# Patient Record
Sex: FEMALE | Race: BLACK OR AFRICAN AMERICAN | Employment: OTHER | URBAN - METROPOLITAN AREA
[De-identification: names, ages, dates, MRNs, and addresses within clinical notes are randomized per-mention and may not be internally consistent; named-entity substitution may affect disease eponyms.]

---

## 2019-03-21 ENCOUNTER — OFFICE VISIT (OUTPATIENT)
Dept: INTERNAL MEDICINE CLINIC | Age: 44
End: 2019-03-21

## 2019-03-21 VITALS
OXYGEN SATURATION: 99 % | BODY MASS INDEX: 27.05 KG/M2 | TEMPERATURE: 97.9 F | WEIGHT: 147 LBS | RESPIRATION RATE: 18 BRPM | HEIGHT: 62 IN | DIASTOLIC BLOOD PRESSURE: 108 MMHG | HEART RATE: 74 BPM | SYSTOLIC BLOOD PRESSURE: 154 MMHG

## 2019-03-21 DIAGNOSIS — E83.52 HIGH CALCIUM LEVELS: ICD-10-CM

## 2019-03-21 DIAGNOSIS — Z13.220 SCREENING FOR CHOLESTEROL LEVEL: ICD-10-CM

## 2019-03-21 DIAGNOSIS — Z90.81 HX OF SPLENECTOMY: Primary | ICD-10-CM

## 2019-03-21 DIAGNOSIS — M54.5 CHRONIC LOW BACK PAIN, UNSPECIFIED BACK PAIN LATERALITY, WITH SCIATICA PRESENCE UNSPECIFIED: ICD-10-CM

## 2019-03-21 DIAGNOSIS — M25.551 RIGHT HIP PAIN: ICD-10-CM

## 2019-03-21 DIAGNOSIS — M25.551 PAIN OF RIGHT HIP JOINT: ICD-10-CM

## 2019-03-21 DIAGNOSIS — I10 ESSENTIAL HYPERTENSION: ICD-10-CM

## 2019-03-21 DIAGNOSIS — Z11.4 SCREENING FOR HIV (HUMAN IMMUNODEFICIENCY VIRUS): ICD-10-CM

## 2019-03-21 DIAGNOSIS — Z13.1 SCREENING FOR DIABETES MELLITUS: ICD-10-CM

## 2019-03-21 DIAGNOSIS — M54.50 CHRONIC RIGHT-SIDED LOW BACK PAIN WITHOUT SCIATICA: ICD-10-CM

## 2019-03-21 DIAGNOSIS — G89.29 CHRONIC LOW BACK PAIN, UNSPECIFIED BACK PAIN LATERALITY, WITH SCIATICA PRESENCE UNSPECIFIED: ICD-10-CM

## 2019-03-21 DIAGNOSIS — G89.29 CHRONIC RIGHT-SIDED LOW BACK PAIN WITHOUT SCIATICA: ICD-10-CM

## 2019-03-21 DIAGNOSIS — M54.5 LOW BACK PAIN, UNSPECIFIED BACK PAIN LATERALITY, UNSPECIFIED CHRONICITY, WITH SCIATICA PRESENCE UNSPECIFIED: Primary | ICD-10-CM

## 2019-03-21 RX ORDER — LISINOPRIL 10 MG/1
10 TABLET ORAL DAILY
Qty: 30 TAB | Refills: 5 | Status: SHIPPED | OUTPATIENT
Start: 2019-03-21 | End: 2019-07-11 | Stop reason: SDUPTHER

## 2019-03-21 RX ORDER — LISINOPRIL 10 MG/1
TABLET ORAL DAILY
COMMUNITY
End: 2019-03-21 | Stop reason: SDUPTHER

## 2019-03-21 NOTE — PATIENT INSTRUCTIONS
Resume lisinopril and return for blood pressure check in two weeks We may need to increase your blood pressure medication Make appointment with Master Castañeda

## 2019-03-21 NOTE — PROGRESS NOTES
Reviewed record in preparation for visit and have obtained necessary documentation. Identified pt with two pt identifiers(name and ). [facility] Due Topic Date Due  
 DTaP/Tdap/Td  (1 - Tdap) 10/01/1997  Pap Test  10/01/1997  Flu Vaccine  2018 Ms. Sathish Colmenares has a reminder for a \"due or due soon\" health maintenance. I have asked that she discuss this further with her primary care provider for follow-up on this health maintenance. Coordination of Care Questionnaire: 
:  
 
1) Have you been to an emergency room, urgent care clinic since your last visit? no  
Hospitalized since your last visit? no          
 
2) Have you seen or consulted any other health care providers outside of [address] [place] [address] since your last visit? no  (Include any pap smears or colon screenings in this section.) 3) In the event something were to happen to you and you were unable to speak on your behalf, do you have an Advance Directive/ Living Will in place stating your wishes? NO Do you have an Advance Directive on file? no 
 
4) Are you interested in receiving information on Advance Directives? NO Patient is accompanied by self I have received verbal consent from Sarah Bonner to discuss any/all medical information while they are present in the room.
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
## 2019-03-22 LAB
BASOPHILS # BLD AUTO: 0 X10E3/UL (ref 0–0.2)
BASOPHILS NFR BLD AUTO: 0 %
BUN SERPL-MCNC: 16 MG/DL (ref 6–24)
BUN/CREAT SERPL: 14 (ref 9–23)
CALCIUM SERPL-MCNC: 10.5 MG/DL (ref 8.7–10.2)
CHLORIDE SERPL-SCNC: 102 MMOL/L (ref 96–106)
CHOLEST SERPL-MCNC: 240 MG/DL (ref 100–199)
CO2 SERPL-SCNC: 25 MMOL/L (ref 20–29)
CREAT SERPL-MCNC: 1.15 MG/DL (ref 0.57–1)
EOSINOPHIL # BLD AUTO: 0.1 X10E3/UL (ref 0–0.4)
EOSINOPHIL NFR BLD AUTO: 2 %
ERYTHROCYTE [DISTWIDTH] IN BLOOD BY AUTOMATED COUNT: 15.7 % (ref 12.3–15.4)
GLUCOSE SERPL-MCNC: 89 MG/DL (ref 65–99)
HBA1C MFR BLD: 5.9 % (ref 4.8–5.6)
HCT VFR BLD AUTO: 41.7 % (ref 34–46.6)
HDLC SERPL-MCNC: 72 MG/DL
HGB BLD-MCNC: 13.2 G/DL (ref 11.1–15.9)
HIV 1+2 AB+HIV1 P24 AG SERPL QL IA: NON REACTIVE
IMM GRANULOCYTES # BLD AUTO: 0.1 X10E3/UL (ref 0–0.1)
IMM GRANULOCYTES NFR BLD AUTO: 1 %
LDLC SERPL CALC-MCNC: 139 MG/DL (ref 0–99)
LYMPHOCYTES # BLD AUTO: 2.6 X10E3/UL (ref 0.7–3.1)
LYMPHOCYTES NFR BLD AUTO: 28 %
MCH RBC QN AUTO: 26.4 PG (ref 26.6–33)
MCHC RBC AUTO-ENTMCNC: 31.7 G/DL (ref 31.5–35.7)
MCV RBC AUTO: 83 FL (ref 79–97)
MONOCYTES # BLD AUTO: 0.7 X10E3/UL (ref 0.1–0.9)
MONOCYTES NFR BLD AUTO: 8 %
NEUTROPHILS # BLD AUTO: 5.6 X10E3/UL (ref 1.4–7)
NEUTROPHILS NFR BLD AUTO: 61 %
PLATELET # BLD AUTO: 406 X10E3/UL (ref 150–379)
POTASSIUM SERPL-SCNC: 4.7 MMOL/L (ref 3.5–5.2)
RBC # BLD AUTO: 5 X10E6/UL (ref 3.77–5.28)
SODIUM SERPL-SCNC: 142 MMOL/L (ref 134–144)
TRIGL SERPL-MCNC: 147 MG/DL (ref 0–149)
VLDLC SERPL CALC-MCNC: 29 MG/DL (ref 5–40)
WBC # BLD AUTO: 9.1 X10E3/UL (ref 3.4–10.8)

## 2019-03-24 ENCOUNTER — HOSPITAL ENCOUNTER (OUTPATIENT)
Dept: ULTRASOUND IMAGING | Age: 44
Discharge: HOME | End: 2019-03-24
Attending: INTERNAL MEDICINE
Payer: COMMERCIAL

## 2019-03-24 DIAGNOSIS — Z90.81 HX OF SPLENECTOMY: ICD-10-CM

## 2019-03-24 PROCEDURE — 76700 US EXAM ABDOM COMPLETE: CPT

## 2019-03-26 ENCOUNTER — TELEPHONE (OUTPATIENT)
Dept: INTERNAL MEDICINE CLINIC | Age: 44
End: 2019-03-26

## 2019-03-26 DIAGNOSIS — M54.50 CHRONIC BILATERAL LOW BACK PAIN WITHOUT SCIATICA: Primary | ICD-10-CM

## 2019-03-26 DIAGNOSIS — G89.29 CHRONIC BILATERAL LOW BACK PAIN WITHOUT SCIATICA: Primary | ICD-10-CM

## 2019-03-26 NOTE — TELEPHONE ENCOUNTER
Dr. Irma Davis is recommending Ms. Campbell to see Dr. Demetrio Evans / [facility]. Please fax referral to Ortho and Patient would like to receive a copy also, Please call patient when ready. Please leave a voicemail if patient doesn't answer.

## 2019-03-27 NOTE — PROGRESS NOTES
Calcium level is slightly elevated recommend that we repeat calcium. Patient should not take any calcium supplement. Cholesterol is mildly elevated - work on low fat diet, high in fiber ( such as oatmeal) Pre Diabetes: Recommend following a low sugar diet and not drinking soda /sweetened drinks

## 2019-03-27 NOTE — PROGRESS NOTES
You have a partial spleen so no need to do extra vaccinations  On imaging there is a renal cyst these are typically benign  The appearance of your kidneys show kidney disease which is most likely due to high blood pressure. Your kidney function looked ok on labs. It is important that you avoid NSAIDs ( ibuprofen, motrin)  It is important that you take your blood pressure medication  And we will discuss this further on the appointment in May.

## 2019-03-28 NOTE — PROGRESS NOTES
Spoke to pt. Reported Calcium level is slightly elevated recommend that we repeat calcium. Patient should not take any calcium supplement. Cholesterol is mildly elevated - work on low fat diet, high in fiber ( such as oatmeal) Pre Diabetes: Recommend following a low sugar diet and not drinking soda /sweetened drinks Calcium lab order in system, pt told to come in for lab work sometime next week, no appointment needed. No further questions at time of call.

## 2019-05-02 ENCOUNTER — HOSPITAL ENCOUNTER (OUTPATIENT)
Dept: PHYSICAL THERAPY | Age: 44
Discharge: HOME | End: 2019-05-02
Payer: COMMERCIAL

## 2019-05-02 PROCEDURE — 97110 THERAPEUTIC EXERCISES: CPT

## 2019-05-02 PROCEDURE — 97162 PT EVAL MOD COMPLEX 30 MIN: CPT

## 2019-05-02 NOTE — PROGRESS NOTES
PT INITIAL EVALUATION NOTE 2-15 Patient Name: Scot Ring Date:2019 : 1975 [x]  Patient  Verified Payor: [address] [place] [address] Road / Plan: [address] / Product Type: Managed Care Medicaid / In time: 8:09 AM  Out time: 9:06 AM 
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 She was born R sided hemihypertrophy and has had numerous surgeries on the R side. She does not use a heel lift; she may have done this as a child, but not in her adult years. She reports that since moving back from [place], the intensity and frequency have both decreased and it is not constant. She reports that her back pain is on the R side and feels muscular; she complains of a knot in the lower back. She reports that she has been experiencing L sided nerve pain/discomfort for the last 1-2 years. She will experience tingling in the posterior thigh that stops proximal to the knee when she is lying on her back with her trunk elevated (not all the time). She also experiences \"sensations\" on the L side that are variable and unpredictable. She denies any numbness in her LEs. She complains of weakness and increased fatigue in the R LE over the last few years; she denies any bucking in the R LE. She reports decreased R knee flexion. Aggravating factors include: walking > 2 blocks, standing > 5 minutes. Relieving factors include: rest, bending forward. She is independent with all ADLs, but has more pain and discomfort. She works as an editorial manicurist and a loan - she will go to [place] 2x a month and it will be difficult because she has to carry her supplies and walk more. She denies any problems sitting for long period of time. She lives in a 1-story home with 3 stairs to enter; she is independent with stair navigation. She is sleeping okay at night; she sleeps on her stomach. PMH:  3 surgeries in R hip, 2 surgeries in R knee, 5-6 surgeries on R foot/ankle, HTN (controlled), hx of bell's palsy. OBJECTIVE/EXAMINATION Posture: In standing, L hip higher, R hip dip to making up for R leg discrepancy. Other Observations:  N/A Gait and Functional Mobility:  Antalgic and ataxic, R uncompensated Trendelenburg gait Lumbar ROM: 
 Flexion: Mild Extension: Mild Side Bending: Right: NT   Left: NT 
 Rotation: Right: WFL    Left: Mild Balance Assessment: Moderate deficits in balance and neuromuscular control in single limb stance on R Squat Assessment: 
 Double Leg Unable to squat past 90 degrees knee flexion because of R knee ROM; forward trunk lean, genu valgus Single Leg NT Neurological: Sensations: Intact to light touch sensation L1-S2 bilaterally Flexibility: Moderate restrictions in hamstrings, hip internal and external rotators, and gastrocnemius/soleus complex bilaterally; Severe R hip flexor restrictions Right Knee:  AROM 0-90 Left  Knee:  AROM [facility] LOWER QUARTER   MUSCLE STRENGTH 
KEY       R  L 
0 - No Contraction  Hip flex  4  4+ 1 - Trace          er    4-  4 
2 - Poor          ir   4  4 
3 - Fair           abd  4-  4 
4 - Good          ext  4  4+ 5 - Normal   Knee flex  4  4+ Ext  4  4+ Ankle DF  NT  5 Gluteal activation: The Pt has improper gluteal activation with hip extension bilaterally Joint Mobility Assessment: Mild hypomobility of thoracic and lumbar spine Palpation: TTP along R lumbar paraspinals and lower thoracic paraspinals with increased turgor noted Special Tests:Varus: NT     SLR: (-) B Valgus: NT     Slump: NT 
  Zachery's: NT    Keaton: NT Anterior Drawer: NT    Obers: NT 
  Posterior Drawer: NT    Clonus: NT 
  Lachman's: NT 
 
 
13 min Therapeutic Exercise:  [x] See flow sheet :  
Rationale: increase ROM, increase strength, improve coordination, improve balance and increase proprioception to improve the patients ability to ambulate and perform ADLs with less pain or discomfort. With 
 [x] TE 
 [] TA 
 [] neuro [x] other: Patient Education: [x] Review HEP [] Progressed/Changed HEP based on:  
[] positioning   [] body mechanics   [] transfers   [] heat/ice application   
[x] other: Pt educated on diagnosis and prognosis with therapy Other Objective/Functional Measures: FOTO 62/100 Pain Level (0-10 scale) post treatment: 5/10 ASSESSMENT:  
  
[x]  See Plan of Care Racquel Daniel, PT 5/2/2019

## 2019-05-02 NOTE — PROGRESS NOTES
[facility] [address] [place] Phone: [phone] Fax: [phone] Plan of Care/Statement of Necessity for Physical Therapy Services  2-15 Patient name: Carolynn Benitez  : 1975  Provider#: [id number] Referral source: Jacob Del Valle MD     
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 
 
Start of Care: 20      Onset Date: Chronic The Plan of Care and following information is based on the information from the initial evaluation. Assessment/ key information: The Pt is a pleasant and motivated 44year old female who presents to therapy with R-sided lower back pain and occasional L-sided radiculopathy. The Pt has been diagnosis with hemihypertrophy with R sided LE shortening and a R club foot. She has had multiple surgeries on her R hip, knee, and ankle/foot since childhood. The Pt complains of significant, chronic R-sided LBP. Based on examination, the Pt presents with decreased hip strength and stability, decreased core strength and stability, restrictions in her hip musculature flexibility, decreased balance and neuromuscular control, gait impairments, and decreased activity tolerance and endurance. The patient would benefit from skilled physical therapy to help improve the above listed impairments to allow the patient to safely return to their prior level of function with less overall pain or risk of further injury. The patient has a fair prognosis with skilled physical therapy. Evaluation Complexity History HIGH Complexity :3+ comorbidities / personal factors will impact the outcome/ POC ; Examination HIGH Complexity : 4+ Standardized tests and measures addressing body structure, function, activity limitation and / or participation in recreation  ;Presentation MEDIUM Complexity : Evolving with changing characteristics  ; Clinical Decision Making MEDIUM Complexity : FOTO score of 26-74 Overall Complexity Rating: MEDIUM Problem List: pain affecting function, decrease ROM, decrease strength, impaired gait/ balance, decrease ADL/ functional abilitiies, decrease activity tolerance, decrease flexibility/ joint mobility and decrease transfer abilities Treatment Plan may include any combination of the following: Therapeutic exercise, Therapeutic activities, Neuromuscular re-education, Physical agent/modality, Gait/balance training, Manual therapy, Patient education, Self Care training, Functional mobility training, Home safety training and Stair training Patient / Family readiness to learn indicated by: asking questions and interest 
Persons(s) to be included in education: patient (P) Barriers to Learning/Limitations: None Patient Goal (s): learn to live with the pain, build right side muscle Patient Self Reported Health Status: fair Rehabilitation Potential: fair Short Term Goals: To be accomplished in 6 treatments: 
1. The Pt will be independent and compliant with their HEP. 2. The Pt will report a 50% reduction in their pain with ADLs. Long Term Goals: To be accomplished in 12 treatments: 
1. The Pt will score the MCII on her FOTO survey demonstrating improved overall function (62 to 67 points). 2. The Pt will be able to stand >/= 10 minutes with 0-4/10 pain to allow the Pt to be able to perform standing ADLs with less pain or discomfort.  
3. The Pt will be able to walk >/= 4 city blocks with 0-4/10 pain to allow the Pt to be able to walk to work in [facility] with less pain or discomfort. Frequency / Duration: Patient to be seen 1-2 times per week for 12 treatments. Patient/ Caregiver education and instruction: self care, activity modification and exercises 
 
[x]  Plan of care has been reviewed with SELWYN No, PT 5/2/2019  
 
________________________________________________________________________ I certify that the above Therapy Services are being furnished while the patient is under my care. I agree with the treatment plan and certify that this therapy is necessary. [de-identified] Signature:____________________  Date:____________Time: _________

## 2019-05-07 ENCOUNTER — HOSPITAL ENCOUNTER (OUTPATIENT)
Dept: PHYSICAL THERAPY | Age: 44
Discharge: HOME | End: 2019-05-07
Payer: COMMERCIAL

## 2019-05-07 PROCEDURE — 97110 THERAPEUTIC EXERCISES: CPT

## 2019-05-07 NOTE — PROGRESS NOTES
PT DAILY TREATMENT NOTE 2-15 Patient Name: Karuna Main Date:2019 : 1975 [x]  Patient  Verified Payor: [address] [place] [address] Road / Plan: [address] / Product Type: Managed Care Medicaid / In time: 7:34 AM  Out time: 8:27 AM 
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 She is not sure that she is doing some of the exercises correctly. OBJECTIVE 53 min Therapeutic Exercise:  [x] See flow sheet :  
Rationale: increase ROM, increase strength, improve coordination, improve balance and increase proprioception to improve the patients ability to ambulate and perform ADLs with less pain or discomfort. With 
 [x] TE 
 [] TA 
 [] neuro 
 [] other: Patient Education: [x] Review HEP [] Progressed/Changed HEP based on:  
[] positioning   [] body mechanics   [] transfers   [] heat/ice application   
[] other:   
 
Other Objective/Functional Measures: None noted Pain Level (0-10 scale) post treatment: 3/10 ASSESSMENT/Changes in Function: The Pt did require mild verbal cues to correct her form and technique of her HEP, but made the corrections easily. She tolerated the additions to her therapy program well with appropriate fatigue noted at the end of the session.  
Patient will continue to benefit from skilled PT services to modify and progress therapeutic interventions, address functional mobility deficits, address ROM deficits, address strength deficits, analyze and address soft tissue restrictions, analyze and cue movement patterns, analyze and modify body mechanics/ergonomics, assess and modify postural abnormalities and instruct in home and community integration to attain remaining goals. []  See Plan of Care 
[]  See progress note/recertification 
[]  See Discharge Summary Progress towards goals / Updated goals: 
Short Term Goals: To be accomplished in 6 treatments: 
1. The Pt will be independent and compliant with their HEP- progressing 2. The Pt will report a 50% reduction in their pain with ADLs- progressing Long Term Goals: To be accomplished in 12 treatments: 
1. The Pt will score the MCII on her FOTO survey demonstrating improved overall function (62 to 67 points)- progressing 2. The Pt will be able to stand >/= 10 minutes with 0-4/10 pain to allow the Pt to be able to perform standing ADLs with less pain or discomfort- progressing 3. The Pt will be able to walk >/= 4 city blocks with 0-4/10 pain to allow the Pt to be able to walk to work in [employer] with less pain or discomfort- progressing 
  
PLAN [x]  Upgrade activities as tolerated     [x]  Continue plan of care [x]  Update interventions per flow sheet      
[]  Discharge due to:_ 
[]  Other:_ Rhett Travis, PT 5/7/2019

## 2019-05-09 ENCOUNTER — APPOINTMENT (OUTPATIENT)
Dept: PHYSICAL THERAPY | Age: 44
End: 2019-05-09
Payer: COMMERCIAL

## 2019-05-14 ENCOUNTER — HOSPITAL ENCOUNTER (OUTPATIENT)
Dept: PHYSICAL THERAPY | Age: 44
Discharge: HOME | End: 2019-05-14
Payer: COMMERCIAL

## 2019-05-14 PROCEDURE — 97110 THERAPEUTIC EXERCISES: CPT

## 2019-05-17 ENCOUNTER — HOSPITAL ENCOUNTER (OUTPATIENT)
Dept: PHYSICAL THERAPY | Age: 44
Discharge: HOME | End: 2019-05-17
Payer: COMMERCIAL

## 2019-05-17 PROCEDURE — 97110 THERAPEUTIC EXERCISES: CPT

## 2019-05-17 NOTE — PROGRESS NOTES
PT DAILY TREATMENT NOTE 2-15 Patient Name: Renetta Mullins Date:2019 : 1975 [x]  Patient  Verified Payor: [address] [place] [address] Road / Plan: [address] / Product Type: Managed Care Medicaid / In time:1200  Out time:1251 Total Treatment Time (min): 51 Visit #:  4 Treatment Area: Spondylosis without myelopathy or radiculopathy, lumbosacral region [M47.817] Unequal limb length (acquired), unspecified site [M21.70] Other secondary scoliosis, site unspecified [M41.50] SUBJECTIVE Pain Level (0-10 scale): 0/10 Any medication changes, allergies to medications, adverse drug reactions, diagnosis change, or new procedure performed?: [x] No    [] Yes (see summary sheet for update) Subjective functional status/changes:   [] No changes reported Patient reports no changes since last visit. OBJECTIVE 51 min Therapeutic Exercise:  [x] See flow sheet :  
Rationale: increase ROM and increase strength to improve the patients ability to perform ADLs and reduce pain levels With 
 [] TE 
 [] TA 
 [] neuro 
 [] other: Patient Education: [x] Review HEP [] Progressed/Changed HEP based on:  
[] positioning   [] body mechanics   [] transfers   [] heat/ice application   
[] other:   
 
Other Objective/Functional Measures: none noted Pain Level (0-10 scale) post treatment: 3/10 ASSESSMENT/Changes in Function:  
Patient will require VCs in order to properly perform therex. Pain primarily when standing on the RLE while performing hip abduction. Continues to have a shortened stride while ambulating on the LLE. Will continue to progress as tolerated.  
Patient will continue to benefit from skilled PT services to modify and progress therapeutic interventions, address functional mobility deficits, address ROM deficits, address strength deficits, analyze and address soft tissue restrictions, analyze and cue movement patterns, analyze and modify body mechanics/ergonomics and assess and modify postural abnormalities to attain remaining goals. [x]  See Plan of Care 
[]  See progress note/recertification 
[]  See Discharge Summary Progress towards goals / Updated goals: 
Patient is progressing towards goals, will continue to strengthen the hip stabilizers in order to reduce pain levels. PLAN [x]  Upgrade activities as tolerated     [x]  Continue plan of care [x]  Update interventions per flow sheet      
[]  Discharge due to:_ 
[]  Other:_ Socrates Solomon 5/17/2019

## 2019-05-21 ENCOUNTER — HOSPITAL ENCOUNTER (OUTPATIENT)
Dept: PHYSICAL THERAPY | Age: 44
Discharge: HOME | End: 2019-05-21
Payer: COMMERCIAL

## 2019-05-21 PROCEDURE — 97110 THERAPEUTIC EXERCISES: CPT

## 2019-05-21 NOTE — PROGRESS NOTES
PT DAILY TREATMENT NOTE 2-15    Patient Name: Lala Oropeza  Date:2019  : 1975  [x]  Patient  Verified  Payor: [address] / Plan: [address] / Product Type: Managed Care Medicaid /    In time:600  Out time:701  Total Treatment Time (min): 61  Visit #:  5    Treatment Area: Spondylosis without myelopathy or radiculopathy, lumbosacral region [M47.817]  Unequal limb length (acquired), unspecified site [M21.70]  Other secondary scoliosis, site unspecified [M41.50]    SUBJECTIVE  Pain Level (0-10 scale): 0/10  Any medication changes, allergies to medications, adverse drug reactions, diagnosis change, or new procedure performed?: [x] No    [] Yes (see summary sheet for update)  Subjective functional status/changes:   [] No changes reported  Patient reports she was sore after last visit. OBJECTIVE    61 min Therapeutic Exercise:  [x] See flow sheet :   Rationale: increase ROM and increase strength to improve the patients ability to perform ADLs and reduce pain levels           With   [] TE   [] TA   [] neuro   [] other: Patient Education: [x] Review HEP    [] Progressed/Changed HEP based on:   [] positioning   [] body mechanics   [] transfers   [] heat/ice application    [] other:      Other Objective/Functional Measures: none noted     Pain Level (0-10 scale) post treatment: 0/10    ASSESSMENT/Changes in Function:   Patient is slightly out of alignment, was corrected easily. Improved pelvic stability demonstrated during basic multifidus. Attempted heel lift, did not have one that properly fit. Patient will continue to benefit from skilled PT services to modify and progress therapeutic interventions, address functional mobility deficits, address ROM deficits, address strength deficits, analyze and address soft tissue restrictions, analyze and cue movement patterns and analyze and modify body mechanics/ergonomics to attain remaining goals.      [x]  See Plan of Care  []  See progress note/recertification  []  See Discharge Summary         Progress towards goals / Updated goals:  Patient is progressing towards goals, will continue to strengthen the hip stabilizers in order to reduce pain levels.     PLAN  [x]  Upgrade activities as tolerated     [x]  Continue plan of care  [x]  Update interventions per flow sheet       []  Discharge due to:_  []  Other:_      Wendy Gandhi 5/21/2019

## 2019-05-24 ENCOUNTER — HOSPITAL ENCOUNTER (OUTPATIENT)
Dept: PHYSICAL THERAPY | Age: 44
Discharge: HOME | End: 2019-05-24
Payer: COMMERCIAL

## 2019-05-24 PROCEDURE — 97110 THERAPEUTIC EXERCISES: CPT

## 2019-05-24 NOTE — PROGRESS NOTES
PT DAILY TREATMENT NOTE 2-15    Patient Name: Moses Potts  Date:2019  : 1975  [x]  Patient  Verified  Payor: [address] [place] [address] Road / Plan: [address] / Product Type: Managed Care Medicaid /    In time: 7:02 AM  Out time: 8:03 AM  Total Treatment Time (min): 61 minutes  Visit #:  6    Treatment Area: Spondylosis without myelopathy or radiculopathy, lumbosacral region [M47.817]  Unequal limb length (acquired), unspecified site [M21.70]  Other secondary scoliosis, site unspecified [M41.50]    SUBJECTIVE  Pain Level (0-10 scale): 0/10  Any medication changes, allergies to medications, adverse drug reactions, diagnosis change, or new procedure performed?: [x] No    [] Yes (see summary sheet for update)  Subjective functional status/changes:   [] No changes reported  The Pt reports that she is not having any pain this morning because she just woke up. She reports that she continues to have pain in the R lower back and does not feel like it symptoms have changed significantly since starting therapy. OBJECTIVE    61 min Therapeutic Exercise:  [x] See flow sheet :   Rationale: increase ROM, increase strength, improve coordination, improve balance and increase proprioception to improve the patients ability to ambulate and perform ADLs with less pain or discomfort. With   [x] TE   [] TA   [] neuro   [] other: Patient Education: [x] Review HEP    [] Progressed/Changed HEP based on:   [] positioning   [] body mechanics   [] transfers   [] heat/ice application    [] other:      Other Objective/Functional Measures: None noted     Pain Level (0-10 scale) post treatment: 10 (fatigue)    ASSESSMENT/Changes in Function:   Today, spent a significant amount of time focusing on proper form and technique, but the Pt is highly likely to compensate due to her significant hip weakness. She was able to make these corrections, but it was difficult for her.   Once the corrections were made, she was able to feel fatigue in her glutes. Will continue to progress as tolerated during next PT session. Patient will continue to benefit from skilled PT services to modify and progress therapeutic interventions, address functional mobility deficits, address ROM deficits, address strength deficits, analyze and address soft tissue restrictions, analyze and cue movement patterns, analyze and modify body mechanics/ergonomics, assess and modify postural abnormalities and instruct in home and community integration to attain remaining goals. []  See Plan of Care  []  See progress note/recertification  []  See Discharge Summary         Progress towards goals / Updated goals:  Short Term Goals: To be accomplished in 6 treatments:  1. The Pt will be independent and compliant with their HEP- progressing  2. The Pt will report a 50% reduction in their pain with ADLs- progressing  Long Term Goals: To be accomplished in 12 treatments:  1. The Pt will score the MCII on her FOTO survey demonstrating improved overall function (62 to 67 points)- progressing  2. The Pt will be able to stand >/= 10 minutes with 0-4/10 pain to allow the Pt to be able to perform standing ADLs with less pain or discomfort- progressing  3.  The Pt will be able to walk >/= 4 city blocks with 0-4/10 pain to allow the Pt to be able to walk to work in [employer] with less pain or discomfort- progressing     PLAN  [x]  Upgrade activities as tolerated     [x]  Continue plan of care  [x]  Update interventions per flow sheet       []  Discharge due to:_  []  Other:_      Racquel Daniel, PT 5/24/2019

## 2019-05-29 ENCOUNTER — HOSPITAL ENCOUNTER (OUTPATIENT)
Dept: PHYSICAL THERAPY | Age: 44
End: 2019-05-29
Payer: COMMERCIAL

## 2019-05-31 ENCOUNTER — HOSPITAL ENCOUNTER (OUTPATIENT)
Dept: PHYSICAL THERAPY | Age: 44
Discharge: HOME | End: 2019-05-31
Payer: COMMERCIAL

## 2019-05-31 ENCOUNTER — APPOINTMENT (OUTPATIENT)
Dept: PHYSICAL THERAPY | Age: 44
End: 2019-05-31
Payer: COMMERCIAL

## 2019-05-31 PROCEDURE — 97110 THERAPEUTIC EXERCISES: CPT

## 2019-05-31 NOTE — PROGRESS NOTES
PT DAILY TREATMENT NOTE 2-15    Patient Name: Sulma Piper  Date:2019  : 1975  [x]  Patient  Verified  Payor: [address] [place] [address] Road / Plan: [address] / Product Type: Managed Care Medicaid /    In time: 10:02 AM  Out time: 10:52 AM  Total Treatment Time (min): 50 minutes  Visit #:  7    Treatment Area: Spondylosis without myelopathy or radiculopathy, lumbosacral region [M47.817]  Unequal limb length (acquired), unspecified site [M21.70]  Other secondary scoliosis, site unspecified [M41.50]    SUBJECTIVE  Pain Level (0-10 scale): 5/10  Any medication changes, allergies to medications, adverse drug reactions, diagnosis change, or new procedure performed?: [x] No    [] Yes (see summary sheet for update)  Subjective functional status/changes:   [] No changes reported  The pt reports that she recently started a part time job and has to walk 3 blocks uphill and it is taxing and puts more pressure on her back. OBJECTIVE    50 min Therapeutic Exercise:  [x] See flow sheet :   Rationale: increase ROM, increase strength, improve coordination, improve balance and increase proprioception to improve the patients ability to ambulate and perform ADLs with less pain or discomfort. With   [x] TE   [] TA   [] neuro   [] other: Patient Education: [x] Review HEP    [] Progressed/Changed HEP based on:   [] positioning   [] body mechanics   [] transfers   [] heat/ice application    [] other:      Other Objective/Functional Measures: None noted     Pain Level (0-10 scale) post treatment: 5/10    ASSESSMENT/Changes in Function:   The Pt was able to tolerate her therapy program well and there was no increased pain. She reported improved gluteal activation with her exercises today. Next session, will evaluate the Pt for functional dry needling to help improve LBP.   Patient will continue to benefit from skilled PT services to modify and progress therapeutic interventions, address functional mobility deficits, address ROM deficits, address strength deficits, analyze and address soft tissue restrictions, analyze and cue movement patterns, analyze and modify body mechanics/ergonomics, assess and modify postural abnormalities and instruct in home and community integration to attain remaining goals. []  See Plan of Care  []  See progress note/recertification  []  See Discharge Summary         Progress towards goals / Updated goals:  Short Term Goals: To be accomplished in 6 treatments:  1. The Pt will be independent and compliant with their HEP- progressing  2. The Pt will report a 50% reduction in their pain with ADLs- progressing  Long Term Goals: To be accomplished in 12 treatments:  1. The Pt will score the MCII on her FOTO survey demonstrating improved overall function (62 to 67 points)- progressing  2. The Pt will be able to stand >/= 10 minutes with 0-4/10 pain to allow the Pt to be able to perform standing ADLs with less pain or discomfort- progressing  3.  The Pt will be able to walk >/= 4 city blocks with 0-4/10 pain to allow the Pt to be able to walk to work in [employer] with less pain or discomfort- progressing    PLAN  [x]  Upgrade activities as tolerated     [x]  Continue plan of care  [x]  Update interventions per flow sheet       []  Discharge due to:_  []  Other:_      Avery Mott, PT 5/31/2019

## 2019-06-07 ENCOUNTER — HOSPITAL ENCOUNTER (OUTPATIENT)
Dept: PHYSICAL THERAPY | Age: 44
Discharge: HOME | End: 2019-06-07
Payer: COMMERCIAL

## 2019-06-07 PROCEDURE — 97032 APPL MODALITY 1+ESTIM EA 15: CPT

## 2019-06-07 PROCEDURE — 97110 THERAPEUTIC EXERCISES: CPT

## 2019-06-07 NOTE — PROGRESS NOTES
[facility]  [address] ([place] [place]), [address]  Phone: [phone] Fax: [phone]    Progress Note    Name: Sherry Drummond   : 1975   MD: Lesly Pedro MD       Treatment Diagnosis: Spondylosis without myelopathy or radiculopathy, lumbosacral region [M47.817]  Unequal limb length (acquired), unspecified site [M21.70]  Other secondary scoliosis, site unspecified [M41.50]  Start of Care: 19    Visits from Start of Care: 8  Missed Visits: 0    Summary of Care: The Pt has been seen for 8 outpatient physical therapy sessions with R sided lower back pain. The Pt's therapy program has focused on improving her hip strength and stability, improving her core strength and stability, improving her lumbar spinal stability, improving her balance and neuromuscular control, reducing the turgor in the R lumbar erector spinae, and improving her activity tolerance and endurance via therapeutic exercises, manual therapy techniques, pain relieving modalities, and functional dry needling. The Pt just started functional dry needling during today session. Prior to this, the Pt reported no significant pain or symptom relief, but did feel improved gluteal strength and activation. With the addition of functional dry needling, the Pt reported feeling significantly looser and was able to perform all of her exercises without increased tightening in the R lower back. Recommend continued PT for an additional 5 sessions to help progress the functional dry needling to allow the Pt to safely return to her PLOF with less pain or risk of further injury. Thank you for this referral.    Progress towards goals / Updated goals:  Short Term Goals: To be accomplished in 6 treatments:  1. The Pt will be independent and compliant with their HEP- met  2.  The Pt will report a 50% reduction in their pain with ADLs- progressing  Long Term Goals: To be accomplished in 12 treatments:  1. The Pt will score the MCII on her FOTO survey demonstrating improved overall function (62 to 67 points)- progressing  2. The Pt will be able to stand >/= 10 minutes with 0-4/10 pain to allow the Pt to be able to perform standing ADLs with less pain or discomfort- progressing  3. The Pt will be able to walk >/= 4 city blocks with 0-4/10 pain to allow the Pt to be able to walk to work in [facility] [facility] with less pain or discomfort- progressing    Holland Booker, PT 6/7/2019     ________________________________________________________________________  NOTE TO PHYSICIAN:  Please complete the following and fax to: [facility]: [phone]  . Retain this original for your records. If you are unable to process this request in 24 hours, please contact our office.        ____ I have read the above report and request that my patient continue therapy with the following changes/special instructions:  ____ I have read the above report and request that my patient be discharged from therapy    Physician's Signature:_________________ Date:___________Time:__________

## 2019-06-07 NOTE — PROGRESS NOTES
PT DAILY TREATMENT NOTE - [facility] 2-15    Patient Name: Moses Potts  Date:2019  : 1975  [x]  Patient  Verified  Payor: [address] [place] [address] Road / Plan: [address] / Product Type: Managed Care Medicaid /    In time: 12:05 PM  Out time: 1:00 PM  Total Treatment Time (min): 55 minutes  Visit #:  8    Treatment Area: Spondylosis without myelopathy or radiculopathy, lumbosacral region [M47.817]  Unequal limb length (acquired), unspecified site [M21.70]  Other secondary scoliosis, site unspecified [M41.50]    SUBJECTIVE  Pain Level (0-10 scale): 0/10  Any medication changes, allergies to medications, adverse drug reactions, diagnosis change, or new procedure performed?: [x] No    [] Yes (see summary sheet for update)  Subjective functional status/changes:   [] No changes reported  The Pt reports that she had a follow up with Dr. Merline Mallet this morning who recommended to try dry needling. If the dry needling does not work, he recommended ESIs or a botox injection in the \"knot\". She reports that overall, she has not had any significant changes in her pain or symptoms. She feels like she has better glut activation and control, but no other major changes. She denies any contraindications or precautions to functional dry needling. OBJECTIVE  *Consent obtained to dry needle B L5 and S1 multfidi and R lumbar erector spinae  Modality rationale: decrease pain, increase tissue extensibility and increase muscle contraction/control to improve the patients ability to perform ADLs with less pain or discomfort.    Min Type Additional Details      10 [x] Estim: [x]Att   []Unatt    []TENS instruct                  []IFC  []Premod   []NMES                     [x]Other: Direct current []w/US   []w/ice   []w/heat  Position: Prone  Location: B L5 and S1 multfidi and R lumbar erector spinae       []  Traction: [] Cervical       []Lumbar                       [] Prone          []Supine []Intermittent   []Continuous Lbs:  [] before manual  [] after manual  []w/heat    []  Ultrasound: []Continuous   [] Pulsed                       at: []1MHz   []3MHz Location:  W/cm2:    [] Paraffin         Location:   []w/heat    []  Ice     []  Heat  []  Ice massage Position:  Location:    []  Laser  []  Other: Position:  Location:      []  Vasopneumatic Device Pressure:       [] lo [] med [] hi   Temperature:      [x] Skin assessment post-treatment:  [x]intact []redness- no adverse reaction    []redness  adverse reaction:     40 min Therapeutic Exercise:  [x] See flow sheet :   Rationale: increase ROM, increase strength, improve coordination, improve balance and increase proprioception to improve the patients ability to perform ADLs with less pain or discomfort. 5 min Manual Therapy: Dry needling to B L5 and S1 multfidi and R lumbar erector spinae    Rationale: decrease pain, increase ROM, increase tissue extensibility, decrease trigger points and increase postural awareness to improve the patients ability to perform ADLs with less pain or discomfort.     With   [x] TE   [] TA   [] neuro   [x] other: Patient Education: [x] Review HEP    [] Progressed/Changed HEP based on:   [] positioning   [] body mechanics   [] transfers   [] heat/ice application    [x] other: Pt educated on potential adverse effects and proper after care following dry needling      Other Objective/Functional Measures:  FOTO 52/100 (62/100 at initial evaluation)  Before Dry Needling:  L5 myotome: R- 3+/5; L- 4/5  S1 myotome: R- 4/5; L4/5    After Dry Needling:   L5 myotome: R- 4-/5; L- 4+/5  S1 myotome: R- 4+/5; L4+/5    Pain Level (0-10 scale) post treatment: 1/10    ASSESSMENT/Changes in Function:     Patient will continue to benefit from skilled PT services to modify and progress therapeutic interventions, address functional mobility deficits, address ROM deficits, address strength deficits, analyze and address soft tissue restrictions, analyze and cue movement patterns, analyze and modify body mechanics/ergonomics, assess and modify postural abnormalities and instruct in home and community integration to attain remaining goals. []  See Plan of Care  [x]  See progress note/recertification  []  See Discharge Summary         Progress towards goals / Updated goals:  Short Term Goals: To be accomplished in 6 treatments:  1. The Pt will be independent and compliant with their HEP- met  2. The Pt will report a 50% reduction in their pain with ADLs- progressing  Long Term Goals: To be accomplished in 12 treatments:  1. The Pt will score the MCII on her FOTO survey demonstrating improved overall function (62 to 67 points)- progressing  2. The Pt will be able to stand >/= 10 minutes with 0-4/10 pain to allow the Pt to be able to perform standing ADLs with less pain or discomfort- progressing  3.  The Pt will be able to walk >/= 4 city blocks with 0-4/10 pain to allow the Pt to be able to walk to work in [employer] with less pain or discomfort- progressing    PLAN  [x]  Upgrade activities as tolerated     [x]  Continue plan of care  [x]  Update interventions per flow sheet       []  Discharge due to:_  []  Other:_      Niko Persaud, PT 6/7/2019

## 2019-06-14 ENCOUNTER — HOSPITAL ENCOUNTER (OUTPATIENT)
Dept: PHYSICAL THERAPY | Age: 44
Discharge: HOME | End: 2019-06-14
Payer: COMMERCIAL

## 2019-06-14 PROCEDURE — 97110 THERAPEUTIC EXERCISES: CPT

## 2019-06-14 PROCEDURE — 97032 APPL MODALITY 1+ESTIM EA 15: CPT

## 2019-06-14 NOTE — PROGRESS NOTES
PT DAILY TREATMENT NOTE 2-15    Patient Name: Sulma Piper  Date:2019  : 1975  [x]  Patient  Verified  Payor: [address] [place] [address] Road / Plan: [address] / Product Type: Managed Care Medicaid /    In time: 12:00 PM  Out time: 1:00 PM  Total Treatment Time (min): 60 minutes  Visit #:  9    Treatment Area: Spondylosis without myelopathy or radiculopathy, lumbosacral region [M47.817]  Unequal limb length (acquired), unspecified site [M21.70]  Other secondary scoliosis, site unspecified [M41.50]    SUBJECTIVE  Pain Level (0-10 scale): 0/10  Any medication changes, allergies to medications, adverse drug reactions, diagnosis change, or new procedure performed?: [x] No    [] Yes (see summary sheet for update)  Subjective functional status/changes:   [] No changes reported  The Pt reports that her back has feel better since last session and not as tight. She was able to walk up the hill to work without increased LBP. OBJECTIVE  *Consent obtained to dry needle B L5 and S1 multifidi and R lumbar erector spinae  Modality rationale: decrease pain, increase tissue extensibility and increase muscle contraction/control to improve the patients ability to perform ADLs with less pain or discomfort.    Min Type Additional Details      10 [x] Estim: [x]Att   []Unatt    []TENS instruct                  []IFC  []Premod   []NMES                     [x]Other: Direct current []w/US   []w/ice   []w/heat  Position: Prone  Location: B L5 and S1 multifidi and R lumbar erector spinae       []  Traction: [] Cervical       []Lumbar                       [] Prone          []Supine                       []Intermittent   []Continuous Lbs:  [] before manual  [] after manual  []w/heat    []  Ultrasound: []Continuous   [] Pulsed                       at: []1MHz   []3MHz Location:  W/cm2:    [] Paraffin         Location:   []w/heat    []  Ice     []  Heat  []  Ice massage Position:  Location:    []  Laser  [] Other: Position:  Location:      []  Vasopneumatic Device Pressure:       [] lo [] med [] hi   Temperature:      [x] Skin assessment post-treatment:  [x]intact []redness- no adverse reaction    []redness  adverse reaction:         45 min Therapeutic Exercise:  [x] See flow sheet :   Rationale: increase ROM, increase strength, improve coordination, improve balance and increase proprioception to improve the patients ability to perform ADLs with less pain or discomfort. 5 min Manual Therapy:  Dry needling to B L5 and S1 multifidi and R lumbar erector spinae   Rationale: decrease pain, increase ROM, increase tissue extensibility, decrease trigger points and increase postural awareness  to improve the patients ability to perform ADLs with less pain or discomfort. With   [x] TE   [] TA   [] neuro   [x] other: Patient Education: [x] Review HEP    [] Progressed/Changed HEP based on:   [] positioning   [] body mechanics   [] transfers   [] heat/ice application    [x] other: Pt educated on potential adverse effects and proper after care following dry needling     Other Objective/Functional Measures:   Before Dry Needling:  L5 myotome testing: R- 4/5, L- 4+/5  S2 myotome testing: R- 4+/5, L- 4+/5    After Dry Needling:   L5 myotome testing: R- 4+/5, L- 5/5  S2 myotome testing: R- 5/5, L- 5/5    Pain Level (0-10 scale) post treatment: 0/10    ASSESSMENT/Changes in Function:   The Pt had improved motor recruitment between sessions and after the second round of functional dry needling her glutes were activating more significantly. She tolerated her exercises well and is able to notice improve gluteal activation during the exercises.   Patient will continue to benefit from skilled PT services to modify and progress therapeutic interventions, address functional mobility deficits, address ROM deficits, address strength deficits, analyze and address soft tissue restrictions, analyze and cue movement patterns, analyze and modify body mechanics/ergonomics, assess and modify postural abnormalities and instruct in home and community integration to attain remaining goals. []  See Plan of Care  []  See progress note/recertification  []  See Discharge Summary         Progress towards goals / Updated goals:  Short Term Goals: To be accomplished in 6 treatments:  1. The Pt will be independent and compliant with their HEP- met  2. The Pt will report a 50% reduction in their pain with ADLs- progressing  Long Term Goals: To be accomplished in 12 treatments:  1. The Pt will score the MCII on her FOTO survey demonstrating improved overall function (62 to 67 points)- progressing  2. The Pt will be able to stand >/= 10 minutes with 0-4/10 pain to allow the Pt to be able to perform standing ADLs with less pain or discomfort- progressing  3.  The Pt will be able to walk >/= 4 city blocks with 0-4/10 pain to allow the Pt to be able to walk to work in [employer] with less pain or discomfort- progressing     PLAN  [x]  Upgrade activities as tolerated     [x]  Continue plan of care  [x]  Update interventions per flow sheet       []  Discharge due to:_  []  Other:_      Berna Macario, PT 6/14/2019

## 2019-06-20 ENCOUNTER — HOSPITAL ENCOUNTER (OUTPATIENT)
Dept: PHYSICAL THERAPY | Age: 44
Discharge: HOME | End: 2019-06-20
Payer: COMMERCIAL

## 2019-06-20 PROCEDURE — 97110 THERAPEUTIC EXERCISES: CPT

## 2019-06-20 PROCEDURE — 97032 APPL MODALITY 1+ESTIM EA 15: CPT

## 2019-06-20 NOTE — PROGRESS NOTES
PT DAILY TREATMENT NOTE 2-15    Patient Name: Author Bird  Date:2019  : 1975  [x]  Patient  Verified  Payor: [address] [place] [address] Road / Plan: [address] / Product Type: Managed Care Medicaid /    In time: 7:00 AM  Out time: 7:52 AM  Total Treatment Time (min): 52 minutes  Visit #:  10    Treatment Area: Spondylosis without myelopathy or radiculopathy, lumbosacral region [M47.817]  Unequal limb length (acquired), unspecified site [M21.70]  Other secondary scoliosis, site unspecified [M41.50]    SUBJECTIVE  Pain Level (0-10 scale): 0/10  Any medication changes, allergies to medications, adverse drug reactions, diagnosis change, or new procedure performed?: [x] No    [] Yes (see summary sheet for update)  Subjective functional status/changes:   [] No changes reported  The Pt reports that her pain has continued to hold steady between sessions. She is able to walk up the hill to work without increased R lower back pain and tightness. She reports that she feels like the R lower back is no longer as \"tightly wound \". OBJECTIVE  *Consent obtained to dry needle B L5 and S1 multifidi and R lumbar erector spinae   Modality rationale: decrease pain, increase tissue extensibility and increase muscle contraction/control to improve the patients ability to perform ADLs with less pain or discomfort.    Min Type Additional Details      10 [x] Estim: []Att   [x]Unatt    []TENS instruct                  []IFC  []Premod   []NMES                     [x]Other: Direct current  []w/US   []w/ice   []w/heat  Position: Prone  Location: B L5 and S1 multifidi and R lumbar erector spinae        []  Traction: [] Cervical       []Lumbar                       [] Prone          []Supine                       []Intermittent   []Continuous Lbs:  [] before manual  [] after manual  []w/heat    []  Ultrasound: []Continuous   [] Pulsed                       at: []1MHz   []3MHz Location:  W/cm2:    [] Paraffin Location:   []w/heat    []  Ice     []  Heat  []  Ice massage Position:  Location:    []  Laser  []  Other: Position:  Location:      []  Vasopneumatic Device Pressure:       [] lo [] med [] hi   Temperature:      [x] Skin assessment post-treatment:  [x]intact []redness- no adverse reaction    []redness  adverse reaction:         37 min Therapeutic Exercise:  [x] See flow sheet :   Rationale: increase ROM, increase strength, improve coordination, improve balance and increase proprioception to improve the patients ability to perform ADLs with less pain or discomfort. 5 min Manual Therapy:  Dry needling to B L5 and S1 multifidi and R lumbar erector spinae    Rationale: decrease pain, increase ROM, increase tissue extensibility, decrease trigger points and increase postural awareness  to improve the patients ability to perform ADLs with less pain or discomfort. With   [x] TE   [] TA   [] neuro   [x] other: Patient Education: [x] Review HEP    [] Progressed/Changed HEP based on:   [] positioning   [] body mechanics   [] transfers   [] heat/ice application    [x] other: Pt educated on potential adverse effects and proper after care following dry needling     Other Objective/Functional Measures:   Before Dry Needling:  Hip abduction: R- 4/5, L- 4+/5  Hip extension:R- 4/5, L- 4+/5    After Dry Needling:   Hip abduction: R- 4+/5, L- 5/5  Hip extension:R- 4+/5, L- 5/5    Pain Level (0-10 scale) post treatment: 0/10    ASSESSMENT/Changes in Function:   The Pt tolerated the dry needling well and was able to do her exercises after without pain or discomfort. She was unable to do all of her exercises because she needed to leave to get to work.   Patient will continue to benefit from skilled PT services to modify and progress therapeutic interventions, address functional mobility deficits, address ROM deficits, address strength deficits, analyze and address soft tissue restrictions, analyze and cue movement patterns, analyze and modify body mechanics/ergonomics, assess and modify postural abnormalities and instruct in home and community integration to attain remaining goals. []  See Plan of Care  []  See progress note/recertification  []  See Discharge Summary         Progress towards goals / Updated goals:  Short Term Goals: To be accomplished in 6 treatments:  1. The Pt will be independent and compliant with their HEP- met  2. The Pt will report a 50% reduction in their pain with ADLs- progressing  Long Term Goals: To be accomplished in 12 treatments:  1. The Pt will score the MCII on her FOTO survey demonstrating improved overall function (62 to 67 points)- progressing  2. The Pt will be able to stand >/= 10 minutes with 0-4/10 pain to allow the Pt to be able to perform standing ADLs with less pain or discomfort- progressing  3.  The Pt will be able to walk >/= 4 city blocks with 0-4/10 pain to allow the Pt to be able to walk to work in [employer] with less pain or discomfort- progressing    PLAN  [x]  Upgrade activities as tolerated     [x]  Continue plan of care  [x]  Update interventions per flow sheet       []  Discharge due to:_  []  Other:_      Berna Macario, PT 6/20/2019

## 2019-06-21 ENCOUNTER — APPOINTMENT (OUTPATIENT)
Dept: PHYSICAL THERAPY | Age: 44
End: 2019-06-21
Payer: COMMERCIAL

## 2019-06-28 ENCOUNTER — HOSPITAL ENCOUNTER (OUTPATIENT)
Dept: PHYSICAL THERAPY | Age: 44
Discharge: HOME | End: 2019-06-28
Payer: COMMERCIAL

## 2019-06-28 PROCEDURE — 97110 THERAPEUTIC EXERCISES: CPT

## 2019-06-28 PROCEDURE — 97032 APPL MODALITY 1+ESTIM EA 15: CPT

## 2019-06-28 NOTE — PROGRESS NOTES
PT DAILY TREATMENT NOTE 2-15    Patient Name: Jasmine Cronin  Date:2019  : 1975  [x]  Patient  Verified  Payor: [address] [place] [address] Road / Plan: [address] / Product Type: Managed Care Medicaid /    In time: 11:03 AM  Out time: 12:05 PM  Total Treatment Time (min): 62 minutes  Visit #:  11    Treatment Area: Spondylosis without myelopathy or radiculopathy, lumbosacral region [M47.817]  Unequal limb length (acquired), unspecified site [M21.70]  Other secondary scoliosis, site unspecified [M41.50]    SUBJECTIVE  Pain Level (0-10 scale): 0/10  Any medication changes, allergies to medications, adverse drug reactions, diagnosis change, or new procedure performed?: [x] No    [] Yes (see summary sheet for update)  Subjective functional status/changes:   [] No changes reported  The Pt reports that the main \"spot\" that she was experiencing pain is better, but now she is noticing pain lower in the spine on both the R and L that she had never noticed before this. She has brought in the medical records that she found from all of the surgeries she had as a child and MRIs that she had 2 years ago.     OBJECTIVE  *Consent obtained to dry needle B L5 and S1 multifidi and R lumbar erector spinae  Modality rationale: decrease pain, increase tissue extensibility and increase muscle contraction/control to improve the patients ability to perform ADLs with less pain or discomfort   Min Type Additional Details      10 [x] Estim: [x]Att   []Unatt    []TENS instruct                  []IFC  []Premod   []NMES                     [x]Other: Direct current []w/US   []w/ice   []w/heat  Position: Prone  Location: B L5 and S1 multifidi and R lumbar erector spinae       []  Traction: [] Cervical       []Lumbar                       [] Prone          []Supine                       []Intermittent   []Continuous Lbs:  [] before manual  [] after manual  []w/heat    []  Ultrasound: []Continuous   [] Pulsed at: []1MHz   []3MHz Location:  W/cm2:    [] Paraffin         Location:   []w/heat    []  Ice     []  Heat  []  Ice massage Position:  Location:    []  Laser  []  Other: Position:  Location:      []  Vasopneumatic Device Pressure:       [] lo [] med [] hi   Temperature:      [x] Skin assessment post-treatment:  [x]intact []redness- no adverse reaction    []redness  adverse reaction:         47 min Therapeutic Exercise:  [x] See flow sheet :   Rationale: increase ROM, increase strength, improve coordination, improve balance and increase proprioception to improve the patients ability to perform ADLs with less pain or discomfort. 5 min Manual Therapy:  Dry needling to B L5 and S1 multifidi and R lumbar erector spinae   Rationale: decrease pain, increase ROM, increase tissue extensibility, decrease trigger points and increase postural awareness  to improve the patients ability to perform ADLs with less pain or discomfort. With   [x] TE   [] TA   [] neuro   [x] other: Patient Education: [x] Review HEP    [] Progressed/Changed HEP based on:   [] positioning   [] body mechanics   [] transfers   [] heat/ice application    [x] other: Pt educated on potential adverse effects and proper after care following dry needling     Other Objective/Functional Measures:   Before Dry Needling:  Hip abduction: R- 4+/5, L- 4+/5  Hip extension: R- 4+/5, L- 4+/5     After Dry Needling:  Hip abduction: R- 5/5, L- 5/5  Hip extension: R- 5/5, L- 5/5    Pain Level (0-10 scale) post treatment: 0/10    ASSESSMENT/Changes in Function:   The Pt tolerated the dry needling and her exercise program well. She was found to have increased turgor lower in the erector spinae bilaterally closer to the L5 area.   Patient will continue to benefit from skilled PT services to modify and progress therapeutic interventions, address functional mobility deficits, address ROM deficits, address strength deficits, analyze and address soft tissue restrictions, analyze and cue movement patterns, analyze and modify body mechanics/ergonomics, assess and modify postural abnormalities and instruct in home and community integration to attain remaining goals. []  See Plan of Care  []  See progress note/recertification  []  See Discharge Summary         Progress towards goals / Updated goals:  Short Term Goals: To be accomplished in 6 treatments:  1. The Pt will be independent and compliant with their HEP- met  2. The Pt will report a 50% reduction in their pain with ADLs- progressing  Long Term Goals: To be accomplished in 12 treatments:  1. The Pt will score the MCII on her FOTO survey demonstrating improved overall function (62 to 67 points)- progressing  2. The Pt will be able to stand >/= 10 minutes with 0-4/10 pain to allow the Pt to be able to perform standing ADLs with less pain or discomfort- progressing  3.  The Pt will be able to walk >/= 4 city blocks with 0-4/10 pain to allow the Pt to be able to walk to work in [employer] with less pain or discomfort- progressing     PLAN  [x]  Upgrade activities as tolerated     [x]  Continue plan of care  [x]  Update interventions per flow sheet       []  Discharge due to:_  []  Other:_      Loreta Wills, PT 6/28/2019

## 2019-06-29 DIAGNOSIS — E83.52 SERUM CALCIUM ELEVATED: Primary | ICD-10-CM

## 2019-06-29 LAB
BUN SERPL-MCNC: 23 MG/DL (ref 6–24)
BUN/CREAT SERPL: 21 (ref 9–23)
CALCIUM SERPL-MCNC: 10.7 MG/DL (ref 8.7–10.2)
CHLORIDE SERPL-SCNC: 104 MMOL/L (ref 96–106)
CO2 SERPL-SCNC: 24 MMOL/L (ref 20–29)
CREAT SERPL-MCNC: 1.11 MG/DL (ref 0.57–1)
GLUCOSE SERPL-MCNC: 82 MG/DL (ref 65–99)
POTASSIUM SERPL-SCNC: 4.7 MMOL/L (ref 3.5–5.2)
SODIUM SERPL-SCNC: 142 MMOL/L (ref 134–144)

## 2019-07-11 ENCOUNTER — OFFICE VISIT (OUTPATIENT)
Dept: INTERNAL MEDICINE CLINIC | Age: 44
End: 2019-07-11

## 2019-07-11 VITALS
BODY MASS INDEX: 26.68 KG/M2 | SYSTOLIC BLOOD PRESSURE: 109 MMHG | WEIGHT: 145 LBS | DIASTOLIC BLOOD PRESSURE: 71 MMHG | HEIGHT: 62 IN | OXYGEN SATURATION: 100 % | RESPIRATION RATE: 16 BRPM | HEART RATE: 72 BPM | TEMPERATURE: 98.2 F

## 2019-07-11 DIAGNOSIS — E83.52 HIGH CALCIUM LEVELS: Primary | ICD-10-CM

## 2019-07-11 DIAGNOSIS — I10 ESSENTIAL HYPERTENSION: ICD-10-CM

## 2019-07-11 RX ORDER — LISINOPRIL 10 MG/1
10 TABLET ORAL DAILY
Qty: 90 TAB | Refills: 1 | Status: SHIPPED | OUTPATIENT
Start: 2019-07-11 | End: 2020-02-27

## 2019-07-11 NOTE — PROGRESS NOTES
CC: Blood Pressure Check (follow up); Medication Evaluation (blood pressure medication); and Medication Refill (lisinopril 90 day)      HPI:    She is a 44 y.o. female who presents for evaluation of Hypertension     Blood pressure is well controlled on lisinopril    Rising calcium - discussed with patient, stopped multivitamin  Denies constipation  Depression  No hx of fractures    ROS:  Constitutional: negative for fevers, chills, anorexia and weight loss  Eyes:   negative for visual disturbance,  irritation  ENT:   negative for tinnitus,sore throat,nasal congestion,ear pain, sinus pain. Respiratory:  negative for cough, hemoptysis, dyspnea,wheezing  CV:   negative for chest pain, palpitations, lower extremity edema  GI:   negative for nausea, vomiting, diarrhea, abdominal pain,melena  Genitourinary: negative for frequency, dysuria, hematuria  Musculoskel: negative for myalgias, arthralgias, back pain, muscle weakness, joint pain  Neurological:  negative for headaches, dizziness, focal weakness, numbness  Psych:             Negative for depression and anxiety    Past Medical History:   Diagnosis Date    Club foot     had surgical correction    Hypertension        No current outpatient medications on file prior to visit. No current facility-administered medications on file prior to visit.         Past Surgical History:   Procedure Laterality Date    HX BREAST REDUCTION      HX OTHER SURGICAL  1980s    right club foot surgery, right hip surgery, right knee surgery    HX SPLENECTOMY      spleen laceration  leading to removal of spleen       Family History   Problem Relation Age of Onset    Diabetes Mother     Hypertension Mother     Coronary Artery Disease Father      Reviewed and no changes     Social History     Socioeconomic History    Marital status: SINGLE     Spouse name: Not on file    Number of children: Not on file    Years of education: Not on file    Highest education level: Not on file Occupational History    Not on file   Social Needs    Financial resource strain: Not on file    Food insecurity:     Worry: Not on file     Inability: Not on file    Transportation needs:     Medical: Not on file     Non-medical: Not on file   Tobacco Use    Smoking status: Light Tobacco Smoker     Types: Cigars    Smokeless tobacco: Never Used    Tobacco comment: Rare   Substance and Sexual Activity    Alcohol use: Yes     Frequency: 4 or more times a week     Drinks per session: 1 or 2    Drug use: Never    Sexual activity: Not Currently   Lifestyle    Physical activity:     Days per week: Not on file     Minutes per session: Not on file    Stress: Not on file   Relationships    Social connections:     Talks on phone: Not on file     Gets together: Not on file     Attends [employer] service: Not on file     Active member of club or organization: Not on file     Attends meetings of clubs or organizations: Not on file     Relationship status: Not on file    Intimate partner violence:     Fear of current or ex partner: Not on file     Emotionally abused: Not on file     Physically abused: Not on file     Forced sexual activity: Not on file   Other Topics Concern    Not on file   Social History Narrative    Not on file            Visit Vitals  /71 (BP 1 Location: Left arm, BP Patient Position: Sitting)   Pulse 72   Temp 98.2 °F (36.8 °C)   Resp 16   Ht 5' 2\" (1.575 m)   Wt 145 lb (65.8 kg)   SpO2 100%   BMI 26.52 kg/m²       Physical Examination:   General - Well appearing female  HEENT - PERRL, TM no erythema/opacification, normal nasal turbinates, oropharynx no erythema or exudate, MMM  Neck - supple, no bruits, no TMG, no LAD  Pulm - clear to auscultation bilaterally  Cardio - RRR, normal S1 S2, no murmur gallops or rubs  Abd - soft, nontender, no masses, no HSM  Extrem - no edema, +2 distal pulses  Psych - normal affect, appropriate mood    Lab Results   Component Value Date/Time    WBC 9.1 03/21/2019 09:09 AM    HGB 13.2 03/21/2019 09:09 AM    HCT 41.7 03/21/2019 09:09 AM    PLATELET 063 (H) 51/51/6603 09:09 AM    MCV 83 03/21/2019 09:09 AM     Lab Results   Component Value Date/Time    Sodium 142 06/28/2019 12:45 PM    Potassium 4.7 06/28/2019 12:45 PM    Chloride 104 06/28/2019 12:45 PM    CO2 24 06/28/2019 12:45 PM    Glucose 82 06/28/2019 12:45 PM    BUN 23 06/28/2019 12:45 PM    Creatinine 1.11 (H) 06/28/2019 12:45 PM    BUN/Creatinine ratio 21 06/28/2019 12:45 PM    GFR est AA 71 06/28/2019 12:45 PM    GFR est non-AA 61 06/28/2019 12:45 PM    Calcium 10.7 (H) 06/28/2019 12:45 PM     Lab Results   Component Value Date/Time    Cholesterol, total 240 (H) 03/21/2019 09:09 AM    HDL Cholesterol 72 03/21/2019 09:09 AM    LDL, calculated 139 (H) 03/21/2019 09:09 AM    VLDL, calculated 29 03/21/2019 09:09 AM    Triglyceride 147 03/21/2019 09:09 AM     No results found for: TSH, TSH2, TSH3, TSHP, TSHEXT, TSHEXT  No results found for: PSA, PSA2, PSAR1, PSA1, PSAR2, PSA3, PSAR3, [id number], [id number], PSALT  Lab Results   Component Value Date/Time    Hemoglobin A1c 5.9 (H) 03/21/2019 09:09 AM     No results found for: VITD3, XQVID2, XQVID3, XQVID, VD3RIA    No results found for: GPT, ALT, SGOT, GGT, GGTP, AP, APIT, APX, CBIL, TBIL, TBILI        Assessment/Plan:    1. Essential hypertension  - blood pressure is well controlled on current regimen  - lisinopril (PRINIVIL, ZESTRIL) 10 mg tablet; Take 1 Tab by mouth daily. Dispense: 90 Tab; Refill: 1    2. High calcium levels  - Rising. No symptoms. Stopped multivitamin. Repeat and check PTH   - METABOLIC PANEL, COMPREHENSIVE  - PTH INTACT      3.  Pre diabetes: counseled on diet      Kristi Sterling MD

## 2019-07-11 NOTE — PROGRESS NOTES
Chief Complaint   Patient presents with    Blood Pressure Check     follow up    Medication Evaluation     blood pressure medication    Medication Refill     lisinopril 90 day

## 2019-07-11 NOTE — PATIENT INSTRUCTIONS
Reviewed record in preparation for visit and have obtained necessary documentation. Identified pt with two pt identifiers(name and ). Chief Complaint Patient presents with  Blood Pressure Check  
  follow up  Medication Evaluation  
  blood pressure medication  Medication Refill  
  lisinopril 90 day Health Maintenance Due Topic Date Due  Pneumococcal Vaccine (1 of 3 - PCV13) 10/01/1982  Meningococcal (1 of 2 - Risk Bexsero 2-dose series) 10/01/1986  
 DTaP/Tdap/Td  (1 - Tdap) 10/01/1997  Pap Test  10/01/1997 Ms. Myriam Murrell has a reminder for a \"due or due soon\" health maintenance. I have asked that she discuss this further with her primary care provider for follow-up on this health maintenance. Coordination of Care Questionnaire: 
:  
 
1) Have you been to an emergency room, urgent care clinic since your last visit? No  
Hospitalized since your last visit? No 
 
2) Have you seen or consulted any other health care providers outside of [address] [place] [address] since your last visit? No 
 
 
3) In the event something were to happen to you and you were unable to speak on your behalf, do you have an Advance Directive/ Living Will in place stating your wishes? No 
Do you have an Advance Directive on file? No 
4) Are you interested in receiving information on Advance Directives? Yes 
 
 
Office Policies Phone calls/patient messages: Please allow up to 24 hours for someone in the office to contact you about your call or message. Be mindful your provider may be out of the office or your message may require further review. We encourage you to use [employer] for your messages as this is a faster, more efficient way to communicate with our office Medication Refills: 
         
Prescription medications require 48-72 business hours to process. We encourage you to use [employer] for your refills. For controlled medications: Please allow 72 business hours to process. Certain medications may require you to  a written prescription at our office. NO narcotic/controlled medications will be prescribed after 4pm Monday through Friday or on weekends Form/Paperwork Completion: 
         
Please note a $25 fee may incur for all paperwork for completed by our providers. We ask that you allow 7-10 business days. Pre-payment is due prior to picking up/faxing the completed form. You may also download your forms to [email or web address] to have your doctor print off.

## 2019-07-12 ENCOUNTER — HOSPITAL ENCOUNTER (OUTPATIENT)
Dept: PHYSICAL THERAPY | Age: 44
Discharge: HOME | End: 2019-07-12
Payer: COMMERCIAL

## 2019-07-12 LAB
25(OH)D3+25(OH)D2 SERPL-MCNC: 33.8 NG/ML (ref 30–100)
ALBUMIN SERPL-MCNC: 4.5 G/DL (ref 3.5–5.5)
ALBUMIN/GLOB SERPL: 1.7 {RATIO} (ref 1.2–2.2)
ALP SERPL-CCNC: 99 IU/L (ref 39–117)
ALT SERPL-CCNC: 29 IU/L (ref 0–32)
AST SERPL-CCNC: 22 IU/L (ref 0–40)
BILIRUB SERPL-MCNC: <0.2 MG/DL (ref 0–1.2)
BUN SERPL-MCNC: 26 MG/DL (ref 6–24)
BUN/CREAT SERPL: 21 (ref 9–23)
CALCIUM SERPL-MCNC: 10.4 MG/DL (ref 8.7–10.2)
CHLORIDE SERPL-SCNC: 106 MMOL/L (ref 96–106)
CO2 SERPL-SCNC: 19 MMOL/L (ref 20–29)
CREAT SERPL-MCNC: 1.25 MG/DL (ref 0.57–1)
GLOBULIN SER CALC-MCNC: 2.7 G/DL (ref 1.5–4.5)
GLUCOSE SERPL-MCNC: 93 MG/DL (ref 65–99)
POTASSIUM SERPL-SCNC: 4.5 MMOL/L (ref 3.5–5.2)
PROT SERPL-MCNC: 7.2 G/DL (ref 6–8.5)
PTH-INTACT SERPL-MCNC: 67 PG/ML (ref 15–65)
SODIUM SERPL-SCNC: 144 MMOL/L (ref 134–144)

## 2019-07-12 PROCEDURE — 97110 THERAPEUTIC EXERCISES: CPT

## 2019-07-12 PROCEDURE — 97032 APPL MODALITY 1+ESTIM EA 15: CPT

## 2019-07-12 NOTE — ANCILLARY DISCHARGE INSTRUCTIONS
[facility] [address] ([facility] [address] Elsi Waite Phone: [phone] Fax: [phone] Medicaid Discharge Summary  2-15 Patient name: Anthony Verduzco  : 1975  Provider#: [id number] Referral source: Teresa Saravia MD     
Medical/Treatment Diagnosis: Spondylosis without myelopathy or radiculopathy, lumbosacral region [M47.817] Unequal limb length (acquired), unspecified site [M21.70] Other secondary scoliosis, site unspecified [M41.50] Prior Hospitalization: see medical history Comorbidities: See Plan of Care Prior Level of Function:See Plan of Care Medications: Verified on Patient Summary List 
 
Start of Care: 19      Onset Date: Chronic Visits from Start of Care: 12    Missed Visits: 0 Reporting Period : 19 to 19 ASSESSMENT/SUMMARY OF CARE:  The Pt was seen for 12 outpatient physical therapy sessions with R-sided lower back pain. The Pt's therapy program focused on improving her lumbar spinal stability, improving her hip strength and stability, improving her activity tolerance and endurance, improving her balance and neuromuscular control, improving her core strength and stability, and reducing the turgor in her R lumbar paraspinals via therapeutic exercises, manual therapy techniques, pain relieving modalities, and functional dry needling. The Pt's R lumbar erector spinae pain improved greatly with the functional dry needling and she is now able to ambulate 4 blocks at an incline without a significant increase in her muscular pain. Her hip strength improved as well and she had better motor recruitment in both her hip abductors and extensors. Overall, she believes that she is 85% improved since beginning therapy. She would like to continue independently with her HEP at this time. The Pt was educated how to safely progress her HEP and voiced understanding.   The Pt is discharged from skilled PT and will contact her referring provider with any further questions or concerns. Thank you for this referral. 
 
Progress towards goals / Updated goals: 
Short Term Goals: To be accomplished in 6 treatments: 
1. The Pt will be independent and compliant with their HEP- met 2. The Pt will report a 50% reduction in their pain with ADLs- met Long Term Goals: To be accomplished in 12 treatments: 
1. The Pt will score the MCII on her FOTO survey demonstrating improved overall function (62 to 67 points)- progressing 2. The Pt will be able to stand >/= 10 minutes with 0-4/10 pain to allow the Pt to be able to perform standing ADLs with less pain or discomfort- met 3. The Pt will be able to walk >/= 4 city blocks with 0-4/10 pain to allow the Pt to be able to walk to work in [facility] with less pain or discomfort- met 
  
RECOMMENDATIONS: 
[x]Discontinue therapy: [x]Patient has reached or is progressing toward set goals []Patient is non-compliant or has abdicated 
    []Due to lack of appreciable progress towards set goals []Other Patti Tan, PT 7/12/2019  
 
 
______________________________________________________________________ NOTE TO PHYSICIAN:  Please complete the following and fax to: [facility]: Fax: [phone]. Retain this original for your records. If you are unable to process this request in 24 hours, please contact our office. [address] [place] [address] Signature:____________________  Date:____________Time:_________

## 2019-07-12 NOTE — PROGRESS NOTES
PT DAILY TREATMENT NOTE 2-15    Patient Name: Luis A Hurtado  Date:2019  : 1975  [x]  Patient  Verified  Payor: [address] [place] [address] Road / Plan: [address] / Product Type: Managed Care Medicaid /    In time: 12:00 PM  Out time: 1:00 PM  Total Treatment Time (min): 60 minutes  Visit #:  12    Treatment Area: Spondylosis without myelopathy or radiculopathy, lumbosacral region [M47.817]  Unequal limb length (acquired), unspecified site [M21.70]  Other secondary scoliosis, site unspecified [M41.50]    SUBJECTIVE  Pain Level (0-10 scale): 0/10  Any medication changes, allergies to medications, adverse drug reactions, diagnosis change, or new procedure performed?: [x] No    [] Yes (see summary sheet for update)  Subjective functional status/changes:   [] No changes reported  The Pt reports that she had pain relief for 1.5 weeks and then started to feel discomfort, but it was very mild. She reports that her pain is not in the original \"knot\", but a little lower. She reports that her pain is less limiting. She is able to walk up the 4 blocks on the incline without significant LBP that stops her. Overall, she believes that she is 85% improved since beginning therapy. OBJECTIVE  *Consent obtained to dry needle B L5 and S1 multifidi and R lumbar erector spinae  Modality rationale: decrease pain, increase tissue extensibility and increase muscle contraction/control to improve the patients ability to perform ADLs with less pain or discomfort.    Min Type Additional Details      10 [x] Estim: [x]Att   []Unatt    []TENS instruct                  []IFC  []Premod   []NMES                     [x]Other: Direct current []w/US   []w/ice   []w/heat  Position: Prone  Location: B L5 and S1 multifidi and R lumbar erector spinae       []  Traction: [] Cervical       []Lumbar                       [] Prone          []Supine                       []Intermittent   []Continuous Lbs:  [] before manual  [] after manual  []w/heat    []  Ultrasound: []Continuous   [] Pulsed                       at: []1MHz   []3MHz Location:  W/cm2:    [] Paraffin         Location:   []w/heat    []  Ice     []  Heat  []  Ice massage Position:  Location:    []  Laser  []  Other: Position:  Location:      []  Vasopneumatic Device Pressure:       [] lo [] med [] hi   Temperature:      [x] Skin assessment post-treatment:  [x]intact []redness- no adverse reaction    []redness  adverse reaction:         45 min Therapeutic Exercise:  [x] See flow sheet :   Rationale: increase ROM, increase strength, improve coordination, improve balance and increase proprioception to improve the patients ability to perform ADLs with less pain or discomfort. 5 min Manual Therapy:  Dry needling to B L5 and S1 multifidi and R lumbar erector spinae   Rationale: decrease pain, increase ROM, increase tissue extensibility, decrease trigger points and increase postural awareness  to improve the patients ability to perform ADLs with less pain or discomfort. With   [x] TE   [] TA   [] neuro   [x] other: Patient Education: [x] Review HEP    [] Progressed/Changed HEP based on:   [] positioning   [] body mechanics   [] transfers   [] heat/ice application    [x] other: Pt educated on potential adverse effects and proper after care following dry needling. Pt educated how to safely progress her HEP independently     Other Objective/Functional Measures:  FOTO 57/100 (62/100 at initial evaluation)   Before Dry Needling:  -Hip abduction: R- 4+/5, L- 4+/5  -Hip extension: R- 4+/5, L- 4+/5    After Dry Needling:  -Hip abduction: R- 5/5, L- 5/5  -Hip extension: R- 5/5, L- 5/5    Pain Level (0-10 scale) post treatment: 0/10    ASSESSMENT/Changes in Function:     []  See Plan of Care  []  See progress note/recertification  [x]  See Discharge Summary         Progress towards goals / Updated goals:  Short Term Goals: To be accomplished in 6 treatments:  1.  The Pt will be independent and compliant with their HEP- met  2. The Pt will report a 50% reduction in their pain with ADLs- met  Long Term Goals: To be accomplished in 12 treatments:  1. The Pt will score the MCII on her FOTO survey demonstrating improved overall function (62 to 67 points)- progressing   2. The Pt will be able to stand >/= 10 minutes with 0-4/10 pain to allow the Pt to be able to perform standing ADLs with less pain or discomfort- met  3.  The Pt will be able to walk >/= 4 city blocks with 0-4/10 pain to allow the Pt to be able to walk to work in [address] [place] [address] with less pain or discomfort- met    PLAN  []  Upgrade activities as tolerated     []  Continue plan of care  []  Update interventions per flow sheet       [x]  Discharge due to: Pt has met or is progressing well towards therapeutic goals  []  Other:_      Slim Rudolph, PT 7/12/2019

## 2019-07-22 NOTE — PROGRESS NOTES
Calcium remains elevated and PTH is elevated indicating there is likely a parathyroid adenoma / parathyroid scan ordered  Creatinine midlly elevated avoid NSAIDs ( ibuprofen, aleve)

## 2019-07-26 ENCOUNTER — APPOINTMENT (OUTPATIENT)
Dept: PHYSICAL THERAPY | Age: 44
End: 2019-07-26
Payer: COMMERCIAL

## 2019-08-01 ENCOUNTER — HOSPITAL ENCOUNTER (OUTPATIENT)
Dept: ULTRASOUND IMAGING | Age: 44
Discharge: HOME | End: 2019-08-01
Attending: INTERNAL MEDICINE
Payer: COMMERCIAL

## 2019-08-01 ENCOUNTER — HOSPITAL ENCOUNTER (OUTPATIENT)
Dept: NUCLEAR MEDICINE | Age: 44
Discharge: HOME | End: 2019-08-01
Attending: INTERNAL MEDICINE
Payer: COMMERCIAL

## 2019-08-01 DIAGNOSIS — E83.52 HIGH CALCIUM LEVELS: ICD-10-CM

## 2019-08-01 DIAGNOSIS — E83.52 HYPERCALCEMIA: ICD-10-CM

## 2019-08-01 PROCEDURE — 78070 PARATHYROID PLANAR IMAGING: CPT

## 2019-08-01 PROCEDURE — 76536 US EXAM OF HEAD AND NECK: CPT

## 2019-08-02 ENCOUNTER — APPOINTMENT (OUTPATIENT)
Dept: PHYSICAL THERAPY | Age: 44
End: 2019-08-02

## 2019-08-08 ENCOUNTER — APPOINTMENT (OUTPATIENT)
Dept: PHYSICAL THERAPY | Age: 44
End: 2019-08-08

## 2019-08-08 ENCOUNTER — HOSPITAL ENCOUNTER (OUTPATIENT)
Dept: MRI IMAGING | Age: 44
Discharge: HOME | End: 2019-08-08
Attending: PHYSICAL MEDICINE & REHABILITATION
Payer: COMMERCIAL

## 2019-08-08 DIAGNOSIS — M41.20 SCOLIOSIS (AND KYPHOSCOLIOSIS), IDIOPATHIC: ICD-10-CM

## 2019-08-08 DIAGNOSIS — M79.89 MASS OF SOFT TISSUE: ICD-10-CM

## 2019-08-08 DIAGNOSIS — M62.830 MUSCLE SPASM OF BACK: ICD-10-CM

## 2019-08-08 PROCEDURE — 72158 MRI LUMBAR SPINE W/O & W/DYE: CPT

## 2019-08-08 PROCEDURE — A9575 INJ GADOTERATE MEGLUMI 0.1ML: HCPCS | Performed by: PHYSICAL MEDICINE & REHABILITATION

## 2019-08-08 PROCEDURE — 74011250636 HC RX REV CODE- 250/636: Performed by: PHYSICAL MEDICINE & REHABILITATION

## 2019-08-08 RX ORDER — GADOTERATE MEGLUMINE 376.9 MG/ML
14 INJECTION INTRAVENOUS
Status: COMPLETED | OUTPATIENT
Start: 2019-08-08 | End: 2019-08-08

## 2019-08-08 RX ADMIN — GADOTERATE MEGLUMINE 15 ML: 376.9 INJECTION INTRAVENOUS at 10:10

## 2020-02-24 NOTE — PROGRESS NOTES
PT DAILY TREATMENT NOTE 2-15 Patient Name: Mitchell Mcallister Date:2019 : 1975 [x]  Patient  Verified Payor: [address] [place] [address] Road / Plan: [address] / Product Type: Managed Care Medicaid / In time:511  Out time:614 Total Treatment Time (min): 63 Visit #:  3 Treatment Area: Spondylosis without myelopathy or radiculopathy, lumbosacral region [M47.817] Unequal limb length (acquired), unspecified site [M21.70] Other secondary scoliosis, site unspecified [M41.50] SUBJECTIVE Pain Level (0-10 scale): 3/10 Any medication changes, allergies to medications, adverse drug reactions, diagnosis change, or new procedure performed?: [x] No    [] Yes (see summary sheet for update) Subjective functional status/changes:   [] No changes reported Patient reports she rode the bus the [facility] and didn't have any trouble, but when she went to go to the bathroom she had a charley horse which caused intense pain. It quickly subsided but she was afraid to do any of her HEP since because she didn't want it to come back. OBJECTIVE 63 min Therapeutic Exercise:  [x] See flow sheet :  
Rationale: increase ROM and increase strength to improve the patients ability to perform ADLs and reduce pain levels With 
 [] TE 
 [] TA 
 [] neuro 
 [] other: Patient Education: [x] Review HEP [] Progressed/Changed HEP based on:  
[] positioning   [] body mechanics   [] transfers   [] heat/ice application   
[] other:   
 
Other Objective/Functional Measures: none noted Pain Level (0-10 scale) post treatment: 0/10 ASSESSMENT/Changes in Function:  
Patient will require VCs on proper technique of therex. Little muscular endurance, needed multiple rest breaks due to fatigue. Will continue to progress as tolerated.  
Patient will continue to benefit from skilled PT services to modify and progress therapeutic interventions, address functional mobility deficits, address ROM deficits, address strength deficits, analyze and address soft tissue restrictions, analyze and cue movement patterns, analyze and modify body mechanics/ergonomics and assess and modify postural abnormalities to attain remaining goals. [x]  See Plan of Care 
[]  See progress note/recertification 
[]  See Discharge Summary Progress towards goals / Updated goals: 
Patient is progressing towards goals, will continue to strengthen the hip stabilizers in order to reduce pain levels PLAN [x]  Upgrade activities as tolerated     [x]  Continue plan of care [x]  Update interventions per flow sheet      
[]  Discharge due to:_ 
[]  Other:_ Theoplis Cabot T Isom 5/14/2019 [Follow-Up] : a follow-up visit [Asthma] : asthma [COPD] : COPD

## 2020-02-26 DIAGNOSIS — I10 ESSENTIAL HYPERTENSION: ICD-10-CM

## 2020-02-27 RX ORDER — LISINOPRIL 10 MG/1
TABLET ORAL
Qty: 30 TAB | Refills: 5 | Status: SHIPPED | OUTPATIENT
Start: 2020-02-27 | End: 2020-09-25

## 2020-08-10 ENCOUNTER — VIRTUAL VISIT (OUTPATIENT)
Dept: INTERNAL MEDICINE CLINIC | Age: 45
End: 2020-08-10
Payer: COMMERCIAL

## 2020-08-10 DIAGNOSIS — E78.00 HIGH CHOLESTEROL: ICD-10-CM

## 2020-08-10 DIAGNOSIS — E83.52 HIGH CALCIUM LEVELS: ICD-10-CM

## 2020-08-10 DIAGNOSIS — R73.03 PRE-DIABETES: ICD-10-CM

## 2020-08-10 DIAGNOSIS — G89.29 CHRONIC BILATERAL LOW BACK PAIN WITHOUT SCIATICA: ICD-10-CM

## 2020-08-10 DIAGNOSIS — M54.50 CHRONIC BILATERAL LOW BACK PAIN WITHOUT SCIATICA: ICD-10-CM

## 2020-08-10 DIAGNOSIS — I10 ESSENTIAL HYPERTENSION: Primary | ICD-10-CM

## 2020-08-10 PROCEDURE — 99214 OFFICE O/P EST MOD 30 MIN: CPT | Performed by: INTERNAL MEDICINE

## 2020-08-10 NOTE — PROGRESS NOTES
Reviewed record in preparation for visit and have obtained necessary documentation. Identified pt with two pt identifiers(name and ). Chief Complaint   Patient presents with    Hypertension       Health Maintenance Due   Topic Date Due    Meningococcal (1 of 4 - Increased Risk Bexsero 2-dose series) 10/01/1986    DTaP/Tdap/Td  (1 - Tdap) 10/01/1997    Pap Test  10/01/1997    Flu Vaccine  2020       Ms. Chacorta Knight has a reminder for a \"due or due soon\" health maintenance. I have asked that she discuss this further with her primary care provider for follow-up on this health maintenance. Coordination of Care Questionnaire:  :     1) Have you been to an emergency room, urgent care clinic since your last visit? no   Hospitalized since your last visit? no             2) Have you seen or consulted any other health care providers outside of [address] [place] [address] since your last visit? no  (Include any pap smears or colon screenings in this section.)    3) In the event something were to happen to you and you were unable to speak on your behalf, do you have an Advance Directive/ Living Will in place stating your wishes? NO    Do you have an Advance Directive on file? no    4) Are you interested in receiving information on Advance Directives? NO    Patient is accompanied by self I have received verbal consent from [facility] to discuss any/all medical information while they are present in the room.

## 2020-08-10 NOTE — PROGRESS NOTES
CC: Hypertension      HPI:    She is a 38 y.o. female who presents for evaluation of HTN  Taking lisinopril 10mg daily   Reports normal BP at home 120/s 80s   Denies dizziness, chest pain and Orthopnea    Has chronic pain  In back  And seing Dr Nelly Opitz     Hx of prediabetes - not following diabetic diet \" since pandemic\" eating more        This is an established visit conducted via telemedicine with video. The patient has been instructed that this meets HIPAA criteria and acknowledges and agrees to this method of visitation. Pursuant to the emergency declaration under the [facility], [facility] waiver authority and the [employer] Act, this Virtual Visit was conducted, with patient's consent, to reduce the patient's risk of exposure to COVID-19 and provide continuity of care for an established patient. Services were provided through a video synchronous discussion virtually to substitute for in-person clinic visit. ROS:  Constitutional: negative for fevers, chills, anorexia and weight loss  10 systems reviewed and negative other than HPI     Past Medical History:   Diagnosis Date    Club foot     had surgical correction    Hypertension        Current Outpatient Medications on File Prior to Visit   Medication Sig Dispense Refill    lisinopril (PRINIVIL, ZESTRIL) 10 mg tablet TAKE 1 TABLET BY MOUTH EVERY DAY 30 Tab 5     No current facility-administered medications on file prior to visit.         Past Surgical History:   Procedure Laterality Date    HX BREAST REDUCTION      HX OTHER SURGICAL  1980s    right club foot surgery, right hip surgery, right knee surgery    HX SPLENECTOMY      spleen laceration  leading to removal of spleen       Family History   Problem Relation Age of Onset    Diabetes Mother     Hypertension Mother     Coronary Artery Disease Father      Reviewed and no changes     Social History Socioeconomic History    Marital status: SINGLE     Spouse name: Not on file    Number of children: Not on file    Years of education: Not on file    Highest education level: Not on file   Occupational History    Not on file   Social Needs    Financial resource strain: Not on file    Food insecurity     Worry: Not on file     Inability: Not on file    Transportation needs     Medical: Not on file     Non-medical: Not on file   Tobacco Use    Smoking status: Light Tobacco Smoker     Types: Cigars    Smokeless tobacco: Never Used    Tobacco comment: Rare   Substance and Sexual Activity    Alcohol use: Yes     Frequency: 4 or more times a week     Drinks per session: 1 or 2    Drug use: Never    Sexual activity: Not Currently   Lifestyle    Physical activity     Days per week: Not on file     Minutes per session: Not on file    Stress: Not on file   Relationships    Social connections     Talks on phone: Not on file     Gets together: Not on file     Attends Islam service: Not on file     Active member of club or organization: Not on file     Attends meetings of clubs or organizations: Not on file     Relationship status: Not on file    Intimate partner violence     Fear of current or ex partner: Not on file     Emotionally abused: Not on file     Physically abused: Not on file     Forced sexual activity: Not on file   Other Topics Concern    Not on file   Social History Narrative    Not on file          There were no vitals taken for this visit. Physical Examination:   Gen: well appearing female  HEENT: normal conjunctiva, no audible congestion, patient does not see oral erythema, has MMM  Neck: patient does not feel enlarged or tender LAD or masses  Resp: normal respiratory effort, no audible wheezing.    CV: patient does not feel palpitations or heart irregularity  Abd: patient does not feel abdominal tenderness or mass, patient does not notice distension  Extrem: patient does not see swelling in ankles or joints. Neuro: Alert and oriented, able to answer questions without difficulty, able to move all extremities and walk normally          Lab Results   Component Value Date/Time    WBC 9.1 03/21/2019 09:09 AM    HGB 13.2 03/21/2019 09:09 AM    HCT 41.7 03/21/2019 09:09 AM    PLATELET 379 (H) 81/86/9910 09:09 AM    MCV 83 03/21/2019 09:09 AM     Lab Results   Component Value Date/Time    Sodium 144 07/11/2019 03:03 PM    Potassium 4.5 07/11/2019 03:03 PM    Chloride 106 07/11/2019 03:03 PM    CO2 19 (L) 07/11/2019 03:03 PM    Glucose 93 07/11/2019 03:03 PM    BUN 26 (H) 07/11/2019 03:03 PM    Creatinine 1.25 (H) 07/11/2019 03:03 PM    BUN/Creatinine ratio 21 07/11/2019 03:03 PM    GFR est AA 61 07/11/2019 03:03 PM    GFR est non-AA 53 (L) 07/11/2019 03:03 PM    Calcium 10.4 (H) 07/11/2019 03:03 PM     Lab Results   Component Value Date/Time    Cholesterol, total 240 (H) 03/21/2019 09:09 AM    HDL Cholesterol 72 03/21/2019 09:09 AM    LDL, calculated 139 (H) 03/21/2019 09:09 AM    VLDL, calculated 29 03/21/2019 09:09 AM    Triglyceride 147 03/21/2019 09:09 AM     No results found for: TSH, TSH2, TSH3, TSHP, TSHEXT, TSHEXT  No results found for: PSA, Gene Siad, PSAR3, [id number], [id number], PSALT  Lab Results   Component Value Date/Time    Hemoglobin A1c 5.9 (H) 03/21/2019 09:09 AM     Lab Results   Component Value Date/Time    VITAMIN D, 25-HYDROXY 33.8 07/11/2019 03:03 PM       Lab Results   Component Value Date/Time    ALT (SGPT) 29 07/11/2019 03:03 PM    Alk. phosphatase 99 07/11/2019 03:03 PM    Bilirubin, total <0.2 07/11/2019 03:03 PM           Assessment/Plan:    1. Essential hypertension  Blood pressure is well controlled on lisinopril 10mg daily   - METABOLIC PANEL, COMPREHENSIVE  - CBC WITH AUTOMATED DIFF    2. High calcium levels  Had negative parathyroid scan , last calcium was 10.4 and asymptomatic  - METABOLIC PANEL, COMPREHENSIVE    3.  Chronic bilateral low back pain without sciatica  Seen Dr Chester Lundborg    4. Pre-diabetes  -counseled on diabetes   - HEMOGLOBIN A1C W/O EAG    5. High cholesterol  Lifestyle medications  - LIPID PANEL    Follow up in 6 months         Jyothi Navarro MD    This is an established visit conducted via real time video and audio telemedicine. The patient has been instructed that this meets HIPAA criteria and acknowledges and agrees to this method of visitation.

## 2020-08-25 ENCOUNTER — HOSPITAL ENCOUNTER (OUTPATIENT)
Dept: MRI IMAGING | Age: 45
Discharge: HOME | End: 2020-08-25
Attending: NURSE PRACTITIONER
Payer: COMMERCIAL

## 2020-08-25 DIAGNOSIS — M54.31 RIGHT SIDED SCIATICA: ICD-10-CM

## 2020-08-25 DIAGNOSIS — M54.50 LOW BACK PAIN, UNSPECIFIED BACK PAIN LATERALITY, UNSPECIFIED CHRONICITY, UNSPECIFIED WHETHER SCIATICA PRESENT: ICD-10-CM

## 2020-08-25 DIAGNOSIS — M51.36 DDD (DEGENERATIVE DISC DISEASE), LUMBAR: ICD-10-CM

## 2020-08-25 PROCEDURE — 72148 MRI LUMBAR SPINE W/O DYE: CPT

## 2020-08-30 ENCOUNTER — HOSPITAL ENCOUNTER (EMERGENCY)
Age: 45
Discharge: HOME | End: 2020-08-31
Attending: EMERGENCY MEDICINE
Payer: COMMERCIAL

## 2020-08-30 DIAGNOSIS — N93.9 VAGINAL BLEEDING: ICD-10-CM

## 2020-08-30 DIAGNOSIS — N94.89 ENDOMETRIAL MASS: ICD-10-CM

## 2020-08-30 DIAGNOSIS — R10.30 LOWER ABDOMINAL PAIN: Primary | ICD-10-CM

## 2020-08-30 PROCEDURE — 96375 TX/PRO/DX INJ NEW DRUG ADDON: CPT

## 2020-08-30 PROCEDURE — 99285 EMERGENCY DEPT VISIT HI MDM: CPT

## 2020-08-30 PROCEDURE — 96374 THER/PROPH/DIAG INJ IV PUSH: CPT

## 2020-08-30 PROCEDURE — 96376 TX/PRO/DX INJ SAME DRUG ADON: CPT

## 2020-08-31 ENCOUNTER — TELEPHONE (OUTPATIENT)
Dept: INTERNAL MEDICINE CLINIC | Age: 45
End: 2020-08-31

## 2020-08-31 ENCOUNTER — APPOINTMENT (OUTPATIENT)
Dept: CT IMAGING | Age: 45
End: 2020-08-31
Attending: EMERGENCY MEDICINE
Payer: COMMERCIAL

## 2020-08-31 VITALS
TEMPERATURE: 98.6 F | SYSTOLIC BLOOD PRESSURE: 104 MMHG | HEART RATE: 79 BPM | WEIGHT: 142.64 LBS | OXYGEN SATURATION: 96 % | RESPIRATION RATE: 16 BRPM | HEIGHT: 62 IN | DIASTOLIC BLOOD PRESSURE: 68 MMHG | BODY MASS INDEX: 26.25 KG/M2

## 2020-08-31 LAB
ANION GAP SERPL CALC-SCNC: ABNORMAL MMOL/L (ref 5–15)
APPEARANCE UR: CLEAR
BACTERIA URNS QL MICRO: ABNORMAL /HPF
BASOPHILS # BLD: 0.1 K/UL (ref 0–0.1)
BASOPHILS NFR BLD: 1 % (ref 0–1)
BILIRUB UR QL: NEGATIVE
BUN SERPL-MCNC: 20 MG/DL (ref 6–20)
BUN/CREAT SERPL: 17 (ref 12–20)
CALCIUM SERPL-MCNC: 9.5 MG/DL (ref 8.5–10.1)
CHLORIDE SERPL-SCNC: 109 MMOL/L (ref 97–108)
CO2 SERPL-SCNC: 30 MMOL/L (ref 21–32)
COLOR UR: ABNORMAL
CREAT SERPL-MCNC: 1.21 MG/DL (ref 0.55–1.02)
DIFFERENTIAL METHOD BLD: ABNORMAL
EOSINOPHIL # BLD: 0.2 K/UL (ref 0–0.4)
EOSINOPHIL NFR BLD: 2 % (ref 0–7)
EPITH CASTS URNS QL MICRO: ABNORMAL /LPF
ERYTHROCYTE [DISTWIDTH] IN BLOOD BY AUTOMATED COUNT: 15.2 % (ref 11.5–14.5)
GLUCOSE SERPL-MCNC: 103 MG/DL (ref 65–100)
GLUCOSE UR STRIP.AUTO-MCNC: NEGATIVE MG/DL
HCG UR QL: NEGATIVE
HCT VFR BLD AUTO: 37.7 % (ref 35–47)
HGB BLD-MCNC: 11.7 G/DL (ref 11.5–16)
HGB UR QL STRIP: ABNORMAL
HYALINE CASTS URNS QL MICRO: ABNORMAL /LPF (ref 0–5)
IMM GRANULOCYTES # BLD AUTO: 0.1 K/UL (ref 0–0.04)
IMM GRANULOCYTES NFR BLD AUTO: 1 % (ref 0–0.5)
KETONES UR QL STRIP.AUTO: NEGATIVE MG/DL
LACTATE BLD-SCNC: 0.54 MMOL/L (ref 0.4–2)
LEUKOCYTE ESTERASE UR QL STRIP.AUTO: NEGATIVE
LYMPHOCYTES # BLD: 2.9 K/UL (ref 0.8–3.5)
LYMPHOCYTES NFR BLD: 28 % (ref 12–49)
MCH RBC QN AUTO: 26.6 PG (ref 26–34)
MCHC RBC AUTO-ENTMCNC: 31 G/DL (ref 30–36.5)
MCV RBC AUTO: 85.7 FL (ref 80–99)
MONOCYTES # BLD: 1.9 K/UL (ref 0–1)
MONOCYTES NFR BLD: 18 % (ref 5–13)
NEUTS SEG # BLD: 5.3 K/UL (ref 1.8–8)
NEUTS SEG NFR BLD: 50 % (ref 32–75)
NITRITE UR QL STRIP.AUTO: NEGATIVE
NRBC # BLD: 0 K/UL (ref 0–0.01)
NRBC BLD-RTO: 0 PER 100 WBC
PH UR STRIP: 5 [PH] (ref 5–8)
PLATELET # BLD AUTO: 331 K/UL (ref 150–400)
PMV BLD AUTO: 10.5 FL (ref 8.9–12.9)
POTASSIUM SERPL-SCNC: 4.8 MMOL/L (ref 3.5–5.1)
PROT UR STRIP-MCNC: NEGATIVE MG/DL
RBC # BLD AUTO: 4.4 M/UL (ref 3.8–5.2)
RBC #/AREA URNS HPF: ABNORMAL /HPF (ref 0–5)
SODIUM SERPL-SCNC: 138 MMOL/L (ref 136–145)
SP GR UR REFRACTOMETRY: 1.02 (ref 1–1.03)
UA: UC IF INDICATED,UAUC: ABNORMAL
UROBILINOGEN UR QL STRIP.AUTO: 1 EU/DL (ref 0.2–1)
WBC # BLD AUTO: 10.3 K/UL (ref 3.6–11)
WBC URNS QL MICRO: ABNORMAL /HPF (ref 0–4)

## 2020-08-31 PROCEDURE — 74177 CT ABD & PELVIS W/CONTRAST: CPT

## 2020-08-31 PROCEDURE — 85025 COMPLETE CBC W/AUTO DIFF WBC: CPT

## 2020-08-31 PROCEDURE — 83605 ASSAY OF LACTIC ACID: CPT

## 2020-08-31 PROCEDURE — 36415 COLL VENOUS BLD VENIPUNCTURE: CPT

## 2020-08-31 PROCEDURE — 81001 URINALYSIS AUTO W/SCOPE: CPT

## 2020-08-31 PROCEDURE — 80048 BASIC METABOLIC PNL TOTAL CA: CPT

## 2020-08-31 PROCEDURE — 74011250636 HC RX REV CODE- 250/636: Performed by: EMERGENCY MEDICINE

## 2020-08-31 PROCEDURE — 81025 URINE PREGNANCY TEST: CPT

## 2020-08-31 PROCEDURE — 96374 THER/PROPH/DIAG INJ IV PUSH: CPT

## 2020-08-31 PROCEDURE — 96376 TX/PRO/DX INJ SAME DRUG ADON: CPT

## 2020-08-31 PROCEDURE — 96375 TX/PRO/DX INJ NEW DRUG ADDON: CPT

## 2020-08-31 PROCEDURE — 74011000636 HC RX REV CODE- 636: Performed by: EMERGENCY MEDICINE

## 2020-08-31 RX ORDER — OXYCODONE AND ACETAMINOPHEN 10; 325 MG/1; MG/1
1 TABLET ORAL
Qty: 18 TAB | Refills: 0 | Status: SHIPPED | OUTPATIENT
Start: 2020-08-31 | End: 2020-09-05

## 2020-08-31 RX ORDER — MORPHINE SULFATE 4 MG/ML
4 INJECTION INTRAVENOUS ONCE
Status: COMPLETED | OUTPATIENT
Start: 2020-08-31 | End: 2020-08-31

## 2020-08-31 RX ORDER — SODIUM CHLORIDE 0.9 % (FLUSH) 0.9 %
10 SYRINGE (ML) INJECTION
Status: COMPLETED | OUTPATIENT
Start: 2020-08-31 | End: 2020-08-31

## 2020-08-31 RX ORDER — MORPHINE SULFATE 4 MG/ML
INJECTION INTRAVENOUS
Status: DISCONTINUED
Start: 2020-08-31 | End: 2020-08-31 | Stop reason: HOSPADM

## 2020-08-31 RX ORDER — ONDANSETRON 2 MG/ML
4 INJECTION INTRAMUSCULAR; INTRAVENOUS
Status: COMPLETED | OUTPATIENT
Start: 2020-08-31 | End: 2020-08-31

## 2020-08-31 RX ADMIN — Medication 10 ML: at 01:52

## 2020-08-31 RX ADMIN — ONDANSETRON 4 MG: 2 INJECTION INTRAMUSCULAR; INTRAVENOUS at 02:11

## 2020-08-31 RX ADMIN — IOPAMIDOL 100 ML: 755 INJECTION, SOLUTION INTRAVENOUS at 01:52

## 2020-08-31 RX ADMIN — MORPHINE SULFATE 4 MG: 4 INJECTION, SOLUTION INTRAMUSCULAR; INTRAVENOUS at 05:53

## 2020-08-31 RX ADMIN — MORPHINE SULFATE 4 MG: 4 INJECTION, SOLUTION INTRAMUSCULAR; INTRAVENOUS at 02:11

## 2020-08-31 RX ADMIN — SODIUM CHLORIDE 1000 ML: 900 INJECTION, SOLUTION INTRAVENOUS at 02:11

## 2020-08-31 NOTE — DISCHARGE INSTRUCTIONS
Your CT scan showed a mass in your endometrium (uterine wall). This is likely where your bleeding is coming from. It is also likely that this is what is causing your pain. You should follow-up with your gynecologist preferably this week for further diagnostic work-up in order to identify what type of mass is seen on the CT. Your kidney on the CT scan today is smaller than it used to be. In addition, your CT also shows potentially abnormal blood flow to your kidney. Your kidney function is normal, however. I recommend that you follow up with your primary care doctor this week. Tell your doctor to review your results from today's visit with us. They can refer you to a urologist if needed.

## 2020-08-31 NOTE — TELEPHONE ENCOUNTER
MD Herman Palma LPN    Caller: Unspecified (Today,  7:58 AM)               Orders for labs in   Appears pt went to ER and had urinalysis done there      MyChart message sent to patient regarding her lab orders
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
## 2020-09-25 DIAGNOSIS — I10 ESSENTIAL HYPERTENSION: ICD-10-CM

## 2020-09-25 RX ORDER — LISINOPRIL 10 MG/1
TABLET ORAL
Qty: 30 TAB | Refills: 5 | Status: SHIPPED | OUTPATIENT
Start: 2020-09-25 | End: 2021-03-25 | Stop reason: SDUPTHER

## 2020-10-01 LAB
ALBUMIN SERPL-MCNC: 4.6 G/DL (ref 3.8–4.8)
ALBUMIN/GLOB SERPL: 1.8 {RATIO} (ref 1.2–2.2)
ALP SERPL-CCNC: 79 IU/L (ref 39–117)
ALT SERPL-CCNC: 13 IU/L (ref 0–32)
AST SERPL-CCNC: 14 IU/L (ref 0–40)
BASOPHILS # BLD AUTO: 0 X10E3/UL (ref 0–0.2)
BASOPHILS NFR BLD AUTO: 0 %
BILIRUB SERPL-MCNC: 0.2 MG/DL (ref 0–1.2)
BUN SERPL-MCNC: 20 MG/DL (ref 6–24)
BUN/CREAT SERPL: 17 (ref 9–23)
CALCIUM SERPL-MCNC: 10.4 MG/DL (ref 8.7–10.2)
CHLORIDE SERPL-SCNC: 105 MMOL/L (ref 96–106)
CHOLEST SERPL-MCNC: 199 MG/DL (ref 100–199)
CO2 SERPL-SCNC: 20 MMOL/L (ref 20–29)
CREAT SERPL-MCNC: 1.18 MG/DL (ref 0.57–1)
EOSINOPHIL # BLD AUTO: 0.1 X10E3/UL (ref 0–0.4)
EOSINOPHIL NFR BLD AUTO: 1 %
ERYTHROCYTE [DISTWIDTH] IN BLOOD BY AUTOMATED COUNT: 14.6 % (ref 11.7–15.4)
GLOBULIN SER CALC-MCNC: 2.5 G/DL (ref 1.5–4.5)
GLUCOSE SERPL-MCNC: 109 MG/DL (ref 65–99)
HBA1C MFR BLD: 5.4 % (ref 4.8–5.6)
HCT VFR BLD AUTO: 31.7 % (ref 34–46.6)
HDLC SERPL-MCNC: 61 MG/DL
HGB BLD-MCNC: 9.9 G/DL (ref 11.1–15.9)
IMM GRANULOCYTES # BLD AUTO: 0.1 X10E3/UL (ref 0–0.1)
IMM GRANULOCYTES NFR BLD AUTO: 1 %
LDLC SERPL CALC-MCNC: 107 MG/DL (ref 0–99)
LYMPHOCYTES # BLD AUTO: 2.5 X10E3/UL (ref 0.7–3.1)
LYMPHOCYTES NFR BLD AUTO: 23 %
MCH RBC QN AUTO: 26.7 PG (ref 26.6–33)
MCHC RBC AUTO-ENTMCNC: 31.2 G/DL (ref 31.5–35.7)
MCV RBC AUTO: 85 FL (ref 79–97)
MONOCYTES # BLD AUTO: 1 X10E3/UL (ref 0.1–0.9)
MONOCYTES NFR BLD AUTO: 9 %
NEUTROPHILS # BLD AUTO: 7.3 X10E3/UL (ref 1.4–7)
NEUTROPHILS NFR BLD AUTO: 66 %
PLATELET # BLD AUTO: 463 X10E3/UL (ref 150–450)
POTASSIUM SERPL-SCNC: 4.5 MMOL/L (ref 3.5–5.2)
PROT SERPL-MCNC: 7.1 G/DL (ref 6–8.5)
RBC # BLD AUTO: 3.71 X10E6/UL (ref 3.77–5.28)
SODIUM SERPL-SCNC: 139 MMOL/L (ref 134–144)
TRIGL SERPL-MCNC: 183 MG/DL (ref 0–149)
VLDLC SERPL CALC-MCNC: 31 MG/DL (ref 5–40)
WBC # BLD AUTO: 10.9 X10E3/UL (ref 3.4–10.8)

## 2020-10-03 NOTE — PROGRESS NOTES
Please call patient back about results  The patient has stable mild elevation of her calcium this is already been evaluated by PCP    CBC however is abnormal with elevated white count new anemia which appears to be normocytic and elevated platelets see if ferritin and iron can be added to the blood already at the lab set up with hematology for further evaluation    Also make sure she has seen gynecology regarding her abnormal CT scan

## 2020-10-06 ENCOUNTER — TELEPHONE (OUTPATIENT)
Dept: INTERNAL MEDICINE CLINIC | Age: 45
End: 2020-10-06

## 2020-10-06 NOTE — TELEPHONE ENCOUNTER
Spoke with patient. Two pt identifiers confirmed. Patient advised of her recent lab results. Patient states that she has been seeing her OBGYN for issues that she has been having with abnormal bleeding and her abnormal CT. Patient thinks that this is the reason that her CBC is abnormal.  Patient wants to know does she still need to have the additional labs drawn and should she still be set up to see hematology. Advised patient that I will check with the provider that reviewed her labs and will give her a call back as soon as I can. Pt verbalized understanding of information discussed w/ no further questions at this time.

## 2020-10-06 NOTE — TELEPHONE ENCOUNTER
Pt states [facility] did not get refill for Lisinopril, but I called pharm myself and yes, they do have this on hand and ready for pt. Pt would like a call back to go over lab results with her.   Thanks #[phone]

## 2020-10-06 NOTE — TELEPHONE ENCOUNTER
Eileen Covarrubias MD   10/3/2020  1:51 PM       Please call patient back about results   The patient has stable mild elevation of her calcium this is already been evaluated by PCP       CBC however is abnormal with elevated white count new anemia which appears to be normocytic and elevated platelets see if ferritin and iron can be added to the blood already at the lab set up with hematology for further evaluation       Also make sure she has seen gynecology regarding her abnormal CT scan

## 2020-10-08 NOTE — TELEPHONE ENCOUNTER
Elaine Gil, MD Liset Saez, LPN    Caller: Unspecified (2 days ago, 10:59 AM)               She will need to follow through with Dr. Carol Koenig message sent to patient

## 2020-12-09 ENCOUNTER — TRANSCRIBE ORDER (OUTPATIENT)
Dept: SCHEDULING | Age: 45
End: 2020-12-09

## 2020-12-09 DIAGNOSIS — N18.2 CHRONIC KIDNEY DISEASE, STAGE II (MILD): Primary | ICD-10-CM

## 2020-12-18 ENCOUNTER — HOSPITAL ENCOUNTER (OUTPATIENT)
Dept: ULTRASOUND IMAGING | Age: 45
Discharge: HOME | End: 2020-12-18
Payer: COMMERCIAL

## 2020-12-18 DIAGNOSIS — N18.2 CHRONIC KIDNEY DISEASE, STAGE II (MILD): ICD-10-CM

## 2020-12-18 PROCEDURE — 76770 US EXAM ABDO BACK WALL COMP: CPT

## 2021-01-28 ENCOUNTER — HOSPITAL ENCOUNTER (OUTPATIENT)
Dept: PHYSICAL THERAPY | Age: 46
Discharge: HOME | End: 2021-01-28
Payer: COMMERCIAL

## 2021-01-28 PROCEDURE — 97162 PT EVAL MOD COMPLEX 30 MIN: CPT

## 2021-01-28 NOTE — PROGRESS NOTES
[facility]. Physical Therapy  [address] [place]  Phone: [phone] Fax: [phone]    Plan of Care/Statement of Necessity for Physical Therapy Services  2-15    Patient name: Shawna Opitz  : 1975  Provider#: [id number]  Referral source: Jacob Coffman MD      Medical/Treatment Diagnosis: Low back pain [M54.5]  Pain in right hip [M25.551]     Prior Hospitalization: see medical history     Comorbidities: Arthritis, back pain, HTN, osteoporosis, prior surgery  Prior Level of Function: The patient completed 20 minutes of exercise seldom or never  Medications: Verified on Patient Summary List    Start of Care: 22      Onset Date: Chronic       The Plan of Care and following information is based on the information from the initial evaluation. Assessment/ key information: The Pt is a pleasant and motivated 41year old female who presents to therapy with chronic LBP. The Pt has been to therapy before the same diagnosis and responded very well with functional dry needling. The Pt displays poor hip and core strength and stability, restrictions in her R hip ROM, increased turgor and restriction in the R lumbar erector spinae, decreased balance and neuromuscular control, gait impairments, and decreased activity tolerance and endurance. The patient would benefit from skilled physical therapy to help improve the above listed impairments to allow the patient to safely return to their prior level of function with less overall pain or risk of further injury. The patient has a good prognosis with skilled physical therapy.     Evaluation Complexity History MEDIUM  Complexity : 1-2 comorbidities / personal factors will impact the outcome/ POC ; Examination MEDIUM Complexity : 3 Standardized tests and measures addressing body structure, function, activity limitation and / or participation in recreation  ;Presentation MEDIUM Complexity : Evolving with changing characteristics  ; Clinical Decision Making MEDIUM Complexity : FOTO score of 26-74  Overall Complexity Rating: MEDIUM    Problem List: pain affecting function, decrease ROM, decrease strength, impaired gait/ balance, decrease ADL/ functional abilitiies, decrease activity tolerance, decrease flexibility/ joint mobility and decrease transfer abilities   Treatment Plan may include any combination of the following: Therapeutic exercise, Therapeutic activities, Neuromuscular re-education, Physical agent/modality, Gait/balance training, Manual therapy, Patient education, Self Care training, Functional mobility training, Home safety training, Stair training and Other: Functional Dry Needling  Patient / Family readiness to learn indicated by: asking questions and interest  Persons(s) to be included in education: patient (P)  Barriers to Learning/Limitations: None  Patient Goal (s): recommit to certain exercise to relieve pain  Patient Self Reported Health Status: fair  Rehabilitation Potential: good    Short Term Goals: To be accomplished in 5 treatments:  1. The Pt will be independent and compliant with their HEP. 2. The Pt will report a 50% reduction in their pain with ADLs. Long Term Goals: To be accomplished in 10 treatments:  1. The Pt will score the MCII on her FOTO survey demonstrating improved overall function (54 to 57 points). 2. The Pt will be able to stand/walk >/= 1 hr with 0-3/10 pain to allow the Pt to be able to perform standing ADLs with less pain. 3. The Pt will be able to hold 20 lbs with 0-3/10 pain to allow the Pt to be able to carry a case of water this less pain. Frequency / Duration: Patient to be seen 1-2 times per week for 10 treatments.     Patient/ Caregiver education and instruction: self care and activity modification    [x]  Plan of care has been reviewed with SELWYN Louis, PT 1/28/2021 ________________________________________________________________________    I certify that the above Therapy Services are being furnished while the patient is under my care. I agree with the treatment plan and certify that this therapy is necessary.     [de-identified] Signature:____________________  Date:____________Time: _________

## 2021-01-28 NOTE — PROGRESS NOTES
PT INITIAL EVALUATION NOTE 2-15    Patient Name: Jessica Mcintyre  Date:2021  : 1975  [x]  Patient  Verified  Payor: [address] [place] [address] Road / Plan: [address] / Product Type: Managed Care Medicaid /    In time:10:25 AM  Out time: 11:06 AM  Total Treatment Time (min): 41 minutes  Visit #: 1     Treatment Area: Low back pain [M54.5]  Pain in right hip [M25.551]    SUBJECTIVE  Pain Level (0-10 scale): 4/10  Any medication changes, allergies to medications, adverse drug reactions, diagnosis change, or new procedure performed?: [] No    [x] Yes (see summary sheet for update)  Subjective: The Pt complains of R-sided back pain LBP. She was previously seen in PT May-2019 and this helped greatly. She did well for awhile, but it has recently become more aggravated in the last few months. She had a lot of family issues come up so she stopped some of her HEP and would like to resume and get a \"referesher\". Her pain is in the R lower back and hip (hip pain is manageable with injections). She denies any radicular pains, numbness, or tingling. 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 She reports that she continues to have a lot of stiffness and tightness throughout the R lower back. Aggravating factors include: walking > 45 min-1hr, modulating speed while walking, carrying/holding heavy objects, sitting > 2 hrs. Relieving factors include: stretching, heat. She is independent with all ADLs. She is working primarily with online work- she is sitting for long periods of time; does not have good posture.       OBJECTIVE/EXAMINATION  Gait and Functional Mobility:  Decreased R knee flexion during swing, trunk lurch forward, Antalgic, decreased R heel strike, decreased R hip extension    Lumbar ROM:   Flexion: Mild   Extension: WFL   Side Bending: Right: NT   Left: NT   Rotation: Right: Mild    Left: Mild    Balance Assessment: Severe deficits in balance and neuromuscular control in single limb stance on R    Squat Assessment:   Double Leg Deviate to the L, forward trunk lean, wide MARY BETH   Single Leg NT    Neurological: Sensations: NT    Flexibility: Mild restrictions in hamstrings, hip internal and external rotators, quadriceps, iliopsoas, and gastrocnemius/soleus complex bilaterally     Right Knee:  AROM 0-90   Left  Knee:  AROM WFL     LOWER QUARTER   MUSCLE STRENGTH  KEY       R  L  0 - No Contraction  Hip flex  4  5  1 - Trace          er    4-  4  2 - Poor          ir   4  4+  3 - Fair           abd  3+  4  4 - Good          ext  3+  4  5 - Normal   Knee flex  4  5               Ext  4  5      Ankle DF  5  5                PF  5  5        Gluteal activation: The Pt has improper gluteal activation with hip extension bilaterally     Joint Mobility Assessment: decreased general lumbar PA glides  Palpation: TTP and increased turgor along R erector spinae     Special Tests:Varus: NT     SLR: (-) B    Valgus: NT     Slump: NT    Zachery's: NT    Keaton: NT    Anterior Drawer: NT    Carlotta [place]: NT    Posterior Drawer: NT    Clonus: NT    Lachman's: NT    With   [] TE   [] TA   [] Neuro   [] SC   [x] other: Patient Education: [x] Review HEP    [] Progressed/Changed HEP based on:   [] positioning   [] body mechanics   [] transfers   [] heat/ice application    [x] other: Pt educated on diagnosis and prognosis with therapy        Other Objective/Functional Measures: FOTO 54/100    Pain Level (0-10 scale) post treatment: 4/10       ASSESSMENT:      [x]  See Plan of Care      Yazan Kapoor PT 1/28/2021

## 2021-02-04 ENCOUNTER — HOSPITAL ENCOUNTER (OUTPATIENT)
Dept: PHYSICAL THERAPY | Age: 46
Discharge: HOME | End: 2021-02-04
Payer: COMMERCIAL

## 2021-02-04 PROCEDURE — 97110 THERAPEUTIC EXERCISES: CPT

## 2021-02-04 PROCEDURE — 20560 NDL INSJ W/O NJX 1 OR 2 MUSC: CPT

## 2021-02-04 NOTE — PROGRESS NOTES
PT DAILY TREATMENT NOTE 2-15    Patient Name: Minesh Buckner  Date:2021  : 1975  [x]  Patient  Verified  Payor: [address] [place] [address] Road / Plan: [address] / Product Type: Managed Care Medicaid /    In time: 11:15 AM  Out time: 12:03 PM  Total Treatment Time (min): 48 minutes  Visit #:  2    Treatment Area: Low back pain [M54.5]  Pain in right hip [M25.551]    SUBJECTIVE  Pain Level (0-10 scale): 5/10  Any medication changes, allergies to medications, adverse drug reactions, diagnosis change, or new procedure performed?: [x] No    [] Yes (see summary sheet for update)  Subjective functional status/changes:   [] No changes reported  The Pt denies any significant changes after her evaluation. The pain is in the same area and a lot of tightness throughout her lower back. OBJECTIVE  *Consent obtained to dry needle B L5 and S1 multifidi and R thoracic and lumbar erector spinae   Modality rationale: decrease pain, increase tissue extensibility and increase muscle contraction/control to improve the patients ability to perform ADLs with less pain or discomfort.    Min Type Additional Details      10 [x] Estim: [x]Att   []Unatt    []TENS instruct                  []IFC  []Premod   []NMES                     [x]Other: Direct current  []w/US   []w/ice   []w/heat  Position:  Prone  Location: B L5 and S1 multifidi and R thoracic and lumbar erector spinae        []  Traction: [] Cervical       []Lumbar                       [] Prone          []Supine                       []Intermittent   []Continuous Lbs:  [] before manual  [] after manual  []w/heat    []  Ultrasound: []Continuous   [] Pulsed                       at: []1MHz   []3MHz Location:  W/cm2:    [] Paraffin         Location:   []w/heat    []  Ice     []  Heat  []  Ice massage Position:  Location:    []  Laser  []  Other: Position:  Location:      []  Vasopneumatic Device Pressure:       [] lo [] med [] hi   Temperature:      [x] Skin assessment post-treatment:  [x]intact []redness- no adverse reaction    []redness  adverse reaction:         33 min Therapeutic Exercise:  [x] See flow sheet :   Rationale: increase ROM, increase strength, improve coordination, improve balance and increase proprioception to improve the patients ability to perform ADLs with less pain or discomfort. 5 min Manual Therapy:  Dry needling to B L5 and S1 multifidi and R thoracic and lumbar erector spinae    Rationale: decrease pain, increase ROM, increase tissue extensibility, decrease trigger points and increase postural awareness  to improve the patients ability to perform ADLs with less pain or discomfort. With   [x] TE   [] TA   [] Neuro   [] SC   [x] other: Patient Education: [x] Review HEP    [] Progressed/Changed HEP based on:   [] positioning   [] body mechanics   [] transfers   [] heat/ice application    [x] other: Pt educated on potential adverse effects and proper after care following dry needling     Other Objective/Functional Measures: None noted     Pain Level (0-10 scale) post treatment: 5/10    ASSESSMENT/Changes in Function:   The Pt tolerated the dry needling well and reported feeling looser throughout the R lower back. After this modality, she did have increased discomfort in the L lower back/superior glute. She was able to perform all of the exercises well, but unable to use a band for clamshells due to significant weakness. Patient will continue to benefit from skilled PT services to modify and progress therapeutic interventions, address functional mobility deficits, address ROM deficits, address strength deficits, analyze and address soft tissue restrictions, analyze and cue movement patterns, analyze and modify body mechanics/ergonomics, assess and modify postural abnormalities and instruct in home and community integration to attain remaining goals.      []  See Plan of Care  []  See progress note/recertification  []  See Discharge Summary         Progress towards goals / Updated goals:  Short Term Goals: To be accomplished in 5 treatments:  1. The Pt will be independent and compliant with their HEP- progressing  2. The Pt will report a 50% reduction in their pain with ADLs- progressing  Long Term Goals: To be accomplished in 10 treatments:  1. The Pt will score the MCII on her FOTO survey demonstrating improved overall function (54 to 57 points)- progressing  2. The Pt will be able to stand/walk >/= 1 hr with 0-3/10 pain to allow the Pt to be able to perform standing ADLs with less pain- progressing  3.  The Pt will be able to hold 20 lbs with 0-3/10 pain to allow the Pt to be able to carry a case of water this less pain- progressing    PLAN  [x]  Upgrade activities as tolerated     [x]  Continue plan of care  [x]  Update interventions per flow sheet       []  Discharge due to:_  []  Other:_      Nancy Roa, PT 2/4/2021

## 2021-02-05 ENCOUNTER — OFFICE VISIT (OUTPATIENT)
Dept: INTERNAL MEDICINE CLINIC | Age: 46
End: 2021-02-05
Payer: COMMERCIAL

## 2021-02-05 VITALS
HEIGHT: 62 IN | HEART RATE: 77 BPM | OXYGEN SATURATION: 99 % | WEIGHT: 141 LBS | BODY MASS INDEX: 25.95 KG/M2 | TEMPERATURE: 96.8 F | SYSTOLIC BLOOD PRESSURE: 115 MMHG | RESPIRATION RATE: 18 BRPM | DIASTOLIC BLOOD PRESSURE: 77 MMHG

## 2021-02-05 DIAGNOSIS — N18.31 STAGE 3A CHRONIC KIDNEY DISEASE (HCC): Primary | ICD-10-CM

## 2021-02-05 DIAGNOSIS — I10 ESSENTIAL HYPERTENSION: ICD-10-CM

## 2021-02-05 DIAGNOSIS — M54.50 CHRONIC BILATERAL LOW BACK PAIN WITHOUT SCIATICA: ICD-10-CM

## 2021-02-05 DIAGNOSIS — E55.9 VITAMIN D DEFICIENCY: ICD-10-CM

## 2021-02-05 DIAGNOSIS — G89.29 CHRONIC BILATERAL LOW BACK PAIN WITHOUT SCIATICA: ICD-10-CM

## 2021-02-05 PROCEDURE — 99213 OFFICE O/P EST LOW 20 MIN: CPT | Performed by: INTERNAL MEDICINE

## 2021-02-05 NOTE — PROGRESS NOTES
Reviewed record in preparation for visit and have obtained necessary documentation. Identified pt with two pt identifiers(name and ). Chief Complaint   Patient presents with    Abnormal Lab Results       Health Maintenance Due   Topic Date Due    COVID-19 Vaccine (1 of 2) 10/01/1992    DTaP/Tdap/Td  (1 - Tdap) 10/01/1997    Pap Test  10/01/1997       Ms. Paula Ponce has a reminder for a \"due or due soon\" health maintenance. I have asked that she discuss this further with her primary care provider for follow-up on this health maintenance. Coordination of Care Questionnaire:  :     1) Have you been to an emergency room, urgent care clinic since your last visit? no   Hospitalized since your last visit? no             2) Have you seen or consulted any other health care providers outside of [address] [place] [address] since your last visit? no  (Include any pap smears or colon screenings in this section.)    3) In the event something were to happen to you and you were unable to speak on your behalf, do you have an Advance Directive/ Living Will in place stating your wishes? NO    Do you have an Advance Directive on file? no    4) Are you interested in receiving information on Advance Directives? NO    Patient is accompanied by self I have received verbal consent from Reji Mena to discuss any/all medical information while they are present in the room.

## 2021-02-11 ENCOUNTER — HOSPITAL ENCOUNTER (OUTPATIENT)
Dept: PHYSICAL THERAPY | Age: 46
Discharge: HOME | End: 2021-02-11
Payer: COMMERCIAL

## 2021-02-11 PROCEDURE — 97110 THERAPEUTIC EXERCISES: CPT

## 2021-02-11 PROCEDURE — 20560 NDL INSJ W/O NJX 1 OR 2 MUSC: CPT

## 2021-02-11 NOTE — PROGRESS NOTES
PT DAILY TREATMENT NOTE 2-15    Patient Name: Ayse Fink  Date:2021  : 1975  [x]  Patient  Verified  [facility] [address] / Plan: [address] / Product Type: Managed Care Medicaid /    In time: 11:15 AM  Out time: 12:10 PM  Total Treatment Time (min): 55 minutes  Visit #:  3    Treatment Area: Low back pain [M54.5]  Pain in right hip [M25.551]    SUBJECTIVE  Pain Level (0-10 scale): 0/10  Any medication changes, allergies to medications, adverse drug reactions, diagnosis change, or new procedure performed?: [x] No    [] Yes (see summary sheet for update)  Subjective functional status/changes:   [] No changes reported  The Pt reports that her hips were sore with the new exercises. She felt looser in the lower back after the dry needling. OBJECTIVE  *Consent obtained to dry needle B L5 and S1 multifidi and R thoracic and lumbar erector spinae   Modality rationale: decrease pain, increase tissue extensibility and increase muscle contraction/control to improve the patients ability to perform ADLs with less pain or discomfort.    Min Type Additional Details      10 [x] Estim: [x]Att   []Unatt    []TENS instruct                  []IFC  []Premod   []NMES                     [x]Other: Direct current []w/US   []w/ice   []w/heat  Position: Prone  Location: B L5 and S1 multifidi and R thoracic and lumbar erector spinae        []  Traction: [] Cervical       []Lumbar                       [] Prone          []Supine                       []Intermittent   []Continuous Lbs:  [] before manual  [] after manual  []w/heat    []  Ultrasound: []Continuous   [] Pulsed                       at: []1MHz   []3MHz Location:  W/cm2:    [] Paraffin         Location:   []w/heat    []  Ice     []  Heat  []  Ice massage Position:  Location:    []  Laser  []  Other: Position:  Location:      []  Vasopneumatic Device Pressure:       [] lo [] med [] hi   Temperature:      [x] Skin assessment post-treatment:  [x]intact []redness- no adverse reaction    []redness  adverse reaction:         42 min Therapeutic Exercise:  [x] See flow sheet :   Rationale: increase ROM, increase strength, improve coordination, improve balance and increase proprioception to improve the patients ability to perform ADLs with less pain or discomfort    3 min Manual Therapy:  Dry needling to B L5 and S1 multifidi and R thoracic and lumbar erector spinae    Rationale: decrease pain, increase ROM, increase tissue extensibility and increase postural awareness  to improve the patients ability to perform ADLs with less pain or discomfort. With   [x] TE   [] TA   [] Neuro   [] SC   [x] other: Patient Education: [x] Review HEP    [] Progressed/Changed HEP based on:   [] positioning   [] body mechanics   [] transfers   [] heat/ice application    [x] other: Pt educated on potential adverse effects and proper after care following dry needling      Other Objective/Functional Measures:   Before Dry Needling: increased turgor and restriction in R lumbar paraspinals     After Dry Needling: reduced turgor and restriction in R lumbar paraspinals    Pain Level (0-10 scale) post treatment: 0/10    ASSESSMENT/Changes in Function:   The Pt tolerated the dry needling well and was able to feel increased R gluteal activation with the newer exercises. Patient will continue to benefit from skilled PT services to modify and progress therapeutic interventions, address functional mobility deficits, address ROM deficits, address strength deficits, analyze and address soft tissue restrictions, analyze and cue movement patterns, analyze and modify body mechanics/ergonomics, assess and modify postural abnormalities and instruct in home and community integration to attain remaining goals.      []  See Plan of Care  []  See progress note/recertification  []  See Discharge Summary         Progress towards goals / Updated goals:  Short Term Goals: To be accomplished in 5 treatments:  1. The Pt will be independent and compliant with their HEP- progressing  2. The Pt will report a 50% reduction in their pain with ADLs- progressing  Long Term Goals: To be accomplished in 10 treatments:  1. The Pt will score the MCII on her FOTO survey demonstrating improved overall function (54 to 57 points)- progressing  2. The Pt will be able to stand/walk >/= 1 hr with 0-3/10 pain to allow the Pt to be able to perform standing ADLs with less pain- progressing  3.  The Pt will be able to hold 20 lbs with 0-3/10 pain to allow the Pt to be able to carry a case of water this less pain- progressing    PLAN  [x]  Upgrade activities as tolerated     [x]  Continue plan of care  [x]  Update interventions per flow sheet       []  Discharge due to:_  []  Other:_      Lisseth Cortez, PT 2/11/2021

## 2021-02-12 LAB
25(OH)D3+25(OH)D2 SERPL-MCNC: 24.1 NG/ML (ref 30–100)
ALBUMIN SERPL-MCNC: 4.4 G/DL (ref 3.8–4.8)
ALBUMIN/GLOB SERPL: 1.6 {RATIO} (ref 1.2–2.2)
ALP SERPL-CCNC: 92 IU/L (ref 39–117)
ALT SERPL-CCNC: 17 IU/L (ref 0–32)
AST SERPL-CCNC: 15 IU/L (ref 0–40)
BASOPHILS # BLD AUTO: 0.1 X10E3/UL (ref 0–0.2)
BASOPHILS NFR BLD AUTO: 0 %
BILIRUB SERPL-MCNC: 0.3 MG/DL (ref 0–1.2)
BUN SERPL-MCNC: 18 MG/DL (ref 6–24)
BUN/CREAT SERPL: 15 (ref 9–23)
CALCIUM SERPL-MCNC: 10.2 MG/DL (ref 8.7–10.2)
CHLORIDE SERPL-SCNC: 103 MMOL/L (ref 96–106)
CO2 SERPL-SCNC: 22 MMOL/L (ref 20–29)
CREAT SERPL-MCNC: 1.17 MG/DL (ref 0.57–1)
EOSINOPHIL # BLD AUTO: 0.1 X10E3/UL (ref 0–0.4)
EOSINOPHIL NFR BLD AUTO: 1 %
ERYTHROCYTE [DISTWIDTH] IN BLOOD BY AUTOMATED COUNT: 17.1 % (ref 11.7–15.4)
GLOBULIN SER CALC-MCNC: 2.7 G/DL (ref 1.5–4.5)
GLUCOSE SERPL-MCNC: 88 MG/DL (ref 65–99)
HCT VFR BLD AUTO: 38.5 % (ref 34–46.6)
HGB BLD-MCNC: 12.2 G/DL (ref 11.1–15.9)
IMM GRANULOCYTES # BLD AUTO: 0 X10E3/UL (ref 0–0.1)
IMM GRANULOCYTES NFR BLD AUTO: 0 %
LYMPHOCYTES # BLD AUTO: 2.7 X10E3/UL (ref 0.7–3.1)
LYMPHOCYTES NFR BLD AUTO: 23 %
MCH RBC QN AUTO: 24.3 PG (ref 26.6–33)
MCHC RBC AUTO-ENTMCNC: 31.7 G/DL (ref 31.5–35.7)
MCV RBC AUTO: 77 FL (ref 79–97)
MONOCYTES # BLD AUTO: 1 X10E3/UL (ref 0.1–0.9)
MONOCYTES NFR BLD AUTO: 8 %
NEUTROPHILS # BLD AUTO: 7.7 X10E3/UL (ref 1.4–7)
NEUTROPHILS NFR BLD AUTO: 68 %
PLATELET # BLD AUTO: 415 X10E3/UL (ref 150–450)
POTASSIUM SERPL-SCNC: 4.6 MMOL/L (ref 3.5–5.2)
PROT SERPL-MCNC: 7.1 G/DL (ref 6–8.5)
RBC # BLD AUTO: 5.02 X10E6/UL (ref 3.77–5.28)
SODIUM SERPL-SCNC: 140 MMOL/L (ref 134–144)
WBC # BLD AUTO: 11.6 X10E3/UL (ref 3.4–10.8)

## 2021-02-12 NOTE — PROGRESS NOTES
Kidney function is stable   Blood count is stable - no anemia / very mild elevation in WBC - will monitor .  Platelets normalized  I am still waiting on records from urologist and kidney specialist   Vitamin D is low recommend that you take 2000 units daily   Message sent on my chart

## 2021-02-18 ENCOUNTER — APPOINTMENT (OUTPATIENT)
Dept: PHYSICAL THERAPY | Age: 46
End: 2021-02-18
Payer: COMMERCIAL

## 2021-02-25 ENCOUNTER — APPOINTMENT (OUTPATIENT)
Dept: PHYSICAL THERAPY | Age: 46
End: 2021-02-25
Payer: COMMERCIAL

## 2021-02-26 ENCOUNTER — TELEPHONE (OUTPATIENT)
Dept: INTERNAL MEDICINE CLINIC | Age: 46
End: 2021-02-26

## 2021-02-26 NOTE — TELEPHONE ENCOUNTER
Patient called in stating we were following up on blood work and we were reaching out to her kidney doctor to obtain results from testing. Patient states a call or mychart message would work est for her for follow up.  Upcoming appt on 5/10 for a VV f/up on HTN

## 2021-03-01 ENCOUNTER — HOSPITAL ENCOUNTER (OUTPATIENT)
Dept: PHYSICAL THERAPY | Age: 46
Discharge: HOME | End: 2021-03-01
Payer: COMMERCIAL

## 2021-03-01 PROCEDURE — 97110 THERAPEUTIC EXERCISES: CPT

## 2021-03-01 NOTE — PROGRESS NOTES
PT DAILY TREATMENT NOTE 2-15    Patient Name: Karen Wren  Date:3/1/2021  : 1975  [x]  Patient  Ver[facility]  [facility] [address] / Plan: [address] / Product Type: Managed Care Medicaid /    In time: 3:32 PM  Out time: 4:25 PM  Total Treatment Time (min): 53 minutes  Visit #:  4    Treatment Area: Low back pain [M54.5]  Pain in right hip [M25.551]    SUBJECTIVE  Pain Level (0-10 scale): 0/10  Any medication changes, allergies to medications, adverse drug reactions, diagnosis change, or new procedure performed?: [x] No    [] Yes (see summary sheet for update)  Subjective functional status/changes:   [] No changes reported  The Pt reports that the dry needling feels good after its done, but she is not as active as she was so she is not reaping the benefits as much     OBJECTIVE    53 min Therapeutic Exercise:  [x] See flow sheet :   Rationale: increase ROM, increase strength, improve coordination, improve balance and increase proprioception to improve the patients ability to perform ADLs with less pain or discomfort. With   [x] TE   [] TA   [] Neuro   [] SC   [] other: Patient Education: [x] Review HEP    [] Progressed/Changed HEP based on:   [] positioning   [] body mechanics   [] transfers   [] heat/ice application    [] other:      Other Objective/Functional Measures: None noted     Pain Level (0-10 scale) post treatment: 6/10 (sore, not pain)    ASSESSMENT/Changes in Function:   Today, decided not to needle to see how she reacted. Focus was shifted to hip and core strengthening and she tolerated this well. She had increased soreness in the R lower back, but no increased pain.   Patient will continue to benefit from skilled PT services to modify and progress therapeutic interventions, address functional mobility deficits, address ROM deficits, address strength deficits, analyze and address soft tissue restrictions, analyze and cue movement patterns, analyze and modify body mechanics/ergonomics, assess and modify postural abnormalities and instruct in home and community integration to attain remaining goals. []  See Plan of Care  []  See progress note/recertification  []  See Discharge Summary         Progress towards goals / Updated goals:  Short Term Goals: To be accomplished in 5 treatments:  1. The Pt will be independent and compliant with their HEP- progressing  2. The Pt will report a 50% reduction in their pain with ADLs- progressing  Long Term Goals: To be accomplished in 10 treatments:  1. The Pt will score the MCII on her FOTO survey demonstrating improved overall function (54 to 57 points)- progressing  2. The Pt will be able to stand/walk >/= 1 hr with 0-3/10 pain to allow the Pt to be able to perform standing ADLs with less pain- progressing  3.  The Pt will be able to hold 20 lbs with 0-3/10 pain to allow the Pt to be able to carry a case of water this less pain- progressing    PLAN  [x]  Upgrade activities as tolerated     [x]  Continue plan of care  [x]  Update interventions per flow sheet       []  Discharge due to:_  []  Other:_      Ruel Pi, PT 3/1/2021

## 2021-03-02 NOTE — TELEPHONE ENCOUNTER
MD Adan West, LPN   Caller: Unspecified (4 days ago,  1:42 PM)             I have not receive the records from renal specialiast or urologist. Can we request those again and let patient know I dont have results.       Records requested again

## 2021-03-04 ENCOUNTER — HOSPITAL ENCOUNTER (OUTPATIENT)
Dept: PHYSICAL THERAPY | Age: 46
Discharge: HOME | End: 2021-03-04
Payer: COMMERCIAL

## 2021-03-04 PROCEDURE — 97110 THERAPEUTIC EXERCISES: CPT

## 2021-03-04 NOTE — PROGRESS NOTES
[facility] [address]. Physical Therapy  [address] [facility]  Phone: [phone] Fax: [phone]    Progress Note    Name: Shawna Opitz   : 1975   MD: Jacob Coffman MD       Treatment Diagnosis: Low back pain [M54.5]  Pain in right hip [M25.551]  Start of Care: 21    Visits from Start of Care: 5  Missed Visits: Cancelled 1    Summary of Care: The Pt has been seen for 5 outpatient physical therapy sessions with chronic R-sided LBP and hip pain. The Pt's therapy program has focused on stretching her lower back and hip musculature, improving her hip strength and stability, improving her core strength and stability, improving her balance and neuromuscular control, reducing the turgor in her lumbar paraspinals and QL, and improving her activity tolerance and endurance via therapeutic exercises and functional dry needling. The Pt had done functional dry needling in the past for the same symptoms, but caused by increased activity. At this time, the Pt's activity level has decreased significantly, so the functional dry needling does help slightly, but is not as effective at this time. She would like to continue with therapy, but focus on strength training to help grow her HEP to help with life-long exercise. She is progressing well with these exercises that are focusing more on hip and core strength. Stretches have been added in for her hip tightness, and she is noticing improved mobility and less restrictions in her hips. Recommend continued PT for an additional 3 sessions to help progress her remaining impairments to allow the Pt to safely return to her PLOF with less pain or risk of further injury. Thank you for this referral.     Progress towards goals / Updated goals:  Short Term Goals: To be accomplished in 5 treatments:  1. The Pt will be independent and compliant with their HEP- met  2.  The Pt will report a 50% reduction in their pain with ADLs- progressing  Long Term Goals: To be accomplished in 10 treatments:  1. The Pt will score the MCII on her FOTO survey demonstrating improved overall function (54 to 57 points)- progressing  2. The Pt will be able to stand/walk >/= 1 hr with 0-3/10 pain to allow the Pt to be able to perform standing ADLs with less pain- progressing  3.  The Pt will be able to hold 20 lbs with 0-3/10 pain to allow the Pt to be able to carry a case of water this less pain- progressing    Wai Young, PT 3/4/2021

## 2021-03-04 NOTE — PROGRESS NOTES
PT DAILY TREATMENT NOTE 2-15    Patient Name: Rosaline Rogers  Date:3/4/2021  : 1975  [x]  Patient  Verified  [facility] [address] / Plan: [address] / Product Type: Managed Care Medicaid /    In time: 8:00 AM  Out time: 8:57 AM  Total Treatment Time (min): 57 minutes  Visit #:  5    Treatment Area: Low back pain [M54.5]  Pain in right hip [M25.551]    SUBJECTIVE  Pain Level (0-10 scale): 0/10  Any medication changes, allergies to medications, adverse drug reactions, diagnosis change, or new procedure performed?: [x] No    [] Yes (see summary sheet for update)  Subjective functional status/changes:   [] No changes reported  The Pt reports that because her activity level is significantly lower than it was during her first round of therapy, she is not benefiting from the dry needling as much. At this time, she wants to pivot away from dry needling and focus on strength training. She would like to learn more exercises so she can continue independently. She has noticed less tightness in her hips with the new stretches. OBJECTIVE    57 min Therapeutic Exercise:  [x] See flow sheet :   Rationale: increase ROM, increase strength, improve coordination, improve balance and increase proprioception to improve the patients ability to perform ADLs with less pain or discomfort.     With   [x] TE   [] TA   [] Neuro   [] SC   [] other: Patient Education: [x] Review HEP    [] Progressed/Changed HEP based on:   [] positioning   [] body mechanics   [] transfers   [] heat/ice application    [] other:      Other Objective/Functional Measures: FOTO 54/100 (54/100 at initial evaluation)     Pain Level (0-10 scale) post treatment: 0/10    ASSESSMENT/Changes in Function:   Patient will continue to benefit from skilled PT services to modify and progress therapeutic interventions, address functional mobility deficits, address ROM deficits, address strength deficits, analyze and address soft tissue restrictions, analyze and cue movement patterns, analyze and modify body mechanics/ergonomics, assess and modify postural abnormalities and instruct in home and community integration to attain remaining goals. []  See Plan of Care  [x]  See progress note/recertification  []  See Discharge Summary         Progress towards goals / Updated goals:  Short Term Goals: To be accomplished in 5 treatments:  1. The Pt will be independent and compliant with their HEP- met  2. The Pt will report a 50% reduction in their pain with ADLs- progressing  Long Term Goals: To be accomplished in 10 treatments:  1. The Pt will score the MCII on her FOTO survey demonstrating improved overall function (54 to 57 points)- progressing  2. The Pt will be able to stand/walk >/= 1 hr with 0-3/10 pain to allow the Pt to be able to perform standing ADLs with less pain- progressing  3.  The Pt will be able to hold 20 lbs with 0-3/10 pain to allow the Pt to be able to carry a case of water this less pain- progressing    PLAN  [x]  Upgrade activities as tolerated     [x]  Continue plan of care  [x]  Update interventions per flow sheet       []  Discharge due to:_  []  Other:_      Sherri Reynolds, PT 3/4/2021

## 2021-03-11 ENCOUNTER — HOSPITAL ENCOUNTER (OUTPATIENT)
Dept: PHYSICAL THERAPY | Age: 46
Discharge: HOME | End: 2021-03-11
Payer: COMMERCIAL

## 2021-03-11 PROCEDURE — 97140 MANUAL THERAPY 1/> REGIONS: CPT

## 2021-03-11 PROCEDURE — 97110 THERAPEUTIC EXERCISES: CPT

## 2021-03-11 NOTE — PROGRESS NOTES
PT DAILY TREATMENT NOTE 2-15    Patient Name: Dunia Cancel  Date:3/11/2021  : 1975  [x]  Patient  Verified  Payor: [address] [place] [address] Road / Plan: [address] / Product Type: Managed Care Medicaid /    In time: 8:00 AM  Out time: 8:54 AM  Total Treatment Time (min): 54 minutes  Visit #:  6    Treatment Area: Low back pain [M54.5]  Pain in right hip [M25.551]    SUBJECTIVE  Pain Level (0-10 scale): 0/10  Any medication changes, allergies to medications, adverse drug reactions, diagnosis change, or new procedure performed?: [x] No    [] Yes (see summary sheet for update)  Subjective functional status/changes:   [] No changes reported  The Pt reports that she started to develop a new sensation that something feels \"out of place\" in the R lower back. She has had it for a long time, but in the last few days it has been more consistent. She is noticing it more when lying on the R side. She also notices it up when she is moving. It is not painful, but it feels like \"something is out of place\" and she is trying to move her body to pop it back into place. OBJECTIVE    41 min Therapeutic Exercise:  [x] See flow sheet :   Rationale: increase ROM, increase strength, improve coordination, improve balance and increase proprioception to improve the patients ability to ambulate and perform ADLs with less pain or discomfort. 13 min Manual Therapy:  Shot gun technique and MET for a L/L forward sacral torsion   Rationale: decrease pain, increase ROM, increase tissue extensibility and increase postural awareness  to improve the patients ability to ambulate and perform ADLs with less pain or discomfort.           With   [x] TE   [] TA   [] Neuro   [] SC   [] other: Patient Education: [x] Review HEP    [] Progressed/Changed HEP based on:   [] positioning   [] body mechanics   [] transfers   [] heat/ice application    [] other:      Other Objective/Functional Measures: None noted     Pain Level (0-10 scale) post treatment: 6/10 Soreness    ASSESSMENT/Changes in Function:   The Pt required mild verbal cuing to correct the form and technique of her newer exercises, but made the corrections well. She was found to have a rotation in her sacrum that corrected well with MET. She had significant R glute activation with the core press and was very sore after this exercise. Patient will continue to benefit from skilled PT services to modify and progress therapeutic interventions, address functional mobility deficits, address ROM deficits, address strength deficits, analyze and address soft tissue restrictions, analyze and cue movement patterns, analyze and modify body mechanics/ergonomics, assess and modify postural abnormalities and instruct in home and community integration to attain remaining goals. []  See Plan of Care  []  See progress note/recertification  []  See Discharge Summary         Progress towards goals / Updated goals:  Short Term Goals: To be accomplished in 5 treatments:  1. The Pt will be independent and compliant with their HEP- met  2. The Pt will report a 50% reduction in their pain with ADLs- progressing  Long Term Goals: To be accomplished in 10 treatments:  1. The Pt will score the MCII on her FOTO survey demonstrating improved overall function (54 to 57 points)- progressing  2. The Pt will be able to stand/walk >/= 1 hr with 0-3/10 pain to allow the Pt to be able to perform standing ADLs with less pain- progressing  3.  The Pt will be able to hold 20 lbs with 0-3/10 pain to allow the Pt to be able to carry a case of water this less pain- progressing    PLAN  [x]  Upgrade activities as tolerated     [x]  Continue plan of care  [x]  Update interventions per flow sheet       []  Discharge due to:_  []  Other:_      Lou Knight, PT 3/11/2021

## 2021-03-18 ENCOUNTER — HOSPITAL ENCOUNTER (OUTPATIENT)
Dept: PHYSICAL THERAPY | Age: 46
Discharge: HOME | End: 2021-03-18
Payer: COMMERCIAL

## 2021-03-18 PROCEDURE — 97110 THERAPEUTIC EXERCISES: CPT

## 2021-03-18 PROCEDURE — 97140 MANUAL THERAPY 1/> REGIONS: CPT

## 2021-03-18 NOTE — PROGRESS NOTES
PT DAILY TREATMENT NOTE 2-15    Patient Name: Jana Del Rosario  Date:3/18/2021  : 1975  [x]  Patient  Verified  Payor: [address] / Plan: [address] / Product Type: Managed Care Medicaid /    In time: 11:00 AM  Out time: 11:52 AM  Total Treatment Time (min): 52 minutes  Visit #:  7    Treatment Area: Low back pain [M54.5]  Pain in right hip [M25.551]    SUBJECTIVE  Pain Level (0-10 scale): 0/10  Any medication changes, allergies to medications, adverse drug reactions, diagnosis change, or new procedure performed?: [x] No    [] Yes (see summary sheet for update)  Subjective functional status/changes:   [] No changes reported  The Pt reports that the alignment correction helped greatly to reduce the nagging discomfort she had been experiencing for many weeks. She reports that since the correction, the nagging discomfort has been significantly less. OBJECTIVE    42 min Therapeutic Exercise:  [x] See flow sheet :   Rationale: increase ROM, increase strength, improve coordination, improve balance and increase proprioception to improve the patients ability to perform ADLs with less pain or discomfort. 10 min Manual Therapy:  Shot gun technique and MET for L/L forward sacral torsion   Rationale: decrease pain, increase ROM, increase tissue extensibility and increase postural awareness  to improve the patients ability to perform ADLs with less pain or discomfort. With   [x] TE   [] TA   [] Neuro   [] SC   [] other: Patient Education: [x] Review HEP    [] Progressed/Changed HEP based on:   [] positioning   [] body mechanics   [] transfers   [] heat/ice application    [] other:      Other Objective/Functional Measures: None noted     Pain Level (0-10 scale) post treatment: 5/10    ASSESSMENT/Changes in Function:   The Pt had significant difficulty performing the sidelying hip abduction without compensation from her TFL.   With the bird dog she had better control with a slight bend in the knee vs completely straightening the LE; will keep with a slightly bended knee and work towards full extension as core stability improves. Patient will continue to benefit from skilled PT services to modify and progress therapeutic interventions, address functional mobility deficits, address ROM deficits, address strength deficits, analyze and address soft tissue restrictions, analyze and cue movement patterns, analyze and modify body mechanics/ergonomics, assess and modify postural abnormalities and instruct in home and community integration to attain remaining goals. []  See Plan of Care  []  See progress note/recertification  []  See Discharge Summary         Progress towards goals / Updated goals:  Short Term Goals: To be accomplished in 5 treatments:  1. The Pt will be independent and compliant with their HEP- met  2. The Pt will report a 50% reduction in their pain with ADLs- progressing  Long Term Goals: To be accomplished in 10 treatments:  1. The Pt will score the MCII on her FOTO survey demonstrating improved overall function (54 to 57 points)- progressing  2. The Pt will be able to stand/walk >/= 1 hr with 0-3/10 pain to allow the Pt to be able to perform standing ADLs with less pain- progressing  3.  The Pt will be able to hold 20 lbs with 0-3/10 pain to allow the Pt to be able to carry a case of water this less pain- progressing    PLAN  [x]  Upgrade activities as tolerated     [x]  Continue plan of care  [x]  Update interventions per flow sheet       []  Discharge due to:_  []  Other:_      Jeff Webster, PT 3/18/2021

## 2021-03-25 ENCOUNTER — APPOINTMENT (OUTPATIENT)
Dept: PHYSICAL THERAPY | Age: 46
End: 2021-03-25
Payer: COMMERCIAL

## 2021-03-25 DIAGNOSIS — I10 ESSENTIAL HYPERTENSION: ICD-10-CM

## 2021-03-25 RX ORDER — LISINOPRIL 10 MG/1
10 TABLET ORAL DAILY
Qty: 30 TAB | Refills: 5 | Status: ON HOLD | OUTPATIENT
Start: 2021-03-25 | End: 2021-09-29 | Stop reason: SDUPTHER

## 2021-03-25 RX ORDER — LISINOPRIL 10 MG/1
TABLET ORAL
Qty: 30 TAB | Refills: 5 | OUTPATIENT
Start: 2021-03-25

## 2021-03-25 NOTE — TELEPHONE ENCOUNTER
Future Appointments:  Future Appointments   Date Time Provider Enedelia Anguiano   4/12/2021  3:30 PM Adrian Burgess PT [facility]   5/10/2021  2:45 PM John Gray MD [facility]        Last Appointment With Me:  2/5/2021     Requested Prescriptions     Pending Prescriptions Disp Refills    lisinopriL (PRINIVIL, ZESTRIL) 10 mg tablet 30 Tab 5

## 2021-04-09 ENCOUNTER — IMMUNIZATION (OUTPATIENT)
Dept: INTERNAL MEDICINE CLINIC | Age: 46
End: 2021-04-09
Payer: COMMERCIAL

## 2021-04-09 DIAGNOSIS — Z23 ENCOUNTER FOR IMMUNIZATION: Primary | ICD-10-CM

## 2021-04-09 PROCEDURE — 91300 COVID-19, MRNA, LNP-S, PF, 30MCG/0.3ML DOSE(PFIZER): CPT | Performed by: FAMILY MEDICINE

## 2021-04-09 PROCEDURE — 0001A COVID-19, MRNA, LNP-S, PF, 30MCG/0.3ML DOSE(PFIZER): CPT | Performed by: FAMILY MEDICINE

## 2021-04-15 ENCOUNTER — TRANSCRIBE ORDER (OUTPATIENT)
Dept: SCHEDULING | Age: 46
End: 2021-04-15

## 2021-04-15 DIAGNOSIS — M25.561 RIGHT KNEE PAIN, UNSPECIFIED CHRONICITY: Primary | ICD-10-CM

## 2021-04-15 DIAGNOSIS — M23.200: ICD-10-CM

## 2021-04-20 ENCOUNTER — HOSPITAL ENCOUNTER (OUTPATIENT)
Dept: PHYSICAL THERAPY | Age: 46
Discharge: HOME | End: 2021-04-20
Payer: COMMERCIAL

## 2021-04-20 PROCEDURE — 97140 MANUAL THERAPY 1/> REGIONS: CPT

## 2021-04-20 PROCEDURE — 97110 THERAPEUTIC EXERCISES: CPT

## 2021-04-20 NOTE — PROGRESS NOTES
PT DAILY TREATMENT NOTE 2-15    Patient Name: Umang Perez  Date:2021  : 1975  [x]  Patient  Verified  Payor: [address] [place] [address] Road / Plan: [address] / Product Type: Managed Care Medicaid /    In time: 3:25 PM  Out time: 4:12 PM  Total Treatment Time (min): 47 minutes  Visit #:  8    Treatment Area: Low back pain [M54.5]  Pain in right hip [M25.551]    SUBJECTIVE  Pain Level (0-10 scale): 6/10  Any medication changes, allergies to medications, adverse drug reactions, diagnosis change, or new procedure performed?: [x] No    [] Yes (see summary sheet for update)  Subjective functional status/changes:   [] No changes reported  The Pt reports that she went to [facility] with her family and is really aggravated her R hip and knee. She had to get a power scooter to get around when her hip and knee were more aggravated. She went to see Dr. Negrita Arroyo for her hip and knee pain when she got home and there was no significant change from her last xrays. She is happy with her HEP and would like to discharge with her exercises to work on independently. OBJECTIVE    39 min Therapeutic Exercise:  [x] See flow sheet : Discussed how to implement her HEP in addition to other exercises to perform at home   Rationale: increase ROM, increase strength, improve coordination, improve balance and increase proprioception to improve the patients ability to ambulate and perform ADLs with less pain or discomfort. 8 min Manual Therapy:  Shot gun technique and MET for a R/R forward sacral torsion   Rationale: decrease pain, increase ROM, increase tissue extensibility and increase postural awareness  to improve the patients ability to ambulate and perform ADLs with less pain or discomfort.     With   [x] TE   [] TA   [] Neuro   [] SC   [x] other: Patient Education: [x] Review HEP    [] Progressed/Changed HEP based on:   [] positioning   [] body mechanics   [] transfers   [] heat/ice application [x] other: Pt educated how to safely progress her HEP independently     Other Objective/Functional Measures:  FOTO 54/100 (54/100 at initial evaluation     Pain Level (0-10 scale) post treatment: 6/10    ASSESSMENT/Changes in Function:     []  See Plan of Care  []  See progress note/recertification  [x]  See Discharge Summary         Progress towards goals / Updated goals:  Short Term Goals: To be accomplished in 5 treatments:  1. The Pt will be independent and compliant with their HEP- met  2. The Pt will report a 50% reduction in their pain with ADLs- progressing  Long Term Goals: To be accomplished in 10 treatments:  1. The Pt will score the MCII on her FOTO survey demonstrating improved overall function (54 to 57 points)- progressing  2. The Pt will be able to stand/walk >/= 1 hr with 0-3/10 pain to allow the Pt to be able to perform standing ADLs with less pain- progressing  3.  The Pt will be able to hold 20 lbs with 0-3/10 pain to allow the Pt to be able to carry a case of water this less pain- progressing    PLAN  []  Upgrade activities as tolerated     []  Continue plan of care  []  Update interventions per flow sheet       [x]  Discharge due to: Pt at plateaued in her progression towards therapeutic goals  []  Other:_      Lynn Hamilton, PT 4/20/2021

## 2021-04-20 NOTE — ANCILLARY DISCHARGE INSTRUCTIONS
[facility] [address] ([facility] [address] [place] Phone: [phone] Fax: [phone] Medicaid Discharge Summary  2-15 Patient name: Maci Harley  : 1975  Provider#: [id number] Referral source: Alexander Garcia MD     
Medical/Treatment Diagnosis: Low back pain [M54.5] Pain in right hip [M25.551] Prior Hospitalization: see medical history Comorbidities: See Plan of Care Prior Level of Function:See Plan of Care Medications: Verified on Patient Summary List 
 
Start of Care: 21      Onset Date: Chronic Visits from Start of Care: 8    Missed Visits: XANDER Herzog Reporting Period : 21 to 21 ASSESSMENT/SUMMARY OF CARE:  The Pt was seen for 8 outpatient physical therapy sessions with chronic LBP. The Pt's therapy program focused on stretching and reducing turgor in her lower back, improving her hip strength and stability, improving her core strength and stability, improving her balance and neuromuscular control, correcting her sacral alignment, and improving her activity tolerance and endurance via therapeutic exercises, manual therapy techniques, pain relieving modalities, and functional dry needling. The Pt's goal was to get a good HEP that she could work on independently to help improve her strength and activity tolerance. She now has many exercises to do at home to help her work on her strength deficits independently. The Pt has not made significant progress towards her therapeutic goals, but it is believed that with time doing her HEP, she will make these progressions independently. The Pt was educated how to safely progress her HEP and voiced understanding. The Pt is discharged from skilled PT and will contact her referring provider with any further questions or concerns.   Thank you for this referral. 
 
Progress towards goals / Updated goals: 
Short Term Goals: To be accomplished in 5 treatments: 
1. The Pt will be independent and compliant with their HEP- met 2. The Pt will report a 50% reduction in their pain with ADLs- progressing Long Term Goals: To be accomplished in 10 treatments: 
1. The Pt will score the MCII on her FOTO survey demonstrating improved overall function (54 to 57 points)- progressing 2. The Pt will be able to stand/walk >/= 1 hr with 0-3/10 pain to allow the Pt to be able to perform standing ADLs with less pain- progressing 3. The Pt will be able to hold 20 lbs with 0-3/10 pain to allow the Pt to be able to carry a case of water this less pain- progressing RECOMMENDATIONS: 
[x]Discontinue therapy: []Patient has reached or is progressing toward set goals []Patient is non-compliant or has abdicated 
    [x]Due to lack of appreciable progress towards set goals- will continue to progress independently []Daylin Pinzon, PT 4/20/2021  
 
 
______________________________________________________________________ NOTE TO PHYSICIAN:  Please complete the following and fax to: [facility]: Fax: [phone]. Retain this original for your records. If you are unable to process this request in 24 hours, please contact our office. [phone] Signature:____________________  Date:____________Time:_________        Milian MD Pietro

## 2021-04-23 ENCOUNTER — HOSPITAL ENCOUNTER (OUTPATIENT)
Dept: MRI IMAGING | Age: 46
Discharge: HOME | End: 2021-04-23
Attending: ORTHOPAEDIC SURGERY
Payer: COMMERCIAL

## 2021-04-23 DIAGNOSIS — M25.561 RIGHT KNEE PAIN, UNSPECIFIED CHRONICITY: ICD-10-CM

## 2021-04-23 DIAGNOSIS — M23.200: ICD-10-CM

## 2021-04-23 PROCEDURE — 73721 MRI JNT OF LWR EXTRE W/O DYE: CPT

## 2021-04-30 ENCOUNTER — IMMUNIZATION (OUTPATIENT)
Dept: INTERNAL MEDICINE CLINIC | Age: 46
End: 2021-04-30
Payer: COMMERCIAL

## 2021-04-30 DIAGNOSIS — Z23 ENCOUNTER FOR IMMUNIZATION: Primary | ICD-10-CM

## 2021-04-30 PROCEDURE — 91300 COVID-19, MRNA, LNP-S, PF, 30MCG/0.3ML DOSE(PFIZER): CPT | Performed by: FAMILY MEDICINE

## 2021-04-30 PROCEDURE — 0002A COVID-19, MRNA, LNP-S, PF, 30MCG/0.3ML DOSE(PFIZER): CPT | Performed by: FAMILY MEDICINE

## 2021-05-10 ENCOUNTER — VIRTUAL VISIT (OUTPATIENT)
Dept: INTERNAL MEDICINE CLINIC | Age: 46
End: 2021-05-10
Payer: COMMERCIAL

## 2021-05-10 DIAGNOSIS — I10 ESSENTIAL HYPERTENSION: Primary | ICD-10-CM

## 2021-05-10 DIAGNOSIS — N18.31 STAGE 3A CHRONIC KIDNEY DISEASE (HCC): ICD-10-CM

## 2021-05-10 DIAGNOSIS — E55.9 VITAMIN D DEFICIENCY: ICD-10-CM

## 2021-05-10 PROCEDURE — 99213 OFFICE O/P EST LOW 20 MIN: CPT | Performed by: INTERNAL MEDICINE

## 2021-05-10 NOTE — PROGRESS NOTES
CC: Hypertension (follow up )      HPI:    She is a 41 y.o. female who presents for evaluation of HTN and kidney disease   In the interval   Patient saw ortho and plans    to have meniscus surgery repair with  Dr Madeline Aquino R knee  Reviewed note  \"a horizontal tear of the lateral meniscus and grade 1 MCL sprain. \"     Interval hx combined with Assessment of Plan   Stage 3a chronic kidney disease  - with abnormal imaging on CT scan of R kidney patient and cystic lesion on R kidney, patient saw both urologsit and renal specialist requested records back in February and never received record  Patient plans to follow up with Dr Loree Patel  In July 2021    Reports blood pressure is controlled  - avoid nephrotoxins  - CBC WITH AUTOMATED DIFF  - METABOLIC PANEL, COMPREHENSIVE    2. Essential hypertension  Well controlled on lisinopril   Denies chest pain or shortness of breath    3. 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 Vitamin D deficiency  Currently taking 1000 units daily      5 Endometrial mass: removed - requesting records/ vaginal bleeding resolved    6. Patient plans to have meniscus surgery repair with  Dr Madeline Aquino        This is an established visit conducted via telemedicine with video. The patient has been instructed that this meets HIPAA criteria and acknowledges and agrees to this method of visitation. Pursuant to the emergency declaration under the [facility] waiver authority and the [employer] Act, this Virtual Visit was conducted, with patient's consent, to reduce the patient's risk of exposure to COVID-19 and provide continuity of care for an established patient. Services were provided through a video synchronous discussion virtually to substitute for in-person clinic visit. ROS:  Constitutional: negative for fevers, chills, anorexia and weight loss  10 systems reviewed and negative other than HPI    Past Medical History:   Diagnosis Date    Club foot     had surgical correction    Hypertension     Pre-diabetes        Current Outpatient Medications on File Prior to Visit   Medication Sig Dispense Refill    lisinopriL (PRINIVIL, ZESTRIL) 10 mg tablet Take 1 Tab by mouth daily. 30 Tab 5     No current facility-administered medications on file prior to visit.         Past Surgical History:   Procedure Laterality Date    HX BREAST REDUCTION      HX OTHER SURGICAL  1980s    right club foot surgery, right hip surgery, right knee surgery    HX SPLENECTOMY      spleen laceration  leading to removal of spleen       Family History   Problem Relation Age of Onset    Diabetes Mother     Hypertension Mother     Coronary Artery Disease Father      Reviewed and no changes     Social History     Socioeconomic History    Marital status: SINGLE     Spouse name: Not on file    Number of children: Not on file    Years of education: Not on file    Highest education level: Not on file   Occupational History    Not on file   Social Needs    Financial resource strain: Not on file    Food insecurity     Worry: Not on file     Inability: Not on file    Transportation needs     Medical: Not on file     Non-medical: Not on file   Tobacco Use    Smoking status: Light Tobacco Smoker     Types: Cigars    Smokeless tobacco: Never Used    Tobacco comment: Rare   Substance and Sexual Activity    Alcohol use: Yes     Frequency: 4 or more times a week     Drinks per session: 1 or 2    Drug use: Never    Sexual activity: Not Currently   Lifestyle    Physical activity     Days per week: Not on file     Minutes per session: Not on file    Stress: Not on file   Relationships    Social connections     Talks on phone: Not on file     Gets together: Not on file     Attends Shinto service: Not on file     Active member of club or organization: Not on file     Attends meetings of clubs or organizations: Not on file     Relationship status: Not on file    Intimate partner violence     Fear of current or ex partner: Not on file     Emotionally abused: Not on file     Physically abused: Not on file     Forced sexual activity: Not on file   Other Topics Concern    Not on file   Social History Narrative    Not on file          There were no vitals taken for this visit. Physical Examination:   Gen: well appearing female  HEENT: normal conjunctiva, no audible congestion, patient does not see oral erythema, has MMM  Neck: patient does not feel enlarged or tender LAD or masses  Resp: normal respiratory effort, no audible wheezing. CV: patient does not feel palpitations or heart irregularity  Abd: patient does not feel abdominal tenderness or mass, patient does not notice distension  Extrem: patient does not see swelling in ankles or joints.    Neuro: Alert and oriented, able to answer questions without difficulty, able to move all extremities and walk normally          Lab Results   Component Value Date/Time    WBC 11.6 (H) 02/11/2021 10:40 AM    HGB 12.2 02/11/2021 10:40 AM    HCT 38.5 02/11/2021 10:40 AM    PLATELET 331 03/27/2627 10:40 AM    MCV 77 (L) 02/11/2021 10:40 AM     Lab Results   Component Value Date/Time    Sodium 140 02/11/2021 10:40 AM    Potassium 4.6 02/11/2021 10:40 AM    Chloride 103 02/11/2021 10:40 AM    CO2 22 02/11/2021 10:40 AM    Anion gap NEG 1 08/31/2020 12:02 AM    Glucose 88 02/11/2021 10:40 AM    BUN 18 02/11/2021 10:40 AM    Creatinine 1.17 (H) 02/11/2021 10:40 AM    BUN/Creatinine ratio 15 02/11/2021 10:40 AM    GFR est AA 65 02/11/2021 10:40 AM    GFR est non-AA 57 (L) 02/11/2021 10:40 AM    Calcium 10.2 02/11/2021 10:40 AM     Lab Results   Component Value Date/Time    Cholesterol, total 199 09/30/2020 12:57 PM    HDL Cholesterol 61 09/30/2020 12:57 PM    LDL, calculated 107 (H) 09/30/2020 12:57 PM    LDL, calculated 139 (H) 03/21/2019 09:09 AM    VLDL, calculated 31 09/30/2020 12:57 PM    VLDL, calculated 29 03/21/2019 09:09 AM    Triglyceride 183 (H) 09/30/2020 12:57 PM     No results found for: TSH, TSH2, TSH3, TSHP, TSHEXT, TSHEXT  No results found for: PSA, PSA2, Joce Beard, [id number], [id number]  Lab Results   Component Value Date/Time    Hemoglobin A1c 5.4 09/30/2020 12:57 PM     Lab Results   Component Value Date/Time    VITAMIN D, 25-HYDROXY 24.1 (L) 02/11/2021 10:40 AM       Lab Results   Component Value Date/Time    ALT (SGPT) 17 02/11/2021 10:40 AM    Alk. phosphatase 92 02/11/2021 10:40 AM    Bilirubin, total 0.3 02/11/2021 10:40 AM           Assessment/Plan:    Stage 3a chronic kidney disease  - with abnormal imaging on CT scan of R kidney patient and cystic lesion on R kidney, patient saw both urologsit and renal specialist requested records back in February and never received record  Patient plans to follow up with Dr Wai Rivas  In July 2021    Reports blood pressure is controlled  - avoid nephrotoxins  - CBC WITH AUTOMATED DIFF  - METABOLIC PANEL, COMPREHENSIVE    2. Essential hypertension  Well controlled on lisinopril   Denies chest pain or shortness of breath    3. 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 Vitamin D deficiency   on vitamin D 1000 units daily we will check vitamin D level      5 Endometrial mass: removed - requesting records/ vaginal bleeding resolved    6. Patient plans to have meniscus surgery repair with  Dr Bon Holguin and Dispositions    · Return in about 6 months (around 11/10/2021). Deena Pedro MD    This is an established visit conducted via real time video and audio telemedicine.   The patient has been instructed that this meets HIPAA criteria and acknowledges and agrees to this method of visitation.

## 2021-06-18 NOTE — PERIOP NOTES
[facility]  Ambulatory Surgery Unit  Pre-operative Instructions    Surgery/Procedure Date  June 24, 2021            Tentative Arrival Time TBD      1. On the day of your surgery/procedure, please report to the Ambulatory Surgery Unit Registration Desk and sign in at your designated time. The Ambulatory Surgery Unit is located in [facility] on the [facility] side of the [facility] across from the [address] [place] [address]. Please have all of your health insurance cards and a photo ID. 2. You must have someone with you to drive you home, as you should not drive a car for 24 hours following anesthesia. Please make arrangements for a responsible adult friend or family member to stay with you for at least the first 24 hours after your surgery. 3. Do not have anything to eat or drink (including water, gum, mints, coffee, juice) after 11:59 PM, June 23rd. This may not apply to medications prescribed by your physician. (Please note below the special instructions with medications to take the morning of surgery, if applicable.)    4. We recommend you do not drink any alcoholic beverages for 24 hours before and after your surgery. 5. Contact your surgeons office for instructions on the following medications: non-steroidal anti-inflammatory drugs (i.e. Advil, Aleve), vitamins, and supplements. (Some surgeons will want you to stop these medications prior to surgery and others may allow you to take them)   **If you are currently taking Plavix, Coumadin, Aspirin and/or other blood-thinning agents, contact your surgeon for instructions. ** Your surgeon will partner with the physician prescribing these medications to determine if it is safe to stop or if you need to continue taking. Please do not stop taking these medications without instructions from your surgeon.     6. In an effort to help prevent surgical site infection, we ask that you shower with an anti-bacterial soap (i.e. Dial/Safeguard, or the soap provided to you at your preadmission testing appointment) for 3 days prior to and on the morning of surgery, using a fresh towel after each shower. (Please begin this process with fresh bed linens.) Do not apply any lotions, powders, or deodorants after the shower on the day of your procedure. If applicable, please do not shave the operative site for 48 hours prior to surgery. 7. Wear comfortable clothes. Wear glasses instead of contacts. Do not bring any jewelry or money (other than copays or fees as instructed). Do not wear make-up, particularly mascara, the morning of your surgery. Do not wear nail polish, particularly if you are having foot /hand surgery. Wear your hair loose or down, no ponytails, buns, naresh pins or clips. All body piercings must be removed. 8. You should understand that if you do not follow these instructions your surgery may be cancelled. If your physical condition changes (i.e. fever, cold or flu) please contact your surgeon as soon as possible. 9. It is important that you be on time. If a situation occurs where you may be late, or if you have any questions or problems, please call [phone].    10. Your surgery time may be subject to change. You will receive a phone call the day prior to surgery to confirm your arrival time. 11. Pediatric patients: please bring a change of clothes, diapers, bottle/sippy cup, pacifier, etc.      Special Instructions:    Bring COVID vaccine card. Take all medications and inhalers, as prescribed, on the morning of surgery with a sip of water. I understand a pre-operative phone call will be made to verify my surgery time. In the event that I am not available, I give permission for a message to be left on my answering service and/or with another person?     yes    Preop instructions reviewed  Pt verbalized understanding.      ___________________      ___________________      ________________  (Signature of Patient) (Witness)                   (Date and Time)

## 2021-06-23 ENCOUNTER — ANESTHESIA EVENT (OUTPATIENT)
Dept: SURGERY | Age: 46
End: 2021-06-23
Payer: COMMERCIAL

## 2021-06-24 ENCOUNTER — HOSPITAL ENCOUNTER (OUTPATIENT)
Age: 46
Setting detail: OUTPATIENT SURGERY
Discharge: HOME | End: 2021-06-24
Attending: ORTHOPAEDIC SURGERY | Admitting: ORTHOPAEDIC SURGERY
Payer: COMMERCIAL

## 2021-06-24 ENCOUNTER — ANESTHESIA (OUTPATIENT)
Dept: SURGERY | Age: 46
End: 2021-06-24
Payer: COMMERCIAL

## 2021-06-24 VITALS
RESPIRATION RATE: 13 BRPM | OXYGEN SATURATION: 100 % | WEIGHT: 143 LBS | DIASTOLIC BLOOD PRESSURE: 76 MMHG | HEIGHT: 62 IN | BODY MASS INDEX: 26.31 KG/M2 | SYSTOLIC BLOOD PRESSURE: 99 MMHG | HEART RATE: 64 BPM | TEMPERATURE: 98.4 F

## 2021-06-24 DIAGNOSIS — M67.51 PLICA SYNDROME OF RIGHT KNEE: Primary | ICD-10-CM

## 2021-06-24 PROCEDURE — 74011250636 HC RX REV CODE- 250/636: Performed by: ORTHOPAEDIC SURGERY

## 2021-06-24 PROCEDURE — 74011250636 HC RX REV CODE- 250/636: Performed by: NURSE ANESTHETIST, CERTIFIED REGISTERED

## 2021-06-24 PROCEDURE — 77030020143 HC AIRWY LARYN INTUB CGAS -A: Performed by: NURSE ANESTHETIST, CERTIFIED REGISTERED

## 2021-06-24 PROCEDURE — 77030006877 HC BLD SHV FLL RAD S&N -B: Performed by: ORTHOPAEDIC SURGERY

## 2021-06-24 PROCEDURE — 74011000250 HC RX REV CODE- 250: Performed by: NURSE ANESTHETIST, CERTIFIED REGISTERED

## 2021-06-24 PROCEDURE — 76060000062 HC AMB SURG ANES 1 TO 1.5 HR: Performed by: ORTHOPAEDIC SURGERY

## 2021-06-24 PROCEDURE — 77030004451 HC BUR SHV S&N -B: Performed by: ORTHOPAEDIC SURGERY

## 2021-06-24 PROCEDURE — 77030018834: Performed by: ORTHOPAEDIC SURGERY

## 2021-06-24 PROCEDURE — 77030000032 HC CUF TRNQT ZIMM -B: Performed by: ORTHOPAEDIC SURGERY

## 2021-06-24 PROCEDURE — 74011000250 HC RX REV CODE- 250: Performed by: ORTHOPAEDIC SURGERY

## 2021-06-24 PROCEDURE — 77030021352 HC CBL LD SYS DISP COVD -B: Performed by: ORTHOPAEDIC SURGERY

## 2021-06-24 PROCEDURE — 77030013079 HC BLNKT BAIR HGGR 3M -A: Performed by: NURSE ANESTHETIST, CERTIFIED REGISTERED

## 2021-06-24 PROCEDURE — 77030019908 HC STETH ESOPH SIMS -A: Performed by: NURSE ANESTHETIST, CERTIFIED REGISTERED

## 2021-06-24 PROCEDURE — 77030008496 HC TBNG ARTHSC IRR S&N -B: Performed by: ORTHOPAEDIC SURGERY

## 2021-06-24 PROCEDURE — 76210000035 HC AMBSU PH I REC 1 TO 1.5 HR: Performed by: ORTHOPAEDIC SURGERY

## 2021-06-24 PROCEDURE — 76030000001 HC AMB SURG OR TIME 1 TO 1.5: Performed by: ORTHOPAEDIC SURGERY

## 2021-06-24 PROCEDURE — 76210000046 HC AMBSU PH II REC FIRST 0.5 HR: Performed by: ORTHOPAEDIC SURGERY

## 2021-06-24 PROCEDURE — 74011250636 HC RX REV CODE- 250/636: Performed by: ANESTHESIOLOGY

## 2021-06-24 PROCEDURE — 2709999900 HC NON-CHARGEABLE SUPPLY: Performed by: ORTHOPAEDIC SURGERY

## 2021-06-24 PROCEDURE — 77030002916 HC SUT ETHLN J&J -A: Performed by: ORTHOPAEDIC SURGERY

## 2021-06-24 RX ORDER — SODIUM CHLORIDE 0.9 % (FLUSH) 0.9 %
5-40 SYRINGE (ML) INJECTION AS NEEDED
Status: DISCONTINUED | OUTPATIENT
Start: 2021-06-24 | End: 2021-06-24 | Stop reason: HOSPADM

## 2021-06-24 RX ORDER — PROPOFOL 10 MG/ML
INJECTION, EMULSION INTRAVENOUS AS NEEDED
Status: DISCONTINUED | OUTPATIENT
Start: 2021-06-24 | End: 2021-06-24 | Stop reason: HOSPADM

## 2021-06-24 RX ORDER — FENTANYL CITRATE 50 UG/ML
25 INJECTION, SOLUTION INTRAMUSCULAR; INTRAVENOUS
Status: DISCONTINUED | OUTPATIENT
Start: 2021-06-24 | End: 2021-06-24 | Stop reason: HOSPADM

## 2021-06-24 RX ORDER — SODIUM CHLORIDE, SODIUM LACTATE, POTASSIUM CHLORIDE, CALCIUM CHLORIDE 600; 310; 30; 20 MG/100ML; MG/100ML; MG/100ML; MG/100ML
25 INJECTION, SOLUTION INTRAVENOUS CONTINUOUS
Status: DISCONTINUED | OUTPATIENT
Start: 2021-06-24 | End: 2021-06-24 | Stop reason: HOSPADM

## 2021-06-24 RX ORDER — FENTANYL CITRATE 50 UG/ML
INJECTION, SOLUTION INTRAMUSCULAR; INTRAVENOUS AS NEEDED
Status: DISCONTINUED | OUTPATIENT
Start: 2021-06-24 | End: 2021-06-24 | Stop reason: HOSPADM

## 2021-06-24 RX ORDER — ROPIVACAINE HYDROCHLORIDE 5 MG/ML
INJECTION, SOLUTION EPIDURAL; INFILTRATION; PERINEURAL AS NEEDED
Status: DISCONTINUED | OUTPATIENT
Start: 2021-06-24 | End: 2021-06-24 | Stop reason: HOSPADM

## 2021-06-24 RX ORDER — ONDANSETRON 2 MG/ML
4 INJECTION INTRAMUSCULAR; INTRAVENOUS AS NEEDED
Status: DISCONTINUED | OUTPATIENT
Start: 2021-06-24 | End: 2021-06-24 | Stop reason: HOSPADM

## 2021-06-24 RX ORDER — GUAIFENESIN 100 MG/5ML
81 LIQUID (ML) ORAL DAILY
Qty: 30 TABLET | Refills: 0 | Status: SHIPPED | OUTPATIENT
Start: 2021-06-24 | End: 2021-09-28

## 2021-06-24 RX ORDER — HYDROCODONE BITARTRATE AND ACETAMINOPHEN 5; 325 MG/1; MG/1
1 TABLET ORAL
Qty: 18 TABLET | Refills: 0 | Status: SHIPPED | OUTPATIENT
Start: 2021-06-24 | End: 2021-06-27

## 2021-06-24 RX ORDER — DEXMEDETOMIDINE HYDROCHLORIDE 100 UG/ML
INJECTION, SOLUTION INTRAVENOUS AS NEEDED
Status: DISCONTINUED | OUTPATIENT
Start: 2021-06-24 | End: 2021-06-24 | Stop reason: HOSPADM

## 2021-06-24 RX ORDER — SODIUM CHLORIDE 0.9 % (FLUSH) 0.9 %
5-40 SYRINGE (ML) INJECTION EVERY 8 HOURS
Status: DISCONTINUED | OUTPATIENT
Start: 2021-06-24 | End: 2021-06-24 | Stop reason: HOSPADM

## 2021-06-24 RX ORDER — LIDOCAINE HYDROCHLORIDE 10 MG/ML
0.1 INJECTION, SOLUTION EPIDURAL; INFILTRATION; INTRACAUDAL; PERINEURAL AS NEEDED
Status: DISCONTINUED | OUTPATIENT
Start: 2021-06-24 | End: 2021-06-24 | Stop reason: HOSPADM

## 2021-06-24 RX ORDER — DEXAMETHASONE SODIUM PHOSPHATE 4 MG/ML
INJECTION, SOLUTION INTRA-ARTICULAR; INTRALESIONAL; INTRAMUSCULAR; INTRAVENOUS; SOFT TISSUE AS NEEDED
Status: DISCONTINUED | OUTPATIENT
Start: 2021-06-24 | End: 2021-06-24 | Stop reason: HOSPADM

## 2021-06-24 RX ORDER — PHENYLEPHRINE HCL IN 0.9% NACL 0.4MG/10ML
SYRINGE (ML) INTRAVENOUS AS NEEDED
Status: DISCONTINUED | OUTPATIENT
Start: 2021-06-24 | End: 2021-06-24 | Stop reason: HOSPADM

## 2021-06-24 RX ORDER — HYDROMORPHONE HYDROCHLORIDE 1 MG/ML
.2-.5 INJECTION, SOLUTION INTRAMUSCULAR; INTRAVENOUS; SUBCUTANEOUS
Status: DISCONTINUED | OUTPATIENT
Start: 2021-06-24 | End: 2021-06-24 | Stop reason: HOSPADM

## 2021-06-24 RX ORDER — MIDAZOLAM HYDROCHLORIDE 1 MG/ML
INJECTION, SOLUTION INTRAMUSCULAR; INTRAVENOUS AS NEEDED
Status: DISCONTINUED | OUTPATIENT
Start: 2021-06-24 | End: 2021-06-24 | Stop reason: HOSPADM

## 2021-06-24 RX ORDER — DIPHENHYDRAMINE HYDROCHLORIDE 50 MG/ML
12.5 INJECTION, SOLUTION INTRAMUSCULAR; INTRAVENOUS AS NEEDED
Status: DISCONTINUED | OUTPATIENT
Start: 2021-06-24 | End: 2021-06-24 | Stop reason: HOSPADM

## 2021-06-24 RX ORDER — ONDANSETRON 2 MG/ML
INJECTION INTRAMUSCULAR; INTRAVENOUS AS NEEDED
Status: DISCONTINUED | OUTPATIENT
Start: 2021-06-24 | End: 2021-06-24 | Stop reason: HOSPADM

## 2021-06-24 RX ORDER — MIDAZOLAM HYDROCHLORIDE 1 MG/ML
INJECTION, SOLUTION INTRAMUSCULAR; INTRAVENOUS
Status: COMPLETED
Start: 2021-06-24 | End: 2021-06-24

## 2021-06-24 RX ORDER — LIDOCAINE HYDROCHLORIDE 20 MG/ML
INJECTION, SOLUTION EPIDURAL; INFILTRATION; INTRACAUDAL; PERINEURAL AS NEEDED
Status: DISCONTINUED | OUTPATIENT
Start: 2021-06-24 | End: 2021-06-24 | Stop reason: HOSPADM

## 2021-06-24 RX ORDER — MORPHINE SULFATE 10 MG/ML
2 INJECTION, SOLUTION INTRAMUSCULAR; INTRAVENOUS
Status: DISCONTINUED | OUTPATIENT
Start: 2021-06-24 | End: 2021-06-24 | Stop reason: HOSPADM

## 2021-06-24 RX ADMIN — ONDANSETRON HYDROCHLORIDE 4 MG: 2 INJECTION, SOLUTION INTRAMUSCULAR; INTRAVENOUS at 13:16

## 2021-06-24 RX ADMIN — PROPOFOL 100 MG: 10 INJECTION, EMULSION INTRAVENOUS at 13:05

## 2021-06-24 RX ADMIN — DEXMEDETOMIDINE HYDROCHLORIDE 4 MCG: 100 INJECTION, SOLUTION, CONCENTRATE INTRAVENOUS at 13:13

## 2021-06-24 RX ADMIN — SODIUM CHLORIDE, POTASSIUM CHLORIDE, SODIUM LACTATE AND CALCIUM CHLORIDE 25 ML/HR: 600; 310; 30; 20 INJECTION, SOLUTION INTRAVENOUS at 11:51

## 2021-06-24 RX ADMIN — WATER 2 G: 1 INJECTION INTRAMUSCULAR; INTRAVENOUS; SUBCUTANEOUS at 13:10

## 2021-06-24 RX ADMIN — MIDAZOLAM HYDROCHLORIDE 2 MG: 1 INJECTION, SOLUTION INTRAMUSCULAR; INTRAVENOUS at 12:53

## 2021-06-24 RX ADMIN — DEXMEDETOMIDINE HYDROCHLORIDE 4 MCG: 100 INJECTION, SOLUTION, CONCENTRATE INTRAVENOUS at 13:53

## 2021-06-24 RX ADMIN — Medication 80 MCG: at 13:38

## 2021-06-24 RX ADMIN — DEXAMETHASONE SODIUM PHOSPHATE 4 MG: 4 INJECTION, SOLUTION INTRAMUSCULAR; INTRAVENOUS at 13:16

## 2021-06-24 RX ADMIN — Medication 80 MCG: at 13:20

## 2021-06-24 RX ADMIN — Medication 80 MCG: at 13:46

## 2021-06-24 RX ADMIN — FENTANYL CITRATE 50 MCG: 50 INJECTION, SOLUTION INTRAMUSCULAR; INTRAVENOUS at 13:01

## 2021-06-24 RX ADMIN — LIDOCAINE HYDROCHLORIDE 100 MG: 20 INJECTION, SOLUTION INTRAVENOUS at 13:01

## 2021-06-24 RX ADMIN — PROPOFOL 200 MG: 10 INJECTION, EMULSION INTRAVENOUS at 13:01

## 2021-06-24 RX ADMIN — FENTANYL CITRATE 50 MCG: 50 INJECTION, SOLUTION INTRAMUSCULAR; INTRAVENOUS at 13:24

## 2021-06-24 NOTE — PERIOP NOTES
Radha Walker  1975  [id number]    Situation:  Verbal report given from: IBIS Angel CRNA, RN  Procedure: Procedure(s):  RIGHT KNEE ARTHROSCOPY, DEBRIDEMEN WITH EXCISION OF MEDIAL PLICA    Background:    Preoperative diagnosis: RIGHT MEDIAL MENISCUS TEAR    Postoperative diagnosis: RIGHT MEDIAL MENISCUS TEAR    :  Dr. Miley Mcfarland    Assistant(s): Circ-1: Erik Johnson RN  Scrub Tech-1: Daniela Fossa    Specimens: * No specimens in log *    Assessment:  Intra-procedure medications         Anesthesia gave intra-procedure sedation and medications, see anesthesia flow sheet     Intravenous fluids: LR@ KVO     Vital signs stable       Recommendation:

## 2021-06-24 NOTE — PERIOP NOTES
Permission received to review discharge instructions and discuss private health information with mother Ayla Becker. Patient states that mother will be with them for at least 24 hours following today's procedure. Warming blanket applied and set to appropriate to temp for patient.

## 2021-06-24 NOTE — ANESTHESIA PREPROCEDURE EVALUATION
Relevant Problems   CARDIOVASCULAR   (+) Hypertension      RENAL FAILURE   (+) CKD stage G3a/A2, GFR 45-59 and albumin creatinine ratio  mg/g (HCC)       Anesthetic History   No history of anesthetic complications            Review of Systems / Medical History  Patient summary reviewed, nursing notes reviewed and pertinent labs reviewed    Pulmonary  Within defined limits                 Neuro/Psych   Within defined limits           Cardiovascular    Hypertension              Exercise tolerance: >4 METS     GI/Hepatic/Renal     GERD: well controlled    Renal disease: CRI       Endo/Other  Within defined limits           Other Findings            Physical Exam    Airway  Mallampati: II  TM Distance: 4 - 6 cm  Neck ROM: normal range of motion   Mouth opening: Normal     Cardiovascular  Regular rate and rhythm,  S1 and S2 normal,  no murmur, click, rub, or gallop             Dental  No notable dental hx       Pulmonary  Breath sounds clear to auscultation               Abdominal  GI exam deferred       Other Findings            Anesthetic Plan    ASA: 2  Anesthesia type: general    Monitoring Plan: BIS      Induction: Intravenous  Anesthetic plan and risks discussed with: Patient

## 2021-06-24 NOTE — PERIOP NOTES
Pt tolerating liquids, vss.  Pt denies any pain. 1501-D/c instructions reviewed with pt's Mom. All questions answered. Reviewed when to call the doctor, diet, activity and hygiene. Reviewed how to use ice. 1524-Transported via w/c to awaiting transportation. No complaints noted at this time.

## 2021-06-24 NOTE — BRIEF OP NOTE
Brief Postoperative Note    Patient: Madeleine Nam  YOB: 1975  MRN: [id number]    Date of Procedure: 6/24/2021     Pre-Op Diagnosis: RIGHT MEDIAL MENISCUS TEAR    Post-Op Diagnosis: right knee plica      Procedure(s):  RIGHT KNEE ARTHROSCOPY, DEBRIDEMEN WITH EXCISION OF MEDIAL PLICA    Surgeon(s):   Giovana Mchugh MD    Surgical Assistant: None    Anesthesia: General     Estimated Blood Loss (mL): Minimal    Complications: None    Specimens: * No specimens in log *     Implants: * No implants in log *    Drains: * No LDAs found *    Findings: as above    Electronically Signed by Kalyani Aponte MD on 6/24/2021 at 2:01 PM

## 2021-06-24 NOTE — DISCHARGE INSTRUCTIONS
Call [phone] for appt in 1 week. You may weight bear as tolerated. Keep dressing clean and dry. You may remove and shower over incisions in 3 days. You may use bandaids daily. Norco 5/325 1-2 every 4 hours as needed for pain. Baby asa 81mg daily for 4 weeks. Knee Arthroscopy: What to Expect at Home  Your Recovery     Arthroscopy is a way to find problems and do surgery inside a joint without making a large cut (incision). Your doctor put a lighted tube with a tiny camera--called an arthroscope, or scope--and surgical tools through small incisions in your knee. You will feel tired for several days. Your knee will be swollen. And you may notice that your skin is a different color near the cuts (incisions). The swelling is normal and will start to go away in a few days. Keeping your leg higher than your heart will help with swelling and pain. You will probably need about 6 weeks to recover. If your doctor repaired damaged tissue, recovery will take longer. You may have to limit your activity until your knee strength and movement are back to normal. You may also be in a physical rehabilitation (rehab) program.  You may be able to return to a desk job or your normal routine in a few days. But if you do physical labor, it may be as long as 2 months before you can go back to work. This care sheet gives you a general idea about how long it will take for you to recover. But each person recovers at a different pace. Follow the steps below to get better as quickly as possible. How can you care for yourself at home? Activity    · Rest when you feel tired. Getting enough sleep will help you recover. Use pillows to raise your ankle and leg above the level of your heart.     · Try to walk each day, after your doctor has said you can. Start by walking a little more than you did the day before. Bit by bit, increase the amount you walk.  Walking boosts blood flow and helps prevent pneumonia and constipation.     · You may have a brace or crutches or both.     · Your doctor will tell you how often and how much you can move your leg and knee.     · If you have a desk job, you may be able to return to work a few days after the surgery. If you lift things or stand or walk a lot at work, it may be as long as 2 months before you can return.     · You can take a shower 48 to 72 hours after surgery and clean the incisions with regular soap and water. Do not take a bath or soak your knee until your doctor says it is okay.     · Ask your doctor when you can drive again.     · If you had a repair of torn tissue, follow your doctor's instructions for lifting things or moving your knee. Diet    · You can eat your normal diet. If your stomach is upset, try bland, low-fat foods like plain rice, broiled chicken, toast, and yogurt.     · Drink plenty of fluids, unless your doctor tells you not to.     · You may notice that your bowel movements are not regular right after your surgery. This is common. Try to avoid constipation and straining with bowel movements. You may want to take a fiber supplement every day. If you have not had a bowel movement after a couple of days, ask your doctor about taking a mild laxative. Medicines    · Your doctor will tell you if and when you can restart your medicines. He or she will also give you instructions about taking any new medicines.     · If you take aspirin or some other blood thinner, ask your doctor if and when to start taking it again. Make sure that you understand exactly what your doctor wants you to do.     · Be safe with medicines. Take pain medicines exactly as directed. ? If the doctor gave you a prescription medicine for pain, take it as prescribed.   ? If you are not taking a prescription pain medicine, ask your doctor if you can take an over-the-counter medicine.     · If you think your pain medicine is making you sick to your stomach:  ? Take your medicine after meals (unless your doctor has told you not to). ? Ask your doctor for a different pain medicine.     · If your doctor prescribed antibiotics, take them as directed. Do not stop taking them just because you feel better. You need to take the full course of antibiotics. Incision care    · If you have a dressing over your cuts (incisions), keep it clean and dry. You may remove it 48 to 72 hours after the surgery.     · If your incisions are open to the air, keep the area clean and dry.     · If you have strips of tape on the incisions, leave the tape on for a week or until it falls off. Exercise    · Move your toes and ankle as much as your bandages will allow.     · Bend and straighten your knee slowly several times during the day.     · Depending on why you had the surgery, you may have to do ankle and leg exercises. Your doctor or physical therapist will give you exercises as part of a rehabilitation program.     · Stop any activity that causes sharp pain. Talk to your doctor or physical therapist about what sports or other exercise you can do. Ice    · To reduce swelling and pain, put ice or a cold pack on your knee for 10 to 20 minutes at a time. Do this every 1 to 2 hours. Put a thin cloth between the ice and your skin. Follow-up care is a key part of your treatment and safety. Be sure to make and go to all appointments, and call your doctor if you are having problems. It's also a good idea to know your test results and keep a list of the medicines you take. When should you call for help? Call 911 anytime you think you may need emergency care. For example, call if:    · You passed out (lost consciousness).     · You have severe trouble breathing.     · You have sudden chest pain and shortness of breath, or you cough up blood.    Call your doctor now or seek immediate medical care if:    · Your foot or toes are numb or tingling.     · Your foot is cool or pale, or it changes color.     · You have signs of a blood clot, such as:  ? Pain in your calf, back of the knee, thigh, or groin. ? Redness and swelling in your leg or groin.     · You are sick to your stomach or cannot keep fluids down.     · You have pain that does not get better after you take pain medicine.     · You have loose stitches, or your incision comes open.     · Bright red blood has soaked through the bandage over your incision.     · You have signs of infection, such as:  ? Increased pain, swelling, warmth, or redness. ? Red streaks leading from the incisions. ? Pus draining from the incisions. ? A fever. Watch closely for any changes in your health, and be sure to contact your doctor if:    · You do not have a bowel movement after taking a laxative. Where can you learn more? Go to http://www.gray.com/  Enter M715 in the search box to learn more about \"Knee Arthroscopy: What to Expect at Home. \"  Current as of: November 16, 2020               Content Version: 12.8  © 2006-2021 [employer]. Care instructions adapted under license by [employer] (which disclaims liability or warranty for this information). If you have questions about a medical condition or this instruction, always ask your healthcare professional. [place] [address] any warranty or liability for your use of this information. TO PREVENT AN INFECTION      1. [address] YOUR HANDS     To prevent infection, good handwashing is the most important thing you or your caregiver can do.  Wash your hands with soap and water or use the hand  we gave you before you touch any wounds. 2. SHOWER     Use the antibacterial soap we gave you when you take a shower.  Shower with this soap until your wounds are healed.  To reach all areas of your body, you may need someone to help you.  Dont forget to clean your belly button with every shower.     3.  USE CLEAN SHEETS     Use freshly cleaned sheets on your bed after surgery.  To keep the surgery site clean, do not allow pets to sleep with you while your wound is still healing. 4. STOP SMOKING     Stop smoking, or at least cut back on smoking     Smoking slows your healing. 5.  CONTROL YOUR BLOOD SUGAR     High blood sugars slow wound healing.  If you are diabetic, control your blood sugar levels before and after your surgery. DO NOT TAKE TYLENOL/ACETAMINOPHEN WITH PERCOCET, [facility], [address] TAKE NARCOTIC PAIN MEDICATIONS WITH FOOD     Narcotics tend to be constipating, we suggest taking a stool softener such as Colace or Miralax (follow package instructions). DO NOT DRIVE WHILE TAKING NARCOTIC PAIN MEDICATIONS. DO NOT TAKE SLEEPING MEDICATIONS OR ANTIANXIETY MEDICATIONS WHILE TAKING NARCOTIC PAIN MEDICATIONS,  ESPECIALLY THE NIGHT OF ANESTHESIA! CPAP PATIENTS BE SURE TO WEAR MACHINE WHENEVER NAPPING OR SLEEPING! DISCHARGE SUMMARY from Nurse    The following personal items collected during your admission are returned to you:   Dental Appliance: Dental Appliances: None  Vision: Visual Aid: Glasses (placed in PACU)  Hearing Aid:    Jewelry: Jewelry: Len Quintana, With patient (patient prefers to leave jewelry in - waiver signed)  Clothing: Clothing: Junior Reggie, With patient  Other Valuables: Other Valuables: Purse (given to family)  Valuables sent to safe:        PATIENT INSTRUCTIONS:    After General Anesthesia or Intravenous Sedation, for 24 hours or while taking prescription Narcotics:        Someone should be with you for the next 24 hours. For your own safety, a responsible adult must drive you home. · Limit your activities  · Recommended activity: Rest today, up with assistance today. Do not climb stairs or shower unattended for the next 24 hours. · Please start with a soft bland diet and advance as tolerated (no nausea) to regular diet.   · If you have a sore throat you should try the following: fluids, warm salt water gargles, or throat lozenges. If it does not improve after several days please follow up with your primary physician. · Do not drive and operate hazardous machinery  · Do not make important personal or business decisions  · Do  not drink alcoholic beverages  · If you have not urinated within 8 hours after discharge, please contact your surgeon on call. Report the following to your surgeon:  · Excessive pain, swelling, redness or odor of or around the surgical area  · Temperature over 100.5  · Nausea and vomiting lasting longer than 4 hours or if unable to take medications  · Any signs of decreased circulation or nerve impairment to extremity: change in color, persistent  numbness, tingling, coldness or increase pain      · You will receive a Post Operative Call from one of the Recovery Room Nurses on the day after your surgery to check on you. It is very important for us to know how you are recovering after your surgery. If you have an issue or need to speak with someone, please call your surgeon, do not wait for the post operative call. · You may receive an e-mail or letter in the mail from [employer] regarding your experience with us in the Ambulatory Surgery Unit. Your feedback is valuable to us and we appreciate your participation in the survey. · If the above instructions are not adequate or you are having problems after your surgery, call the physician at their office number. · We wish you a speedy recovery ? What to do at Home:      *  Please give a list of your current medications to your Primary Care Provider. *  Please update this list whenever your medications are discontinued, doses are      changed, or new medications (including over-the-counter products) are added. *  Please carry medication information at all times in case of emergency situations.     If you have not received your influenza and/or pneumococcal vaccine, please follow up with your primary care physician. The discharge information has been reviewed with the patient and caregiver. The patient and caregiver verbalized understanding. Gael Yuen THROMBOSIS AND PULMONARY EMBOLUS    SURGICAL PATIENTS  Surgical patients are the #1 risk for DVT and PE. WHAT IS DVT? WHAT IS PE?  DVT is a serious condition where blood clots develop deep in the veins of the legs. PE occurs when a blood clot breaks loose from the wall of a vein and travels to the lungs blocking the pulmonary artery or one of its branches impairing blood flow from the heart, which could result in death.   RISK FACTORS   Surgery lasting longer than 45 minutes   History of inflammatory bowel disease   Oral contraceptive or hormone replacement therapy   Immobilization   Varicose veins / swollen legs   Smoking    CHF / Acute MI / Irregular heart beat   Family history of thrombosis   General anesthesia greater than 2 hours   Obesity   Infection of less than one month   Less than 1 month postpartum   COPD / Pneumonia   Arthroscopic surgery   Malignancy / cancer   Spine surgery   Blood abnormalities   Stroke / Paralysis / Coma    SIGNS AND SYMPTOMS OF DEEP VEIN TROMBOSIS   -  ER VISIT  Usually occurs in one leg, above or below the knee   Swelling - one calf or thigh may be larger than the other   Feeling increased warmth in the area of the leg that is swollen or painful   Leg pain, which may increase when standing or walking   Swelling along the vein of the leg   When swollen areas is pressed with a finger, a depression may remain   Tenderness of the leg that may be confined to one area   Change in leg color (bluish or red)    SIGNS AND SYMPTOMS OF PULMONARY EMBOLUS  - 911 CALL   Chest pain that gets worse with deep breath, coughing or chest movement   Coughing up blood   Sweating   Shortness of breath or difficulty breathing   Rapid heart beat   Lightheadedness    HOW TO REDUCE THE POSSIBLE RISK OF DVT   Exercise - simple activities as rotating ankles and wrists, wiggling toes and fingers, tightening and relaxing muscles in calves and thighs promotes circulation while recovering from surgery. Please do these exercises every hour during waking hours   Take mediation as prescribed by your physician (Lovenox, Coumadin, Aspirin)   Resume your normal activities as soon as your doctor advises you to do so.  Remember, when traveling, to [place] your legs frequently.         PATIENTS WHO BELIEVE THEY MAY BE EXPERIENCING SIGNS AND SYMPTOMS OF DVT OR PE SHOULD SEEK MEDICAL HELP IMMEDIATELY

## 2021-06-24 NOTE — ANESTHESIA POSTPROCEDURE EVALUATION
Procedure(s):  RIGHT KNEE ARTHROSCOPY, DEBRIDEMEN WITH EXCISION OF MEDIAL PLICA. general    Anesthesia Post Evaluation        Patient location during evaluation: PACU  Note status: Adequate. Level of consciousness: responsive to verbal stimuli and sleepy but conscious  Pain management: satisfactory to patient  Airway patency: patent  Anesthetic complications: no  Cardiovascular status: acceptable  Respiratory status: acceptable  Hydration status: acceptable  Comments: +Post-Anesthesia Evaluation and Assessment    Patient: Johana Turner MRN: [id number]  SSN: [id number]   YOB: 1975  Age: 41 y.o. Sex: female      Cardiovascular Function/Vital Signs    /82   Pulse 62   Temp 36.9 °C (98.4 °F)   Resp 15   Ht 5' 2\" (1.575 m)   Wt 64.9 kg (143 lb)   SpO2 100%   BMI 26.16 kg/m²     Patient is status post Procedure(s):  RIGHT KNEE ARTHROSCOPY, DEBRIDEMEN WITH EXCISION OF MEDIAL PLICA. Nausea/Vomiting: Controlled. Postoperative hydration reviewed and adequate. Pain:  Pain Scale 1: Numeric (0 - 10) (06/24/21 1416)  Pain Intensity 1: 0 (06/24/21 1416)   Managed. Neurological Status:   Neuro (WDL): Within Defined Limits (06/24/21 1400)   At baseline. Mental Status and Level of Consciousness: Arousable. Pulmonary Status:   O2 Device: Nasal cannula (06/24/21 1400)   Adequate oxygenation and airway patent. Complications related to anesthesia: None    Post-anesthesia assessment completed. No concerns. Signed By: Xiao Zacarias MD    6/24/2021  Post anesthesia nausea and vomiting:  controlled      INITIAL Post-op Vital signs:   Vitals Value Taken Time   /82 06/24/21 1431   Temp 36.9 °C (98.4 °F) 06/24/21 1416   Pulse 63 06/24/21 1445   Resp 14 06/24/21 1445   SpO2 100 % 06/24/21 1445   Vitals shown include unvalidated device data.

## 2021-06-25 NOTE — OP NOTES
[address]  OPERATIVE REPORT    Name:  Abel Moreno  MR#:  [id number]  :  1975  ACCOUNT #:  [de-identified]  DATE OF SERVICE:  2021    PREOPERATIVE DIAGNOSIS:  Right knee meniscal tear. 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 PROCEDURE PERFORMED:  Right knee arthroscopy with debridement and excision of medial plica. SURGEON:  Rashel Stevens MD    ASSISTANT:  None. ANESTHESIA:  General.    COMPLICATIONS:  None. SPECIMENS REMOVED:  None. IMPLANTS:  None. ESTIMATED BLOOD LOSS:  Minimal.    DRAINS:  None. DISPOSITION:  Recovery room, stable. INDICATIONS:  This is a 41-year-old female who failed conservative management for knee pain. MRI demonstrated possible meniscal tear of the lateral meniscus, thickening of the MCL. She had a clunk in her knee mid flexion noted clinically. The risks, benefits and alternatives of arthroscopy were explained in detail and she agreed to proceed. TECHNIQUE:  The patient was taken to the operating room, laid supine on the table. General anesthetic was administered. Ancef was given preoperative per protocol. Tourniquet was applied to the right thigh above the knee and the knee was then prepped and draped free in usual sterile fashion. Time-out was called. Following time-out,  limb was exsanguinated, tourniquet inflated to 250 mmHg. Lateral medial portals were established and the joint was inspected. There was a large medial plica shelf which was debrided back to a stable margin using the sucker shaver blade. 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  The medial meniscus was intact. There was some fraying of the lateral meniscus, but this was likely subclinical.  The snapping was palpated and determined to be secondary to the course of the MCL which was left alone. The fluid was evacuated from the joint.   The portal sites were then closed with interrupted nylon and ropivacaine was injected. Sterile dressings applied followed by an Ace wrap. The patient tolerated the procedure well without immediate complication.       Zarina Jc MD      [id number]  D:  06/24/2021 14:18  T:  06/24/2021 21:38  JOB #:  [id number]

## 2021-09-20 ENCOUNTER — PATIENT MESSAGE (OUTPATIENT)
Dept: INTERNAL MEDICINE CLINIC | Age: 46
End: 2021-09-20

## 2021-09-20 DIAGNOSIS — E83.52 HIGH CALCIUM LEVELS: Primary | ICD-10-CM

## 2021-09-20 DIAGNOSIS — E83.52 SERUM CALCIUM ELEVATED: ICD-10-CM

## 2021-09-20 NOTE — TELEPHONE ENCOUNTER
Paul Gallardo 9/20/2021 2:30 PM EDT      ----- Message -----  From: Lucian Bettencourt  Sent: 9/20/2021 2:29 PM EDT  To: Fatou Valverde  Subject: Test Results Question     Hi Dr BUCKLEY,     I just got a call from my Nephrologist, Dr Benjamin Vasquez today in regards to my routine Kidney function testing. He states that my Kidney functions are stable but my Calcium is rather high and wants to refer me to a Endocrinologist to check my para-thyroid(s). I know I had one of these test done before. .. I think back in 2019 and I believe you sent me there. Should I come to you first and get the test done via . ... and should I be concerned now that this has come up again? I attached the test results    Please advise.

## 2021-09-27 ENCOUNTER — LAB ONLY (OUTPATIENT)
Dept: INTERNAL MEDICINE CLINIC | Age: 46
End: 2021-09-27

## 2021-09-27 DIAGNOSIS — E83.52 HIGH CALCIUM LEVELS: ICD-10-CM

## 2021-09-27 LAB
ANION GAP SERPL CALC-SCNC: 6 MMOL/L (ref 5–15)
BUN SERPL-MCNC: 26 MG/DL (ref 6–20)
BUN/CREAT SERPL: 22 (ref 12–20)
CALCIUM SERPL-MCNC: 11.1 MG/DL (ref 8.5–10.1)
CALCIUM SERPL-MCNC: 11.4 MG/DL (ref 8.5–10.1)
CHLORIDE SERPL-SCNC: 107 MMOL/L (ref 97–108)
CO2 SERPL-SCNC: 24 MMOL/L (ref 21–32)
CREAT SERPL-MCNC: 1.2 MG/DL (ref 0.55–1.02)
GLUCOSE SERPL-MCNC: 94 MG/DL (ref 65–100)
POTASSIUM SERPL-SCNC: 4.7 MMOL/L (ref 3.5–5.1)
PTH-INTACT SERPL-MCNC: 70.3 PG/ML (ref 18.4–88)
SODIUM SERPL-SCNC: 137 MMOL/L (ref 136–145)

## 2021-09-28 ENCOUNTER — APPOINTMENT (OUTPATIENT)
Dept: CT IMAGING | Age: 46
End: 2021-09-28
Attending: EMERGENCY MEDICINE
Payer: COMMERCIAL

## 2021-09-28 ENCOUNTER — HOSPITAL ENCOUNTER (OUTPATIENT)
Age: 46
Setting detail: OBSERVATION
Discharge: HOME | End: 2021-09-30
Attending: EMERGENCY MEDICINE | Admitting: INTERNAL MEDICINE
Payer: COMMERCIAL

## 2021-09-28 DIAGNOSIS — R20.2 FACIAL PARESTHESIA: Primary | ICD-10-CM

## 2021-09-28 DIAGNOSIS — I10 HYPERTENSION, UNSPECIFIED TYPE: ICD-10-CM

## 2021-09-28 DIAGNOSIS — I10 ESSENTIAL HYPERTENSION: ICD-10-CM

## 2021-09-28 DIAGNOSIS — M54.2 NECK PAIN: ICD-10-CM

## 2021-09-28 PROBLEM — G45.9 TIA (TRANSIENT ISCHEMIC ATTACK): Status: ACTIVE | Noted: 2021-09-28

## 2021-09-28 LAB
ALBUMIN SERPL-MCNC: 4.1 G/DL (ref 3.5–5)
ALBUMIN/GLOB SERPL: 1 {RATIO} (ref 1.1–2.2)
ALP SERPL-CCNC: 97 U/L (ref 45–117)
ALT SERPL-CCNC: 31 U/L (ref 12–78)
ANION GAP SERPL CALC-SCNC: 2 MMOL/L (ref 5–15)
AST SERPL-CCNC: 14 U/L (ref 15–37)
BASOPHILS # BLD: 0 K/UL (ref 0–0.1)
BASOPHILS NFR BLD: 0 % (ref 0–1)
BILIRUB SERPL-MCNC: 0.5 MG/DL (ref 0.2–1)
BUN SERPL-MCNC: 28 MG/DL (ref 6–20)
BUN/CREAT SERPL: 21 (ref 12–20)
CA-I SERPL ISE-MCNC: 6 MG/DL (ref 4.5–5.6)
CALCIUM SERPL-MCNC: 10.4 MG/DL (ref 8.5–10.1)
CHLORIDE SERPL-SCNC: 110 MMOL/L (ref 97–108)
CO2 SERPL-SCNC: 27 MMOL/L (ref 21–32)
CREAT SERPL-MCNC: 1.33 MG/DL (ref 0.55–1.02)
D DIMER PPP FEU-MCNC: <0.19 MG/L FEU (ref 0–0.65)
DIFFERENTIAL METHOD BLD: ABNORMAL
EOSINOPHIL # BLD: 0 K/UL (ref 0–0.4)
EOSINOPHIL NFR BLD: 0 % (ref 0–7)
ERYTHROCYTE [DISTWIDTH] IN BLOOD BY AUTOMATED COUNT: 15.2 % (ref 11.5–14.5)
ERYTHROCYTE [SEDIMENTATION RATE] IN BLOOD: 9 MM/HR (ref 0–20)
GLOBULIN SER CALC-MCNC: 4.3 G/DL (ref 2–4)
GLUCOSE BLD STRIP.AUTO-MCNC: 142 MG/DL (ref 65–117)
GLUCOSE SERPL-MCNC: 144 MG/DL (ref 65–100)
HCT VFR BLD AUTO: 42.5 % (ref 35–47)
HGB BLD-MCNC: 13.2 G/DL (ref 11.5–16)
IMM GRANULOCYTES # BLD AUTO: 0.1 K/UL (ref 0–0.04)
IMM GRANULOCYTES NFR BLD AUTO: 1 % (ref 0–0.5)
INR PPP: 1 (ref 0.9–1.1)
LYMPHOCYTES # BLD: 2.7 K/UL (ref 0.8–3.5)
LYMPHOCYTES NFR BLD: 17 % (ref 12–49)
MCH RBC QN AUTO: 26.6 PG (ref 26–34)
MCHC RBC AUTO-ENTMCNC: 31.1 G/DL (ref 30–36.5)
MCV RBC AUTO: 85.7 FL (ref 80–99)
MONOCYTES # BLD: 0.8 K/UL (ref 0–1)
MONOCYTES NFR BLD: 5 % (ref 5–13)
NEUTS SEG # BLD: 12.1 K/UL (ref 1.8–8)
NEUTS SEG NFR BLD: 77 % (ref 32–75)
NRBC # BLD: 0 K/UL (ref 0–0.01)
NRBC BLD-RTO: 0 PER 100 WBC
PLATELET # BLD AUTO: 408 K/UL (ref 150–400)
PMV BLD AUTO: 10.5 FL (ref 8.9–12.9)
POTASSIUM SERPL-SCNC: 4.1 MMOL/L (ref 3.5–5.1)
PROT SERPL-MCNC: 8.4 G/DL (ref 6.4–8.2)
PROTHROMBIN TIME: 10.4 SEC (ref 9–11.1)
RBC # BLD AUTO: 4.96 M/UL (ref 3.8–5.2)
SERVICE CMNT-IMP: ABNORMAL
SODIUM SERPL-SCNC: 139 MMOL/L (ref 136–145)
TROPONIN I SERPL-MCNC: <0.05 NG/ML
WBC # BLD AUTO: 15.7 K/UL (ref 3.6–11)

## 2021-09-28 PROCEDURE — 84484 ASSAY OF TROPONIN QUANT: CPT

## 2021-09-28 PROCEDURE — 99218 HC RM OBSERVATION: CPT

## 2021-09-28 PROCEDURE — 99285 EMERGENCY DEPT VISIT HI MDM: CPT

## 2021-09-28 PROCEDURE — 82962 GLUCOSE BLOOD TEST: CPT

## 2021-09-28 PROCEDURE — 86141 C-REACTIVE PROTEIN HS: CPT

## 2021-09-28 PROCEDURE — 74011000636 HC RX REV CODE- 636: Performed by: EMERGENCY MEDICINE

## 2021-09-28 PROCEDURE — 96372 THER/PROPH/DIAG INJ SC/IM: CPT

## 2021-09-28 PROCEDURE — 80053 COMPREHEN METABOLIC PANEL: CPT

## 2021-09-28 PROCEDURE — 85610 PROTHROMBIN TIME: CPT

## 2021-09-28 PROCEDURE — 70450 CT HEAD/BRAIN W/O DYE: CPT

## 2021-09-28 PROCEDURE — 0042T CT CODE NEURO PERF W CBF: CPT

## 2021-09-28 PROCEDURE — 85025 COMPLETE CBC W/AUTO DIFF WBC: CPT

## 2021-09-28 PROCEDURE — 93005 ELECTROCARDIOGRAM TRACING: CPT

## 2021-09-28 PROCEDURE — 85652 RBC SED RATE AUTOMATED: CPT

## 2021-09-28 PROCEDURE — 70498 CT ANGIOGRAPHY NECK: CPT

## 2021-09-28 PROCEDURE — 85379 FIBRIN DEGRADATION QUANT: CPT

## 2021-09-28 PROCEDURE — 74011250637 HC RX REV CODE- 250/637: Performed by: EMERGENCY MEDICINE

## 2021-09-28 PROCEDURE — 36415 COLL VENOUS BLD VENIPUNCTURE: CPT

## 2021-09-28 PROCEDURE — 74011250636 HC RX REV CODE- 250/636: Performed by: INTERNAL MEDICINE

## 2021-09-28 RX ORDER — ENOXAPARIN SODIUM 100 MG/ML
40 INJECTION SUBCUTANEOUS EVERY 24 HOURS
Status: DISCONTINUED | OUTPATIENT
Start: 2021-09-28 | End: 2021-09-30 | Stop reason: HOSPADM

## 2021-09-28 RX ORDER — POLYETHYLENE GLYCOL 3350 17 G/17G
17 POWDER, FOR SOLUTION ORAL DAILY PRN
Status: DISCONTINUED | OUTPATIENT
Start: 2021-09-28 | End: 2021-09-30 | Stop reason: HOSPADM

## 2021-09-28 RX ORDER — ACETAMINOPHEN 325 MG/1
650 TABLET ORAL
Status: DISCONTINUED | OUTPATIENT
Start: 2021-09-28 | End: 2021-09-30 | Stop reason: HOSPADM

## 2021-09-28 RX ORDER — GUAIFENESIN 100 MG/5ML
325 LIQUID (ML) ORAL
Status: COMPLETED | OUTPATIENT
Start: 2021-09-28 | End: 2021-09-28

## 2021-09-28 RX ORDER — LABETALOL HYDROCHLORIDE 5 MG/ML
5 INJECTION, SOLUTION INTRAVENOUS
Status: DISCONTINUED | OUTPATIENT
Start: 2021-09-28 | End: 2021-09-30 | Stop reason: HOSPADM

## 2021-09-28 RX ORDER — ONDANSETRON 2 MG/ML
4 INJECTION INTRAMUSCULAR; INTRAVENOUS
Status: DISCONTINUED | OUTPATIENT
Start: 2021-09-28 | End: 2021-09-30 | Stop reason: HOSPADM

## 2021-09-28 RX ORDER — GUAIFENESIN 100 MG/5ML
81 LIQUID (ML) ORAL DAILY
Status: DISCONTINUED | OUTPATIENT
Start: 2021-09-29 | End: 2021-09-30 | Stop reason: HOSPADM

## 2021-09-28 RX ADMIN — IOPAMIDOL 100 ML: 755 INJECTION, SOLUTION INTRAVENOUS at 20:07

## 2021-09-28 RX ADMIN — ENOXAPARIN SODIUM 40 MG: 40 INJECTION SUBCUTANEOUS at 22:29

## 2021-09-28 RX ADMIN — ASPIRIN 324 MG: 81 TABLET, CHEWABLE ORAL at 20:47

## 2021-09-28 NOTE — ED NOTES
Assumed care of this pt in CT from triage. Pt in CT at [address] [place] [address]. Called as a Level 2 stroke per ED MD.     Pt presents to the ED or L sided facial numbness and tingling since 3 pm today. Pt stated the left side of her lips didn't feel right. Pt states throbbing on the left side of her eye. Pt hx of bells palsy. 2013: Tele-Neuro at bedside.

## 2021-09-29 ENCOUNTER — APPOINTMENT (OUTPATIENT)
Dept: NON INVASIVE DIAGNOSTICS | Age: 46
End: 2021-09-29
Attending: INTERNAL MEDICINE
Payer: COMMERCIAL

## 2021-09-29 ENCOUNTER — APPOINTMENT (OUTPATIENT)
Dept: MRI IMAGING | Age: 46
End: 2021-09-29
Attending: INTERNAL MEDICINE
Payer: COMMERCIAL

## 2021-09-29 VITALS
HEIGHT: 62 IN | OXYGEN SATURATION: 98 % | TEMPERATURE: 98.7 F | BODY MASS INDEX: 25.58 KG/M2 | HEART RATE: 71 BPM | RESPIRATION RATE: 20 BRPM | WEIGHT: 139 LBS | SYSTOLIC BLOOD PRESSURE: 127 MMHG | DIASTOLIC BLOOD PRESSURE: 88 MMHG

## 2021-09-29 LAB
ANION GAP SERPL CALC-SCNC: 4 MMOL/L (ref 5–15)
ATRIAL RATE: 61 BPM
BUN SERPL-MCNC: 22 MG/DL (ref 6–20)
BUN/CREAT SERPL: 20 (ref 12–20)
CALCIUM SERPL-MCNC: 10.6 MG/DL (ref 8.5–10.1)
CALCULATED P AXIS, ECG09: 55 DEGREES
CALCULATED R AXIS, ECG10: 31 DEGREES
CALCULATED T AXIS, ECG11: 32 DEGREES
CHLORIDE SERPL-SCNC: 112 MMOL/L (ref 97–108)
CO2 SERPL-SCNC: 22 MMOL/L (ref 21–32)
CREAT SERPL-MCNC: 1.08 MG/DL (ref 0.55–1.02)
CRP SERPL HS-MCNC: 2.7 MG/L
DIAGNOSIS, 93000: NORMAL
ECHO AO ASC DIAM: 2.56 CM
ECHO AO ROOT DIAM: 2.4 CM
ECHO AV AREA PEAK VELOCITY: 1.96 CM2
ECHO AV AREA/BSA PEAK VELOCITY: 1.2 CM2/M2
ECHO AV PEAK GRADIENT: 5.67 MMHG
ECHO AV PEAK VELOCITY: 119.05 CM/S
ECHO IVC PROX: 1.57 CM
ECHO LA MAJOR AXIS: 2.41 CM
ECHO LA MINOR AXIS: 1.47 CM
ECHO LV INTERNAL DIMENSION DIASTOLIC: 3.9 CM (ref 3.9–5.3)
ECHO LV INTERNAL DIMENSION SYSTOLIC: 3.32 CM
ECHO LV IVSD: 1.09 CM (ref 0.6–0.9)
ECHO LV MASS 2D: 121.3 G (ref 67–162)
ECHO LV MASS INDEX 2D: 73.9 G/M2 (ref 43–95)
ECHO LV POSTERIOR WALL DIASTOLIC: 0.9 CM (ref 0.6–0.9)
ECHO LVOT DIAM: 1.87 CM
ECHO LVOT PEAK GRADIENT: 2.91 MMHG
ECHO LVOT PEAK VELOCITY: 85.26 CM/S
ECHO MV A VELOCITY: 52.53 CM/S
ECHO MV AREA PHT: 1.66 CM2
ECHO MV E DECELERATION TIME (DT): 185.6 MS
ECHO MV E VELOCITY: 67.08 CM/S
ECHO MV E/A RATIO: 1.28
ECHO MV MAX VELOCITY: 72.94 CM/S
ECHO MV MEAN GRADIENT: 0.73 MMHG
ECHO MV PEAK GRADIENT: 2.13 MMHG
ECHO MV PRESSURE HALF TIME (PHT): 132.21 MS
ECHO MV VTI: 23.79 CM
ECHO PV MAX VELOCITY: 95.05 CM/S
ECHO PV PEAK INSTANTANEOUS GRADIENT SYSTOLIC: 3.62 MMHG
ECHO RVOT PEAK VELOCITY: 80.23 CM/S
ECHO RVOT VTI: 18.23 CM
ECHO TV MEAN GRADIENT: 1.28 MMHG
ERYTHROCYTE [DISTWIDTH] IN BLOOD BY AUTOMATED COUNT: 15.3 % (ref 11.5–14.5)
EST. AVERAGE GLUCOSE BLD GHB EST-MCNC: 123 MG/DL
GLUCOSE SERPL-MCNC: 108 MG/DL (ref 65–100)
HBA1C MFR BLD: 5.9 % (ref 4–5.6)
HCT VFR BLD AUTO: 41.6 % (ref 35–47)
HGB BLD-MCNC: 13 G/DL (ref 11.5–16)
MCH RBC QN AUTO: 26.6 PG (ref 26–34)
MCHC RBC AUTO-ENTMCNC: 31.3 G/DL (ref 30–36.5)
MCV RBC AUTO: 85.2 FL (ref 80–99)
NRBC # BLD: 0 K/UL (ref 0–0.01)
NRBC BLD-RTO: 0 PER 100 WBC
P-R INTERVAL, ECG05: 158 MS
PLATELET # BLD AUTO: 393 K/UL (ref 150–400)
PMV BLD AUTO: 10.6 FL (ref 8.9–12.9)
POTASSIUM SERPL-SCNC: 4.6 MMOL/L (ref 3.5–5.1)
PROCALCITONIN SERPL-MCNC: <0.05 NG/ML
Q-T INTERVAL, ECG07: 388 MS
QRS DURATION, ECG06: 80 MS
QTC CALCULATION (BEZET), ECG08: 390 MS
RBC # BLD AUTO: 4.88 M/UL (ref 3.8–5.2)
SODIUM SERPL-SCNC: 138 MMOL/L (ref 136–145)
TSH SERPL DL<=0.05 MIU/L-ACNC: 1.03 UIU/ML (ref 0.36–3.74)
VENTRICULAR RATE, ECG03: 61 BPM
WBC # BLD AUTO: 13.2 K/UL (ref 3.6–11)

## 2021-09-29 PROCEDURE — 80048 BASIC METABOLIC PNL TOTAL CA: CPT

## 2021-09-29 PROCEDURE — 84443 ASSAY THYROID STIM HORMONE: CPT

## 2021-09-29 PROCEDURE — 85027 COMPLETE CBC AUTOMATED: CPT

## 2021-09-29 PROCEDURE — 97535 SELF CARE MNGMENT TRAINING: CPT

## 2021-09-29 PROCEDURE — 99205 OFFICE O/P NEW HI 60 MIN: CPT | Performed by: PSYCHIATRY & NEUROLOGY

## 2021-09-29 PROCEDURE — 70551 MRI BRAIN STEM W/O DYE: CPT

## 2021-09-29 PROCEDURE — 97165 OT EVAL LOW COMPLEX 30 MIN: CPT

## 2021-09-29 PROCEDURE — 83036 HEMOGLOBIN GLYCOSYLATED A1C: CPT

## 2021-09-29 PROCEDURE — 74011250637 HC RX REV CODE- 250/637: Performed by: INTERNAL MEDICINE

## 2021-09-29 PROCEDURE — 84145 PROCALCITONIN (PCT): CPT

## 2021-09-29 PROCEDURE — 36415 COLL VENOUS BLD VENIPUNCTURE: CPT

## 2021-09-29 PROCEDURE — 99218 HC RM OBSERVATION: CPT

## 2021-09-29 PROCEDURE — 96374 THER/PROPH/DIAG INJ IV PUSH: CPT

## 2021-09-29 RX ORDER — PREDNISONE 10 MG/1
TABLET ORAL
Qty: 21 TABLET | Refills: 0 | Status: SHIPPED | OUTPATIENT
Start: 2021-09-29 | End: 2021-12-03

## 2021-09-29 RX ORDER — LISINOPRIL 10 MG/1
10 TABLET ORAL DAILY
Qty: 30 TABLET | Refills: 5 | Status: SHIPPED | OUTPATIENT
Start: 2021-10-05 | End: 2022-07-28 | Stop reason: SDUPTHER

## 2021-09-29 RX ORDER — ATORVASTATIN CALCIUM 40 MG/1
40 TABLET, FILM COATED ORAL
Qty: 30 TABLET | Refills: 0 | Status: SHIPPED | OUTPATIENT
Start: 2021-09-29 | End: 2021-10-05 | Stop reason: ALTCHOICE

## 2021-09-29 RX ORDER — GUAIFENESIN 100 MG/5ML
81 LIQUID (ML) ORAL DAILY
Qty: 30 TABLET | Refills: 0 | Status: SHIPPED | OUTPATIENT
Start: 2021-09-30 | End: 2021-10-30

## 2021-09-29 RX ORDER — ATORVASTATIN CALCIUM 40 MG/1
40 TABLET, FILM COATED ORAL
Status: DISCONTINUED | OUTPATIENT
Start: 2021-09-29 | End: 2021-09-30 | Stop reason: HOSPADM

## 2021-09-29 RX ADMIN — ASPIRIN 81 MG: 81 TABLET, CHEWABLE ORAL at 09:40

## 2021-09-29 NOTE — CONSULTS
Neurology Note    Patient ID:  Guanaco Carter  [id number]  50 y.o.  1975      Date of Consultation:  September 29, 2021    Referring Physician: Dr. Radha Alvarez    Reason for Consultation:  Left facial numbness      Assessment and Plan:    The patient is a pleasant 41year old female with history of prior bell's palsy and distal orthopedic injuries who presents with transient neck/facial pain and sensory disturbance    Transient neck/facial pain and sensory disturbance:  Improved, but mild pain persists  Differential includes: tia, stroke with improvement in symptoms, musculoskeletal/arthritic in origin. Risk factor for tia/stroke:  Htn, rare smoking  Brain mri pending for this am.  Head ct and cta were unremarkable. Continue asa daily. Lipid panel is pending - if ldl > 100 start statin therapy  hgba1c is pending:  Htn: goal sbp is under 140. Echo pending      If mri is negative, I would recommend a medrol dose pack to help with any inflammation/muscle spasm. Prior bell's palsy:   Mild muscle weakness persists. Unchanged. Swallowing/speech are stable        Neurology will continue to follow along         Subjective: my left side of face and neck felt different     History of Present Illness:   Guanaco Carter is a 41 y.o. female with a history of hypertension and Bell's palsy who presents with acute onset of left facial numbness and pain. She does have a underlying history of Bell's palsy with residual facial asymmetry. she presented to the emergency department on September 28, 2021 due to worsening left-sided facial numbness along with pain radiating into her neck. The severity of the symptoms lasted for a short period of time, but she does still have tightness in her jaw and side of her neck. Speech and swallowing are stable. No change in her vision. She was sitting at her desk when began. No unusual physical activity. No trauma.      Past Medical History:   Diagnosis Date    Club foot     had surgical correction    GERD (gastroesophageal reflux disease)     \"mild\"    Hypertension     Pre-diabetes         Past Surgical History:   Procedure Laterality Date    HX BREAST REDUCTION  2017    HX DILATION AND CURETTAGE  10/2020    HX OTHER SURGICAL  1980s    right club foot surgery, right hip surgery, right knee surgery    HX SPLENECTOMY  1994    spleen laceration  leading to removal of spleen        Family History   Problem Relation Age of Onset    Diabetes Mother     Hypertension Mother     Coronary Artery Disease Father         Social History     Tobacco Use    Smoking status: Light Tobacco Smoker     Types: Cigars    Smokeless tobacco: Never Used    Tobacco comment: cigar on holidays   Substance Use Topics    Alcohol use: Yes     Alcohol/week: 3.0 - 4.0 standard drinks     Types: 3 - 4 Glasses of wine per week        No Known Allergies     Prior to Admission medications    Medication Sig Start Date End Date Taking? Authorizing Provider   lisinopriL (PRINIVIL, ZESTRIL) 10 mg tablet Take 1 Tab by mouth daily.  3/25/21  Yes John Bergeron MD     Current Facility-Administered Medications   Medication Dose Route Frequency    acetaminophen (TYLENOL) tablet 650 mg  650 mg Oral Q6H PRN    ondansetron (ZOFRAN) injection 4 mg  4 mg IntraVENous Q4H PRN    aspirin chewable tablet 81 mg  81 mg Oral DAILY    labetaloL (NORMODYNE;TRANDATE) injection 5 mg  5 mg IntraVENous Q10MIN PRN    polyethylene glycol (MIRALAX) packet 17 g  17 g Oral DAILY PRN    enoxaparin (LOVENOX) injection 40 mg  40 mg SubCUTAneous Q24H       Review of Systems:    General, constitutional: negative  Eyes, vision: negative  Ears, nose, throat: negative  Cardiovascular, heart: negative  Respiratory: negative  Gastrointestinal: negative  Genitourinary: negative  Musculoskeletal: negative  Skin and integumentary: negative  Psychiatric: negative  Endocrine: negative  Neurological: negative, except for HPI  Hematologic/lymphatic: negative  Allergy/immunology: negative    Objective:     Visit Vitals  /77   Pulse 60   Temp 98.7 °F (37.1 °C)   Resp 18   Ht 5' 2\" (1.575 m)   Wt 138 lb 10.7 oz (62.9 kg)   SpO2 100%   BMI 25.36 kg/m²       Physical Exam:      General:  appears well nourished in no acute distress  Neck: no carotid bruits  Lungs: clear to auscultation  Heart:  no murmurs, regular rate  Lower extremity: peripheral pulses palpable and no edema  Skin: intact. Scars noted from prior surgeries on the right lower extremity    Neurological exam:    Awake, alert, oriented to person, place and time  Recent and remote memory were normal  Attention and concentration were intact  Language was intact. There was no aphasia  Speech: no dysarthria  Fund of knowledge was preserved    Cranial nerves:   II-XII were tested    Perrrla  Fundoscopic examination revealed venous pulsations and no clear abnormalities  Visual fields were full  Eomi, no evidence of nystagmus  Facial sensation:  normal and symmetric  Facial motor: facial asymmetry. Decreased rise of left NLF  Hearing intact  SCM strength intact  Tongue: midline without fasciculations    Motor: Tone normal  Full neck range of motion. No cervical spinal tenderness. No evidence of fasciculations    Strength testing:   deltoid triceps biceps Wrist ext. Wrist flex. intrinsics Hip flex. Hip ext. Knee ext. Knee flex Dorsi flex Plantar flex   Right 5 5 5 5 5 5 5 5 5 5 5 5   Left 5 5 5 5 5 5 5 5 5 5 5 5     Right foot deformity    Sensory:  Upper extremity: intact to pp and  vibration  Lower extremity: intact to pp and vibration    Reflexes:    Right Left  Biceps  2 2  Triceps 2 2  Brachiorad. 2 2  Patella  2 2  Achilles 1 2    Cerebellar testing:  no tremor apparent, finger/nose and blaine were intact    Romberg: absent    Gait: steady.  Antalgic due to foot deformity    Labs:     Lab Results   Component Value Date/Time    Hemoglobin A1c 5.4 09/30/2020 12:57 PM    Sodium 138 09/29/2021 05:04 AM    Potassium 4.6 09/29/2021 05:04 AM    Chloride 112 (H) 09/29/2021 05:04 AM    Glucose 108 (H) 09/29/2021 05:04 AM    BUN 22 (H) 09/29/2021 05:04 AM    Creatinine 1.08 (H) 09/29/2021 05:04 AM    Calcium 10.6 (H) 09/29/2021 05:04 AM    WBC 13.2 (H) 09/29/2021 05:04 AM    HCT 41.6 09/29/2021 05:04 AM    HGB 13.0 09/29/2021 05:04 AM    PLATELET 711 95/51/9378 05:04 AM       Imaging:    Results from Hospital Encounter encounter on 04/23/21    MRI KNEE RT WO CONT    Narrative  EXAM: MRI KNEE RT WO CONT    INDICATION: Right knee pain    COMPARISON: None    TECHNIQUE: Axial T2 fat-saturated and proton density fat-saturated; coronal T1  and proton density fat-saturated; and sagittal T2 fat-saturated, proton density  fat-saturated, and gradient echo MRI of the right knee . CONTRAST: None. FINDINGS: Bone marrow: Within normal limits. No acute fracture, dislocation, or  marrow replacing process. Joint fluid: None. Collateral ligaments and posterior, lateral corner: Mild edema is seen along the  course of the MCL. The fibers are intact. The LCL and posterior lateral corner  are intact. Medial meniscus: Intact. Lateral meniscus: There is degenerative flattening of the anterior horn. There  is a horizontal tear in the body. Small para meniscal cysts are seen adjacent to  the body measuring up to 8 mm in size. ACL and PCL: Intact. Tendons: Intact. There is significant thickening of the iliotibial band    Muscles: Within normal limits. Patellofemoral alignment: There is lateral tilting and subluxation of the  patella. The femoral trochlea is hypoplastic. TT-TG distance: 12 mm    Articular cartilage: Intact. No focal osteochondral lesion. Soft tissue mass: None. Impression  1. Horizontal tearing in the body of the lateral meniscus with small adjacent  para meniscal cysts. Degenerative flattening is seen in the anterior horn as  well. 2. Grade 1 MCL sprain.   3. Significant thickening of the iliotibial band. Results from [place] encounter on 09/28/21    CT CODE NEURO HEAD WO CONTRAST    Narrative  EXAM: CT CODE NEURO HEAD WO CONTRAST    INDICATION: transient L sided facial numbness    COMPARISON: None. CONTRAST: None. TECHNIQUE: Unenhanced CT of the head was performed using 5 mm images. Brain and  bone windows were generated. Coronal and sagittal reformats. CT dose reduction  was achieved through use of a standardized protocol tailored for this  examination and automatic exposure control for dose modulation. FINDINGS:  The ventricles and sulci are normal in size, shape and configuration. . There is  no significant white matter disease. There is no intracranial hemorrhage,  extra-axial collection, or mass effect. The basilar cisterns are open. No CT  evidence of acute infarct. The bone windows demonstrate no abnormalities. The visualized portions of the  paranasal sinuses and mastoid air cells are clear. Impression  No acute intracranial abnormality. I did independently review the head CT from September 20, 2021. There is no acute abnormalities. CTA performed on September 20, 2021 revealed no large vessel flow-limiting stenosis on preliminary review. Active Problems:    TIA (transient ischemic attack) (9/28/2021)      I spent  70   minutes providing care to this  acutely ill inpatient with > 50% of the time counseling and assisting in the coordination of care of the patient on the patient's hospital floor/unit.                     Signed By:  Eddy Prader, DO FAAN    September 29, 2021

## 2021-09-29 NOTE — PROGRESS NOTES
Speech path   Patient was referred per stroke protocol. Her head CT was negative for CVA. Seen by Dr. Charly Tellez. ? TIA. She has mild L facial weakness from Bell's palsy as an infant. Continues with mild L facial weakness. No aphasia or dysarthria. Will sign off as she has no needs.    Saurabh Sofia, SLP

## 2021-09-29 NOTE — H&P
Hospitalist Admission Note    NAME: Sheng Cooper   :  1975   MRN:  [id number]     Date/Time:  2021 9:54 PM    Patient PCP: Blaine Morris MD  ________________________________________________________________________    My assessment of this patient's clinical condition and my plan of care is as follows. Assessment / Plan:  Left facial numbness and pain rule out TIA  History of Bell's palsy with residual left-sided facial asymmetry chronic  -Evaluated by telemetry neurology in the ED and recommended admission to rule out TIA  -Check MRI of the brain, 2D echocardiogram.  Follow-up on final results of CTA  -Check hemoglobin A1c, and lipid profile and TSH  Start aspirin  -Consult PT OT and speech therapy. Consult neurology. Hypertension  -Hold home lisinopril to allow for permissive hypertension    Leukocytosis likely reactive  -She is afebrile.  -Check procalcitonin level. If procalcitonin level is elevated, consider further work-up      Code Status: Full code  Surrogate Decision Maker: Mother Kobi Pearson    DVT Prophylaxis: Lovenox  GI Prophylaxis: not indicated    Baseline: From home, independent of ADLs        Subjective:   CHIEF COMPLAINT: Left facial pain and numbness    HISTORY OF PRESENT ILLNESS:     Asmita Tan is a 41 y.o.   female who presents with past medical history of hypertension, Bell's palsy is coming the hospital chief complaints of left facial numbness and pain. Patient has a history of Bell's palsy and does have facial asymmetry on the left side but reports that she started having a new symptom of left-sided facial numbness along with pain which went to her left side of the neck. She also reports having some tingling of the left side of her face and also neck but the decreased sensation disappeared after about 10 minutes. She does not report any weakness of the left side of the right side depression does not report any numbness of extremities.   Does not report any vision changes. Denies slurred speech. Does report some headache. Does not report any chest pain or shortness of breath      On arrival to ED, vital signs are normal.  On labs white blood cell count is about 15,000. BMP shows a creatinine of 1.33. LFTs are normal.  Troponin is 0.05. CT head shows no acute process. CT a preliminary report shows no hemodynamically significant stenosis. CT perfusion preliminary report is normal.    We were asked to admit for work up and evaluation of the above problems. Past Medical History:   Diagnosis Date    Club foot     had surgical correction    GERD (gastroesophageal reflux disease)     \"mild\"    Hypertension     Pre-diabetes         Past Surgical History:   Procedure Laterality Date    HX BREAST REDUCTION  2017    HX DILATION AND CURETTAGE  10/2020    HX OTHER SURGICAL  1980s    right club foot surgery, right hip surgery, right knee surgery    HX SPLENECTOMY  1994    spleen laceration  leading to removal of spleen       Social History     Tobacco Use    Smoking status: Light Tobacco Smoker     Types: Cigars    Smokeless tobacco: Never Used    Tobacco comment: cigar on holidays   Substance Use Topics    Alcohol use: Yes     Alcohol/week: 3.0 - 4.0 standard drinks     Types: 3 - 4 Glasses of wine per week        Family History   Problem Relation Age of Onset    Diabetes Mother     Hypertension Mother     Coronary Artery Disease Father      No Known Allergies     Prior to Admission medications    Medication Sig Start Date End Date Taking? Authorizing Provider   lisinopriL (PRINIVIL, ZESTRIL) 10 mg tablet Take 1 Tab by mouth daily. 3/25/21  Yes John Husain MD       REVIEW OF SYSTEMS:     I am not able to complete the review of systems because:    The patient is intubated and sedated    The patient has altered mental status due to his acute medical problems    The patient has baseline aphasia from prior stroke(s)    The patient has baseline dementia and is not reliable historian    The patient is in acute medical distress and unable to provide information           Total of 12 systems reviewed as follows:       POSITIVE= underlined text  Negative = text not underlined  General:  fever, chills, sweats, generalized weakness, weight loss/gain,      loss of appetite   Eyes:    blurred vision, eye pain, loss of vision, double vision  ENT:    rhinorrhea, pharyngitis   Respiratory:   cough, sputum production, SOB, TALBERT, wheezing, pleuritic pain   Cardiology:   chest pain, palpitations, orthopnea, PND, edema, syncope   Gastrointestinal:  abdominal pain , N/V, diarrhea, dysphagia, constipation, bleeding   Genitourinary:  frequency, urgency, dysuria, hematuria, incontinence   Muskuloskeletal :  arthralgia, myalgia, back pain  Hematology:  easy bruising, nose or gum bleeding, lymphadenopathy   Dermatological: rash, ulceration, pruritis, color change / jaundice  Endocrine:   hot flashes or polydipsia   Neurological:  headache, dizziness, confusion, focal weakness, paresthesia,     Speech difficulties, memory loss, gait difficulty  Psychological: Feelings of anxiety, depression, agitation    Objective:   VITALS:    Visit Vitals  /82   Pulse (!) 59   Temp 98.5 °F (36.9 °C)   Resp 18   Ht 5' 2\" (1.575 m)   Wt 62.9 kg (138 lb 10.7 oz)   SpO2 99%   BMI 25.36 kg/m²       PHYSICAL EXAM:    General:    Alert, cooperative, no distress, appears stated age. HEENT: Atraumatic, anicteric sclerae, pink conjunctivae     No oral ulcers, mucosa moist, throat clear, dentition fair  Neck:  Supple, symmetrical,  thyroid: non tender  Lungs:   Clear to auscultation bilaterally. No Wheezing or Rhonchi. No rales. Chest wall:  No tenderness  No Accessory muscle use. Heart:   Regular  rhythm,  No  murmur   No edema  Abdomen:   Soft, non-tender. Not distended. Bowel sounds normal  Extremities: No cyanosis.   No clubbing,      Skin turgor normal, Capillary refill normal, Radial dial pulse 2+  Skin:     Not pale. Not Jaundiced  No rashes   Psych:  Not anxious or agitated. Neurologic: EOMs intact. Left facial asymmetry, no aphasia or slurred speech. Symmetrical strength, Sensation grossly intact. Alert and oriented X 4. Sensation on the face is intact on both sides    _______________________________________________________________________  Care Plan discussed with:    Comments   Patient y    Family      RN y    Care Manager                    Consultant:      _______________________________________________________________________  Expected  Disposition:   Home with Family y   HH/PT/OT/RN    SNF/LTC    ROSE    ________________________________________________________________________  TOTAL TIME:  61 Minutes    Critical Care Provided     Minutes non procedure based      Comments    y Reviewed previous records   >50% of visit spent in counseling and coordination of care y Discussion with patient and/or family and questions answered       ________________________________________________________________________  Signed: Cherri Coronel MD    Procedures: see electronic medical records for all procedures/Xrays and details which were not copied into this note but were reviewed prior to creation of Plan.     LAB DATA REVIEWED:    Recent Results (from the past 24 hour(s))   GLUCOSE, POC    Collection Time: 09/28/21  7:48 PM   Result Value Ref Range    Glucose (POC) 142 (H) 65 - 117 mg/dL    Performed by Tish Fairbanks RN    CBC WITH AUTOMATED DIFF    Collection Time: 09/28/21  7:50 PM   Result Value Ref Range    WBC 15.7 (H) 3.6 - 11.0 K/uL    RBC 4.96 3.80 - 5.20 M/uL    HGB 13.2 11.5 - 16.0 g/dL    HCT 42.5 35.0 - 47.0 %    MCV 85.7 80.0 - 99.0 FL    MCH 26.6 26.0 - 34.0 PG    MCHC 31.1 30.0 - 36.5 g/dL    RDW 15.2 (H) 11.5 - 14.5 %    PLATELET 993 (H) 052 - 400 K/uL    MPV 10.5 8.9 - 12.9 FL    NRBC 0.0 0  WBC    ABSOLUTE NRBC 0.00 0.00 - 0.01 K/uL    NEUTROPHILS 77 (H) 32 - 75 % LYMPHOCYTES 17 12 - 49 %    MONOCYTES 5 5 - 13 %    EOSINOPHILS 0 0 - 7 %    BASOPHILS 0 0 - 1 %    IMMATURE GRANULOCYTES 1 (H) 0.0 - 0.5 %    ABS. NEUTROPHILS 12.1 (H) 1.8 - 8.0 K/UL    ABS. LYMPHOCYTES 2.7 0.8 - 3.5 K/UL    ABS. MONOCYTES 0.8 0.0 - 1.0 K/UL    ABS. EOSINOPHILS 0.0 0.0 - 0.4 K/UL    ABS. BASOPHILS 0.0 0.0 - 0.1 K/UL    ABS. IMM. GRANS. 0.1 (H) 0.00 - 0.04 K/UL    DF AUTOMATED     METABOLIC PANEL, COMPREHENSIVE    Collection Time: 09/28/21  7:50 PM   Result Value Ref Range    Sodium 139 136 - 145 mmol/L    Potassium 4.1 3.5 - 5.1 mmol/L    Chloride 110 (H) 97 - 108 mmol/L    CO2 27 21 - 32 mmol/L    Anion gap 2 (L) 5 - 15 mmol/L    Glucose 144 (H) 65 - 100 mg/dL    BUN 28 (H) 6 - 20 MG/DL    Creatinine 1.33 (H) 0.55 - 1.02 MG/DL    BUN/Creatinine ratio 21 (H) 12 - 20      GFR est AA 53 (L) >60 ml/min/1.73m2    GFR est non-AA 43 (L) >60 ml/min/1.73m2    Calcium 10.4 (H) 8.5 - 10.1 MG/DL    Bilirubin, total 0.5 0.2 - 1.0 MG/DL    ALT (SGPT) 31 12 - 78 U/L    AST (SGOT) 14 (L) 15 - 37 U/L    Alk.  phosphatase 97 45 - 117 U/L    Protein, total 8.4 (H) 6.4 - 8.2 g/dL    Albumin 4.1 3.5 - 5.0 g/dL    Globulin 4.3 (H) 2.0 - 4.0 g/dL    A-G Ratio 1.0 (L) 1.1 - 2.2     PROTHROMBIN TIME + INR    Collection Time: 09/28/21  7:50 PM   Result Value Ref Range    INR 1.0 0.9 - 1.1      Prothrombin time 10.4 9.0 - 11.1 sec   TROPONIN I    Collection Time: 09/28/21  7:50 PM   Result Value Ref Range    Troponin-I, Qt. <0.05 <0.05 ng/mL   EKG, 12 LEAD, INITIAL    Collection Time: 09/28/21  8:31 PM   Result Value Ref Range    Ventricular Rate 61 BPM    Atrial Rate 61 BPM    P-R Interval 158 ms    QRS Duration 80 ms    Q-T Interval 388 ms    QTC Calculation (Bezet) 390 ms    Calculated P Axis 55 degrees    Calculated R Axis 31 degrees    Calculated T Axis 32 degrees    Diagnosis       Normal sinus rhythm  Possible Left atrial enlargement  Nonspecific T wave abnormality  No previous ECGs available     SED RATE (ESR) Collection Time: 09/28/21  8:41 PM   Result Value Ref Range    Sed rate, automated 9 0 - 20 mm/hr   D DIMER    Collection Time: 09/28/21  8:41 PM   Result Value Ref Range    D-dimer <0.19 0.00 - 0.65 mg/L FEU

## 2021-09-29 NOTE — DISCHARGE SUMMARY
Hospitalist Discharge Summary     Patient ID:  Sheena Boxer  [id number]  89 y.o.  1975  9/28/2021    PCP on record: Shanique Chang MD    Admit date: 9/28/2021  Discharge date and time: 9/29/2021    DISCHARGE DIAGNOSIS:    Left facial numbness and pain rule out TIA  History of Bell's palsy with residual left-sided facial asymmetry chronic  Hypertension  Leukocytosis    CONSULTATIONS:  IP CONSULT TO HOSPITALIST  IP CONSULT TO NEUROLOGY    Excerpted HPI from H&P of Laurel Goodpasture, MD:  CHIEF COMPLAINT: Left facial pain and numbness     HISTORY OF PRESENT ILLNESS:     Ashely Arnett is a 41 y.o.   female who presents with past medical history of hypertension, Bell's palsy is coming the hospital chief complaints of left facial numbness and pain. Patient has a history of Bell's palsy and does have facial asymmetry on the left side but reports that she started having a new symptom of left-sided facial numbness along with pain which went to her left side of the neck. She also reports having some tingling of the left side of her face and also neck but the decreased sensation disappeared after about 10 minutes. She does not report any weakness of the left side of the right side depression does not report any numbness of extremities. Does not report any vision changes. Denies slurred speech. Does report some headache. Does not report any chest pain or shortness of breath        On arrival to ED, vital signs are normal.  On labs white blood cell count is about 15,000. BMP shows a creatinine of 1.33. LFTs are normal.  Troponin is 0.05. CT head shows no acute process. CT a preliminary report shows no hemodynamically significant stenosis.   CT perfusion preliminary report is normal.     We were asked to admit for work up and evaluation of the above problems.        ______________________________________________________________________  DISCHARGE SUMMARY/HOSPITAL COURSE:  for full details see H&P, daily progress notes, labs, consult notes. Sebastian Elizabeth was admitted to [facility] on 9/28/2021 and treated for the following medical complaints:     Left facial numbness and pain rule out TIA  History of Bell's palsy with residual left-sided facial asymmetry chronic  -Evaluated by telemetry neurology in the ED and recommended admission to rule out TIA  - Head CT no acute intracranial abnormality   - CT Neuro Perf - showed Impression:    1. No acute vascular abnormality, no large vessel occlusion. Normal perfusion. No hemodynamically significant stenosis. 2. Interval increase in size of right-sided thyroid nodules compared to August 2019  - Followed by Endocrinologist for thyroid nodule   . - MRI of the brain pending   - echocardiogram. Showed LV: Estimated LVEF is 55 - 60%. Normal cavity size, wall thickness and systolic function (ejection fraction normal). · Trans thoracic bubble study negative for intracardiac shunting.   -Lazara hemoglobin A1c 5.9   -   lipid profile   Added Atorvastatin   -  and TSH 1.03  - Start aspirin  -Consult PT OT and speech therapy. - Appreciate  Neurology input recc starting pt on Medrol pack if MRI negative      Hypertension  -Hold home lisinopril to allow for permissive hypertension  - pt normotensive will hold till seen by PCP      Leukocytosis likely reactive  -She is afebrile.  -Check procalcitonin level <0.05 normal               _______________________________________________________________________  Patient seen and examined by me on discharge day. Pertinent Findings:  Gen:    Not in distress  Chest: Clear lungs  CVS:   Regular rhythm.   No edema  Abd:  Soft, not distended, not tender  Neuro:  Alert, Oriented x4   _______________________________________________________________________  DISCHARGE MEDICATIONS:   Current Discharge Medication List      START taking these medications    Details   aspirin 81 mg chewable tablet Take 1 Tablet by mouth daily for 30 days. Indications: stroke prevention  Qty: 30 Tablet, Refills: 0  Start date: 9/30/2021, End date: 10/30/2021      atorvastatin (LIPITOR) 40 mg tablet Take 1 Tablet by mouth nightly for 30 days. Indications: excessive fat in the blood  Qty: 30 Tablet, Refills: 0  Start date: 9/29/2021, End date: 10/29/2021      predniSONE (STERAPRED DS) 10 mg dose pack See administration instruction per 10mg dose pack  Qty: 21 Tablet, Refills: 0  Start date: 9/29/2021         CONTINUE these medications which have CHANGED    Details   lisinopriL (PRINIVIL, ZESTRIL) 10 mg tablet Take 1 Tablet by mouth daily. Qty: 30 Tablet, Refills: 5  Start date: 10/5/2021    Associated Diagnoses: Essential hypertension               Patient Follow Up Instructions: Activity: Activity as tolerated  Diet: Resume previous diet  Wound Care: None needed    Follow-up with PCP in 1 week.   Follow-up tests/labs BP check     Follow-up Information     Follow up With Specialties Details Why Contact Info    Vitor Gonzalez MD Internal Medicine Schedule an appointment as soon as possible for a visit  [address] [facility] [address] [facility] [address]      Halie Perera MD Orthopedic Surgery   [address]  [place] [address]  [phone]      Katerin Bradford MD Urology   [address] [facility]  [phone]      [facility] Gynecology, Gynecology, [address] [facility]  [phone]      Yogesh Alvarenga MD Nephrology   [address] [place] [address],[address]  [phone]          ________________________________________________________________    Risk of deterioration: Low    Condition at Discharge:  Stable  __________________________________________________________________    Disposition  Home with family, no needs    ____________________________________________________________________    Code Status: Full Code  ___________________________________________________________________      Total time in minutes spent coordinating this discharge (includes going over instructions, follow-up, prescriptions, and preparing report for sign off to her PCP) 35  minutes    Signed:  Delores Dye NP

## 2021-09-29 NOTE — ED NOTES
Patient is being transferred to [facility] Neuroscience ICU, Room # 66 65 76. Report given to Ubaldo Rodas RN on Shawna Opitz for routine progression of care. Report consisted of the following information SBAR, Kardex, ED Summary, MAR and Recent Results. Patient transferred to receiving unit by: Tech (RN or tech name). Outstanding consults needed: No     Next labs due: Yes    The following personal items will be sent with the patient during transfer to the floor: All valuables:    Cardiac monitoring ordered: Yes    The following CURRENT information was reported to the receiving RN:    Code status: Full Code at time of transfer    Last set of vital signs:  Vital Signs  Level of Consciousness: Alert (0) (09/28/21 1928)  Temp: 98.5 °F (36.9 °C) (09/28/21 1928)  Temp Source: Oral (09/28/21 1928)  Pulse (Heart Rate): (!) 59 (09/28/21 2115)  Cardiac Rhythm: Sinus Rhythm (09/28/21 2039)  Resp Rate: 18 (09/28/21 2115)  BP: 119/82 (09/28/21 2115)  MAP (Monitor): 91 (09/28/21 2115)  MAP (Calculated): 94 (09/28/21 2115)  BP 1 Location: Left upper arm (09/28/21 1928)  BP 1 Method: Automatic (09/28/21 1928)  MEWS Score: 1 (09/28/21 1928)         Oxygen Therapy  O2 Sat (%): 99 % (09/28/21 2115)  Pulse via Oximetry: 58 beats per minute (09/28/21 2115)  O2 Device: None (Room air) (09/28/21 2040)      Last pain assessment:  Pain 1  Pain Scale 1: Numeric (0 - 10)  Pain Intensity 1: 4  Patient Stated Pain Goal: 0  Pain Location 1: Head  Pain Description 1: Pressure  Pain Intervention(s) 1: Therapeutic presence      Wounds: No     Urinary catheter: voiding  Is there a woodward order: No     LDAs:       Peripheral IV 09/28/21 Right Antecubital (Active)         Opportunity for questions and clarification was provided.     Mac Kan RN

## 2021-09-29 NOTE — PROGRESS NOTES
OCCUPATIONAL THERAPY EVALUATION/DISCHARGE  Patient: Tasneem Sam (34 y.o. female)  Date: 9/29/2021  Primary Diagnosis: TIA (transient ischemic attack) [G45.9]       Precautions:    (none except recent TIA like symptoms)    ASSESSMENT  Based on the objective data described below, the patient presents with I self care and functional mobility; cognition, visual perceptual skills, coordination and strength intact; face continues to have mis-feeling/tingling, \"sofy horse in my face/asleep a bit. \" Patient aware of 1 sign/symptom of having CVA. Trained BE FAST and personal risk factors for CVA including high stress level, HTN and pre-diabetes. Provided stroke education handbook for further instruction. Current Level of Function (ADLs/self-care): I self care, cognition and mobility    Functional Outcome Measure: The patient scored Total A-D  Total A-D (Motor Function): 66/66 on the Fugl-Fajardo Assessment which is indicative of no impairment in upper extremity functional status. Other factors to consider for discharge: receptive to CVA prevention information     PLAN :  Recommendation for discharge: (in order for the patient to meet his/her long term goals)  No skilled occupational therapy/ follow up rehabilitation needs identified at this time. This discharge recommendation:  A follow-up discussion with the attending provider and/or case management is planned    IF patient discharges home will need the following DME: none       SUBJECTIVE:   Patient stated Oh I drove myself here; I wont do that again if I have more symptoms.     OBJECTIVE DATA SUMMARY:   HISTORY:   Past Medical History:   Diagnosis Date    Club foot     had surgical correction    GERD (gastroesophageal reflux disease)     \"mild\"    Hypertension     Pre-diabetes      Past Surgical History:   Procedure Laterality Date    HX BREAST REDUCTION  2017    HX DILATION AND CURETTAGE  10/2020    HX OTHER SURGICAL  1980s    right club foot surgery, right hip surgery, right knee surgery    HX SPLENECTOMY  1994    spleen laceration  leading to removal of spleen       Prior Level of Function/Environment/Context: I PTA, has full time job primarily sitting at computer  Expanded or extensive additional review of patient history:     Home Situation  Home Environment: Private residence (works from home; computer work)  # Steps to Enter: 2  Current DME Used/Available at Home: None  Tub or Shower Type: Tub/Shower combination    Hand dominance: Right    EXAMINATION OF PERFORMANCE DEFICITS:  Cognitive/Behavioral Status:  Neurologic State: Alert; Appropriate for age  Orientation Level: Oriented X4  Cognition: Appropriate decision making; Appropriate for age attention/concentration; Appropriate safety awareness; Follows commands  Perception: Appears intact  Perseveration: No perseveration noted  Safety/Judgement: Awareness of environment; Fall prevention;Driving appropriateness;Good awareness of safety precautions; Home safety; Insight into deficits    Skin: intact    Edema:none    Hearing: Auditory  Auditory Impairment: None    Vision/Perceptual:                           Acuity: Within Defined Limits (Draw A Clock and signature intact)    Corrective Lenses: Glasses    Range of Motion:    AROM: Within functional limits  PROM: Within functional limits                      Strength:    Strength: Within functional limits                Coordination:  Coordination: Within functional limits  Fine Motor Skills-Upper: Left Intact; Right Intact    Gross Motor Skills-Upper: Left Intact; Right Intact    Tone & Sensation:    Tone: Normal  Sensation: Impaired (presents with Misfeeling L side of faceand L UE)                      Balance:  Sitting: Intact  Standing: Intact    Functional Mobility and Transfers for ADLs:  Bed Mobility:  Rolling: Independent  Supine to Sit: Independent  Sit to Supine: Independent  Scooting: Independent    Transfers:  Sit to Stand: Independent  Stand to Sit: Independent  Bed to Chair: Independent  Toilet Transfer : Independent  Tub Transfer:  (trained bathroom safety, fall prevention, energy conservatio)    ADL Assessment:  Feeding: Independent    Oral Facial Hygiene/Grooming: Independent    Bathing: Independent    Upper Body Dressing: Independent    Lower Body Dressing: Independent    Toileting: Independent                ADL Intervention and task modifications:     Training of BE FAST with written instructions;, personal risk factors including high stress and need for stress management. Fall prevention and home safety  Cognitive Retraining  Safety/Judgement: Awareness of environment; Fall prevention;Driving appropriateness;Good awareness of safety precautions; Home safety; Insight into deficits      Functional Measure:  Fugl-Fajardo Assessment of Motor Recovery after Stroke:   Reflex Activity  Flexors/Biceps/Fingers: Can be elicited  Extensors/Triceps: Can be elicited  Reflex Subtotal: 4    Volitional Movement Within Synergies  Shoulder Retraction: Full  Shoulder Elevation: Full  Shoulder Abduction (90 degrees): Full  Shoulder External Rotation: Full  Elbow Flexion: Full  Forearm Supination: Full  Shoulder Adduction/Internal Rotation: Full  Elbow Extension: Full  Forearm Pronation: Full  Subtotal: 18    Volitional Movement Mixing Synergies  Hand to Lumbar Spine: Full  Shoulder Flexion (0-90 degrees): Full  Pronation-Supination: Full  Subtotal: 6    Volitional Movement With Little or No Synergy  Shoulder Abduction (0-90 degrees): Full  Shoulder Flexion ( degrees): Full  Pronation/Supination: Full  Subtotal : 6    Normal Reflex Activity  Biceps, Triceps, Finger Flexors:  Full  Subtotal : 2    Upper Extremity Total   Upper Extremity Total: 36    Wrist  Stability at 15 Degree Dorsiflexion: Full  Repeated Dorsiflexion/ Volar Flexion: Full  Stability at 15 Degree Dorsiflexion: Full  Repeated Dorsiflexion/ Volar Flexion: Full  Circumduction: Full  Wrist Total: 10    Hand  Mass Flexion: Full  Mass Extension: Full  Grasp A: Full  Grasp B: Full  Grasp C: Full  Grasp D: Full  Grasp E: Full  Hand Total: 14    Coordination/Speed  Tremor: None  Dysmetria: None  Time: <1s  Coordination/Speed Total : 6    Total A-D  Total A-D (Motor Function): 66/66     This is a reliable/valid measure of arm function after a neurological event. It has established value to characterize functional status and for measuring spontaneous and therapy-induced recovery; tests proximal and distal motor functions. Fugl-Fajardo Assessment - UE scores recorded between five and 30 days post neurologic event can be used to predict UE recovery at six months post neurologic event. Severe = 0-21 points   Moderately Severe = 22-33 points   Moderate = 34-47 points   Mild = 48-66 points  SOFIA Vela, SUZANNE Rico, & VICKEY Hernadez (1992). Measurement of motor recovery after stroke: Outcome assessment and sample size requirements.  Stroke, 23, pp. 5474-9365.   ------------------------------------------------------------------------------------------------------------------------------------------------------------------  MCID:  Stroke:   Rashel Parra et al, 2001; n = 171; mean age 79 (6) years; assessed within 16 (12) days of stroke, Acute Stroke)  FMA Motor Scores from Admission to Discharge   10 point increase in FMA Upper Extremity = 1.5 change in discharge FIM   10 point increase in FMA Lower Extremity = 1.9 change in discharge FIM  MDC:   Stroke:   Quiana Garza et al, 2008, n = 14, mean age = 59.9 (14.6) years, assessed on average 14 (6.5) months post stroke, Chronic Stroke)   FMA = 5.2 points for the Upper Extremity portion of the assessment     Occupational Therapy Evaluation Charge Determination   History Examination Decision-Making   LOW Complexity : Brief history review  LOW Complexity : 1-3 performance deficits relating to physical, cognitive , or psychosocial skils that result in activity limitations and / or participation restrictions  MEDIUM Complexity : Patient may present with comorbidities that affect occupational performnce. Miniml to moderate modification of tasks or assistance (eg, physical or verbal ) with assesment(s) is necessary to enable patient to complete evaluation       Based on the above components, the patient evaluation is determined to be of the following complexity level: LOW   Pain Rating:  No pain    Activity Tolerance:   Good    After treatment patient left in no apparent distress:    Sitting in chair, Call bell within reach and Restraints    COMMUNICATION/EDUCATION:   The patients plan of care was discussed with: Registered nurse. Patient was educated regarding her deficit(s) of L facial sensory deficits as this relates to her diagnosis of TIA. She demonstrated Good understanding as evidenced by questions and answers, request for written information re stroke prevention and symptoms. Patient and/or family was verbally educated on the BE FAST acronym for signs/symptoms of CVA and TIA. BE FAST was written on patient's paper for visual education and reinforcement.   All questions answered with patient indicating good understanding with handout provided    Thank you for this referral.  Pauly Fields OTR/L  Time Calculation: 40 mins

## 2021-09-29 NOTE — ROUTINE PROCESS

## 2021-09-29 NOTE — PROGRESS NOTES
Problem: Falls - Risk of  Goal: *Absence of Falls  Description: Document Don Baig Fall Risk and appropriate interventions in the flowsheet.   Outcome: Resolved/Met  Note: Fall Risk Interventions:            Medication Interventions: Evaluate medications/consider consulting pharmacy                   Problem: Patient Education: Go to Patient Education Activity  Goal: Patient/Family Education  Outcome: Resolved/Met     Problem: Patient Education: Go to Patient Education Activity  Goal: Patient/Family Education  Outcome: Resolved/Met     Problem: TIA/CVA Stroke: 0-24 hours  Goal: Off Pathway (Use only if patient is Off Pathway)  Outcome: Resolved/Met  Goal: Activity/Safety  Outcome: Resolved/Met  Goal: Consults, if ordered  Outcome: Resolved/Met  Goal: Diagnostic Test/Procedures  Outcome: Resolved/Met  Goal: Nutrition/Diet  Outcome: Resolved/Met  Goal: Discharge Planning  Outcome: Resolved/Met  Goal: Medications  Outcome: Resolved/Met  Goal: Respiratory  Outcome: Resolved/Met  Goal: Treatments/Interventions/Procedures  Outcome: Resolved/Met  Goal: Minimize risk of bleeding post-thrombolytic infusion  Outcome: Resolved/Met  Goal: Monitor for complications post-thrombolytic infusion  Outcome: Resolved/Met  Goal: Psychosocial  Outcome: Resolved/Met  Goal: *Hemodynamically stable  Outcome: Resolved/Met  Goal: *Neurologically stable  Description: Absence of additional neurological deficits    Outcome: Resolved/Met  Goal: *Verbalizes anxiety and depression are reduced or absent  Outcome: Resolved/Met  Goal: *Absence of Signs of Aspiration on Current Diet  Outcome: Resolved/Met  Goal: *Absence of deep venous thrombosis signs and symptoms(Stroke Metric)  Outcome: Resolved/Met  Goal: *Ability to perform ADLs and demonstrates progressive mobility and function  Outcome: Resolved/Met  Goal: *Stroke education started(Stroke Metric)  Outcome: Resolved/Met  Goal: *Dysphagia screen performed(Stroke Metric)  Outcome: Resolved/Met  Goal: *Rehab consulted(Stroke Metric)  Outcome: Resolved/Met     Problem: TIA/CVA Stroke: Day 2 Until Discharge  Goal: Off Pathway (Use only if patient is Off Pathway)  Outcome: Resolved/Met  Goal: Activity/Safety  Outcome: Resolved/Met  Goal: Diagnostic Test/Procedures  Outcome: Resolved/Met  Goal: Nutrition/Diet  Outcome: Resolved/Met  Goal: Discharge Planning  Outcome: Resolved/Met  Goal: Medications  Outcome: Resolved/Met  Goal: Respiratory  Outcome: Resolved/Met  Goal: Treatments/Interventions/Procedures  Outcome: Resolved/Met  Goal: Psychosocial  Outcome: Resolved/Met  Goal: *Verbalizes anxiety and depression are reduced or absent  Outcome: Resolved/Met  Goal: *Absence of aspiration  Outcome: Resolved/Met  Goal: *Absence of deep venous thrombosis signs and symptoms(Stroke Metric)  Outcome: Resolved/Met  Goal: *Optimal pain control at patient's stated goal  Outcome: Resolved/Met  Goal: *Tolerating diet  Outcome: Resolved/Met  Goal: *Ability to perform ADLs and demonstrates progressive mobility and function  Outcome: Resolved/Met  Goal: *Stroke education continued(Stroke Metric)  Outcome: Resolved/Met     Problem: Ischemic Stroke: Discharge Outcomes  Goal: *Verbalizes anxiety and depression are reduced or absent  Outcome: Resolved/Met  Goal: *Verbalize understanding of risk factor modification(Stroke Metric)  Outcome: Resolved/Met  Goal: *Hemodynamically stable  Outcome: Resolved/Met  Goal: *Absence of aspiration pneumonia  Outcome: Resolved/Met  Goal: *Aware of needed dietary changes  Outcome: Resolved/Met  Goal: *Verbalize understanding of prescribed medications including anti-coagulants, anti-lipid, and/or anti-platelets(Stroke Metric)  Outcome: Resolved/Met  Goal: *Tolerating diet  Outcome: Resolved/Met  Goal: *Aware of follow-up diagnostics related to anticoagulants  Outcome: Resolved/Met  Goal: *Ability to perform ADLs and demonstrates progressive mobility and function  Outcome: Resolved/Met  Goal: *Absence of DVT(Stroke Metric)  Outcome: Resolved/Met  Goal: *Absence of aspiration  Outcome: Resolved/Met  Goal: *Optimal pain control at patient's stated goal  Outcome: Resolved/Met  Goal: *Home safety concerns addressed  Outcome: Resolved/Met  Goal: *Describes available resources and support systems  Outcome: Resolved/Met  Goal: *Verbalizes understanding of activation of EMS(911) for stroke symptoms(Stroke Metric)  Outcome: Resolved/Met  Goal: *Understands and describes signs and symptoms to report to providers(Stroke Metric)  Outcome: Resolved/Met  Goal: *Neurolgocially stable (absence of additional neurological deficits)  Outcome: Resolved/Met  Goal: *Verbalizes importance of follow-up with primary care physician(Stroke Metric)  Outcome: Resolved/Met  Goal: *Smoking cessation discussed,if applicable(Stroke Metric)  Outcome: Resolved/Met  Goal: *Depression screening completed(Stroke Metric)  Outcome: Resolved/Met

## 2021-09-29 NOTE — PROGRESS NOTES
Transition of Care Plan:    RUR: N/A, observation status   Disposition: home, lives with mother   Follow up appointments: PCP  DME needed: N/A  Transportation at Discharge: self  Keys or means to access home: yes      Medicare Letter: N/A, has Medicaid   Is patient a BCPI-A Bundle: No      If yes, was Bundle Letter given?:     Caregiver Contact: Jose Chen (mother)   Discharge Caregiver contacted prior to discharge? No      Oral and Written notification given to patient and/or caregiver informing them that they are currently an Outpatient receiving care in our facility. Outpatient services include Observation Services. Pt signed copy of state observation letter which was placed into chart. Pt was provided with copy of letter to keep. Pt is up ad kitty, ambulating the hallways. Pt drove self to hospital and will drive self home. Awaiting MRI. No anticipated CM needs identified. Care Management Interventions  PCP Verified by CM:  Yes (Dr. Michel Gray )  Last Visit to PCP: 03/29/21  Palliative Care Criteria Met (RRAT>21 & CHF Dx)?: No  Mode of Transport at Discharge: Self  Transition of Care Consult (CM Consult): Discharge Planning  Discharge Durable Medical Equipment: No  Physical Therapy Consult: Yes  Occupational Therapy Consult: Yes  Speech Therapy Consult: Yes  Support Systems: Parent(s)  Confirm Follow Up Transport: Family  The Patient and/or Patient Representative was Provided with a Choice of Provider and Agrees with the Discharge Plan?: Yes  Freedom of Choice List was Provided with Basic Dialogue that Supports the Patient's Individualized Plan of Care/Goals, Treatment Preferences and Shares the Quality Data Associated with the Providers?: No  [place] Resource Information Provided?: No  Discharge Location  Discharge Placement: Home    Anival Diaz, [facility] Bhavin Gonzalez, [facility]  [phone]

## 2021-09-29 NOTE — PROGRESS NOTES
End of Shift Note    Bedside shift change report given to Nini Vizcarra (oncoming nurse) by Herrera Persaud RN (offgoing nurse). Report included the following information SBAR    Shift worked:  [phone]     Shift summary and any significant changes: Followed POC as ordered. Patient ID'd with two identifiers. Prioritized safety at all times. Bed alarm plugged in and activated. Pt. oriented to call bell. Practiced infection prevention and hand hygiene. Managed pain as ordered. Clustered care while rounding hourly. Assessed pt., monitored vital signs, and administered medications. Encouraged patient to turn and/or shift weight. Assisted with ADL's, mobility, toileting, and hygiene. Encouraged CHG infection prevention treatment. Provided pt. education and employed therapeutic communication. Collaborated as part of the health care team. Advocated for pt. Monitored I/O's and tracked calorie consumption. Monitored lab values. Preserved IV access. Fall precautions Pt. on tele. Pt had ECHO. Pt waited around all day for MRI that finally took place @ ~ 1830. TO be discharged following read if benign. No acute events, vital signs stable. Pt. denied cardiac discomfort, nausea, and SOB. Pt denied pain. A&OX4. Pt reports full sensation except for numbness in left side of face which is the reason for her hospitalization. No sensory deficits. Positive pulses. Active bowel sounds. Lungs clear bilaterally. Voids without difficulty.      Concerns for physician to address:         Zone phone for oncoming shift:   [phone]       Activity:  Activity Level: Up ad kitty  Number times ambulated in hallways past shift: 4  Number of times OOB to chair past shift: 5    Cardiac:   Cardiac Monitoring: Yes      Cardiac Rhythm: Sinus Christian    Access:   Current line(s): PIV     Genitourinary:   Urinary status: voiding    Respiratory:   O2 Device: None (Room air)  Chronic home O2 use?: NO  Incentive spirometer at bedside: YES     GI:     Current diet:  ADULT DIET Regular; Low Fat/Low Chol/High Fiber/PRASAD  Passing flatus: YES  Tolerating current diet: YES       Pain Management:   Patient states pain is manageable on current regimen: YES    Skin:  Chele Score: 23  Interventions: PT/OT consult    Patient Safety:  Fall Score:  Total Score: 1  Interventions: gripper socks       Length of Stay:  Expected LOS: - - -  Actual LOS: 0      Sofie Medeiros RN

## 2021-09-29 NOTE — PROGRESS NOTES
Physical Therapy  Order received and acknowledged. Pt referred per stroke protocol. Head CT negative for CVA. Seen by Dr. Russell Story. ? TIA. She has mild L facial weakness from Bell's palsy as an infant. Continues with mild L facial weakness and reports it feels like she is getting Charley horses in her face. Her mobility has not been affected and she is I and at her baseline for all mobility. Will sign off as she has no needs.   Favian Cochran, PT

## 2021-09-30 PROCEDURE — 99218 HC RM OBSERVATION: CPT

## 2021-10-03 NOTE — PROGRESS NOTES
Calcium level is high need to investigate cause of this.    Ordering Parathyroid scan  Will discuss this further at appointment coming up

## 2021-10-05 ENCOUNTER — VIRTUAL VISIT (OUTPATIENT)
Dept: INTERNAL MEDICINE CLINIC | Age: 46
End: 2021-10-05
Payer: COMMERCIAL

## 2021-10-05 DIAGNOSIS — I10 ESSENTIAL HYPERTENSION: ICD-10-CM

## 2021-10-05 DIAGNOSIS — Z09 HOSPITAL DISCHARGE FOLLOW-UP: Primary | ICD-10-CM

## 2021-10-05 DIAGNOSIS — E04.9 ENLARGED THYROID: ICD-10-CM

## 2021-10-05 DIAGNOSIS — E83.52 HIGH CALCIUM LEVELS: ICD-10-CM

## 2021-10-05 PROCEDURE — 99214 OFFICE O/P EST MOD 30 MIN: CPT | Performed by: INTERNAL MEDICINE

## 2021-10-05 NOTE — PROGRESS NOTES
CC: ED Follow-up (went to the ER due to a sharp pain under pt's ear- eye froze, left size of mouth froze, jaw locked. episode lasted about 20 seconds), Medication Evaluation (pt wants to discuss the medications she has been prescribed), and Other (pt states that left side of side of face feels flared/numb)      HPI:    She is a 40 y.o. 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 No acute vascular abnormality, no large vessel occlusion. Normal perfusion. No hemodynamically significant stenosis. 2. Interval increase in size of right-sided thyroid nodules compared to August 2019    .  - MRI of the brain no acute findings  - echocardiogram. Showed LV: Estimated LVEF is 55 - 60%. Normal cavity size, wall thickness and systolic function (ejection fraction normal). · Trans thoracic bubble study negative for intracardiac shunting.   -[place] hemoglobin A1c 5.9   -   lipid profile   Added Atorvastatin   -  and TSH 1.03  - Start aspirin  -Consult PT OT and speech therapy.    - Neurology input recc starting pt on Medrol pack if MRI negative - patient did not take steroids     Patient is still having left facial numbness etiology is unclear    Hypertension  -holding lisinopril - I advised her to resume it today     Leukocytosis likely reactive  -Check procalcitonin level <0.05 normal                 This is an established visit conducted via telemedicine with video. The patient has been instructed that this meets HIPAA criteria and acknowledges and agrees to this method of visitation.      Pursuant to the emergency declaration under the [employer] and the [address] authority and the Coronavirus Preparedness and [employer] Act, this Virtual Visit was conducted, with patient's consent, to reduce the patient's risk of exposure to COVID-19 and provide continuity of care for an established patient. Services were provided through a video synchronous discussion virtually to substitute for in-person clinic visit. ROS:  Constitutional: negative for fevers, chills, anorexia and weight loss  10 systems reviewed and negative other then HPI    Past Medical History:   Diagnosis Date    Club foot     had surgical correction    GERD (gastroesophageal reflux disease)     \"mild\"    Hypertension     Pre-diabetes        Current Outpatient Medications on File Prior to Visit   Medication Sig Dispense Refill    aspirin 81 mg chewable tablet Take 1 Tablet by mouth daily for 30 days. Indications: stroke prevention (Patient not taking: Reported on 10/5/2021) 30 Tablet 0    atorvastatin (LIPITOR) 40 mg tablet Take 1 Tablet by mouth nightly for 30 days. Indications: excessive fat in the blood (Patient not taking: Reported on 10/5/2021) 30 Tablet 0    lisinopriL (PRINIVIL, ZESTRIL) 10 mg tablet Take 1 Tablet by mouth daily. (Patient not taking: Reported on 10/5/2021) 30 Tablet 5    predniSONE (STERAPRED DS) 10 mg dose pack See administration instruction per 10mg dose pack (Patient not taking: Reported on 10/5/2021) 21 Tablet 0     No current facility-administered medications on file prior to visit.        Past Surgical History:   Procedure Laterality Date    HX BREAST REDUCTION  2017    HX DILATION AND CURETTAGE  10/2020    HX OTHER SURGICAL  1980s    right club foot surgery, right hip surgery, right knee surgery    HX SPLENECTOMY  1994    spleen laceration  leading to removal of spleen       Family History   Problem Relation Age of Onset    Diabetes Mother     Hypertension Mother     Coronary Artery Disease Father      Reviewed and no changes     Social History     Socioeconomic History    Marital status: SINGLE     Spouse name: Not on file    Number of children: Not on file    Years of education: Not on file    Highest education level: Not on file   Occupational History    Not on file   Tobacco Use    Smoking status: Light Tobacco Smoker     Types: Cigars    Smokeless tobacco: Never Used    Tobacco comment: cigar on holidays   Substance and Sexual Activity    Alcohol use: Yes     Alcohol/week: 3.0 - 4.0 standard drinks     Types: 3 - 4 Glasses of wine per week    Drug use: Never    Sexual activity: Not Currently   Other Topics Concern    Not on file   Social History Narrative    Not on file     Social Determinants of Health     Financial Resource Strain:     Difficulty of Paying Living Expenses:    Food Insecurity:     Worried About Running Out of Food in the Last Year:     [address] in the Last Year:    Transportation Needs:     Lack of Transportation (Medical):  Lack of Transportation (Non-Medical):    Physical Activity:     Days of Exercise per Week:     Minutes of Exercise per Session:    Stress:     Feeling of Stress :    Social Connections:     Frequency of Communication with Friends and Family:     Frequency of Social Gatherings with Friends and Family:     Attends [employer] Services:     Active Member of Clubs or Organizations:     Attends Club or Organization Meetings:     Marital Status:    Intimate Partner Violence:     Fear of Current or Ex-Partner:     Emotionally Abused:     Physically Abused:     Sexually Abused: There were no vitals taken for this visit. Physical Examination:   Gen: well appearing female  HEENT: normal conjunctiva, no audible congestion, patient does not see oral erythema, has MMM  Neck: patient does not feel enlarged or tender LAD or masses  Resp: normal respiratory effort, no audible wheezing.    CV: patient does not feel palpitations or heart irregularity  Abd: patient does not feel abdominal tenderness or mass, patient does not notice distension  Extrem: patient does not see swelling in ankles or joints. Neuro: Alert and oriented, able to answer questions without difficulty, able to move all extremities and walk normally          Lab Results   Component Value Date/Time    WBC 13.2 (H) 09/29/2021 05:04 AM    HGB 13.0 09/29/2021 05:04 AM    HCT 41.6 09/29/2021 05:04 AM    PLATELET 391 31/82/6874 05:04 AM    MCV 85.2 09/29/2021 05:04 AM     Lab Results   Component Value Date/Time    Sodium 138 09/29/2021 05:04 AM    Potassium 4.6 09/29/2021 05:04 AM    Chloride 112 (H) 09/29/2021 05:04 AM    CO2 22 09/29/2021 05:04 AM    Anion gap 4 (L) 09/29/2021 05:04 AM    Glucose 108 (H) 09/29/2021 05:04 AM    BUN 22 (H) 09/29/2021 05:04 AM    Creatinine 1.08 (H) 09/29/2021 05:04 AM    BUN/Creatinine ratio 20 09/29/2021 05:04 AM    GFR est AA >60 09/29/2021 05:04 AM    GFR est non-AA 55 (L) 09/29/2021 05:04 AM    Calcium 10.6 (H) 09/29/2021 05:04 AM     Lab Results   Component Value Date/Time    Cholesterol, total 199 09/30/2020 12:57 PM    HDL Cholesterol 61 09/30/2020 12:57 PM    LDL, calculated 107 (H) 09/30/2020 12:57 PM    LDL, calculated 139 (H) 03/21/2019 09:09 AM    VLDL, calculated 31 09/30/2020 12:57 PM    VLDL, calculated 29 03/21/2019 09:09 AM    Triglyceride 183 (H) 09/30/2020 12:57 PM     Lab Results   Component Value Date/Time    TSH 1.03 09/29/2021 05:04 AM     No results found for: PSA, Oddis Cunas, PSAR3, [id number], [id number]  Lab Results   Component Value Date/Time    Hemoglobin A1c 5.9 (H) 09/29/2021 05:04 AM     Lab Results   Component Value Date/Time    VITAMIN D, 25-HYDROXY 24.1 (L) 02/11/2021 10:40 AM       Lab Results   Component Value Date/Time    ALT (SGPT) 31 09/28/2021 07:50 PM    Alk. phosphatase 97 09/28/2021 07:50 PM    Bilirubin, total 0.5 09/28/2021 07:50 PM           Assessment/Plan:    1. Hospital discharge follow-up  Had facial numbness and had stroke work up negative  Thyroid nodule appears bigger  - US THYROID/PARATHYROID/SOFT TISS; Future    2. High calcium levels  - US THYROID/PARATHYROID/SOFT TISS; Future  - REFERRAL TO ENDOCRINOLOGY    3. Enlarged thyroid  - US THYROID/PARATHYROID/SOFT TISS; Future  - REFERRAL TO ENDOCRINOLOGY    4. HTN: resume lisinopril    5. Pre diabetes: counseled on low sugar diet           Kristi Serrano MD    This is an established visit conducted via real time video and audio telemedicine. The patient has been instructed that this meets HIPAA criteria and acknowledges and agrees to this method of visitation.

## 2021-10-05 NOTE — PROGRESS NOTES
Reviewed record in preparation for visit and have obtained necessary documentation. Identified pt with two pt identifiers(name and ). Chief Complaint   Patient presents with   [facility] ED Follow-up     went to the ER due to a sharp pain under pt's ear- eye froze, left size of mouth froze, jaw locked. episode lasted about 20 seconds    Medication Evaluation     pt wants to discuss the medications she has been prescribed    Other     pt states that left side of side of face feels flared/numb       Health Maintenance Due   Topic Date Due    Hepatitis C Test  Never done    DTaP/Tdap/Td  (1 - Tdap) Never done    Cervical cancer screen  Never done    Yearly Flu Vaccine (1) Never done    Cholesterol Test   2021    Colorectal Screening  10/01/2021       Ms. Indra Tafoya has a reminder for a \"due or due soon\" health maintenance. I have asked that she discuss this further with her primary care provider for follow-up on this health maintenance. Coordination of Care Questionnaire:  :     1) Have you been to an emergency room, urgent care clinic since your last visit? yes   Hospitalized since your last visit? yes 21             2) Have you seen or consulted any other health care providers outside of [address] [place] [address] since your last visit? yes, no(Include any pap smears or colon screenings in this section.)  Yes, GYNO  3) In the event something were to happen to you and you were unable to speak on your behalf, do you have an Advance Directive/ Living Will in place stating your wishes? NO    Do you have an Advance Directive on file? no    4) Are you interested in receiving information on Advance Directives? NO    Patient is accompanied by self I have received verbal consent from Ayse Fink to discuss any/all medical information while they are present in the room.

## 2021-10-06 ENCOUNTER — PATIENT MESSAGE (OUTPATIENT)
Dept: INTERNAL MEDICINE CLINIC | Age: 46
End: 2021-10-06

## 2021-10-06 DIAGNOSIS — E83.52 HIGH CALCIUM LEVELS: Primary | ICD-10-CM

## 2021-10-06 DIAGNOSIS — E04.1 THYROID NODULE: ICD-10-CM

## 2021-10-15 ENCOUNTER — HOSPITAL ENCOUNTER (OUTPATIENT)
Dept: NUCLEAR MEDICINE | Age: 46
Discharge: HOME | End: 2021-10-15
Attending: INTERNAL MEDICINE
Payer: COMMERCIAL

## 2021-10-15 ENCOUNTER — HOSPITAL ENCOUNTER (OUTPATIENT)
Dept: ULTRASOUND IMAGING | Age: 46
Discharge: HOME | End: 2021-10-15
Attending: INTERNAL MEDICINE
Payer: COMMERCIAL

## 2021-10-15 DIAGNOSIS — E83.52 HIGH CALCIUM LEVELS: ICD-10-CM

## 2021-10-15 DIAGNOSIS — Z09 HOSPITAL DISCHARGE FOLLOW-UP: ICD-10-CM

## 2021-10-15 DIAGNOSIS — E83.52 SERUM CALCIUM ELEVATED: ICD-10-CM

## 2021-10-15 DIAGNOSIS — E04.9 ENLARGED THYROID: ICD-10-CM

## 2021-10-15 PROCEDURE — 76536 US EXAM OF HEAD AND NECK: CPT

## 2021-10-15 PROCEDURE — 78070 PARATHYROID PLANAR IMAGING: CPT

## 2021-10-15 RX ORDER — TETRAKIS(2-METHOXYISOBUTYLISOCYANIDE)COPPER(I) TETRAFLUOROBORATE 1 MG/ML
25 INJECTION, POWDER, LYOPHILIZED, FOR SOLUTION INTRAVENOUS
Status: COMPLETED | OUTPATIENT
Start: 2021-10-15 | End: 2021-10-15

## 2021-10-15 RX ADMIN — TETRAKIS(2-METHOXYISOBUTYLISOCYANIDE)COPPER(I) TETRAFLUOROBORATE 25 MILLICURIE: 1 INJECTION, POWDER, LYOPHILIZED, FOR SOLUTION INTRAVENOUS at 11:00

## 2021-11-15 NOTE — TELEPHONE ENCOUNTER
Gaby, Generic 10/27/2021 12:41 PM EDT    Whenever you find the time. Darci Tom I'd rather have the discussion first, before taking anymore action. .. I'm wondering why the emergency room is saying it's bigger and the the Nephfiona Arnold, is asking to check it. I'm just getting these ups and downs and don't know really what to do. Would I want to know if something is cancerous. .. yes :(    And also, is the prescription for a Blood Glucose Test machine even a thing? If so, is that an option for me.      I am aware of the pre-diabetes with no direction and I am just going down this google rabbit hole :(     Everyone in my family with it, does not do anything to reverse it, so that are not a source of info for me, sadly    I don' t know where I am suppose to start with this

## 2021-12-03 ENCOUNTER — OFFICE VISIT (OUTPATIENT)
Dept: SURGERY | Age: 46
End: 2021-12-03
Payer: COMMERCIAL

## 2021-12-03 VITALS
TEMPERATURE: 97.3 F | RESPIRATION RATE: 16 BRPM | OXYGEN SATURATION: 99 % | DIASTOLIC BLOOD PRESSURE: 74 MMHG | BODY MASS INDEX: 25.64 KG/M2 | WEIGHT: 139.3 LBS | HEART RATE: 75 BPM | SYSTOLIC BLOOD PRESSURE: 128 MMHG | HEIGHT: 62 IN

## 2021-12-03 DIAGNOSIS — E04.1 THYROID NODULE: Primary | ICD-10-CM

## 2021-12-03 DIAGNOSIS — E21.0 PRIMARY HYPERPARATHYROIDISM (HCC): ICD-10-CM

## 2021-12-03 PROCEDURE — 99204 OFFICE O/P NEW MOD 45 MIN: CPT | Performed by: SURGERY

## 2021-12-03 RX ORDER — TIZANIDINE 2 MG/1
2 TABLET ORAL AS NEEDED
COMMUNITY
Start: 2021-08-31 | End: 2022-02-03

## 2021-12-03 NOTE — PROGRESS NOTES
HISTORY OF PRESENT ILLNESS  Noble Fung is a 40 y.o. female. who comes in for consultation by Ivon Laboy MD for thyroid nodules and hypercalcemia  HPI  She was noted to have have elevated calcium with a calcium of 11.4 and PTH of 70. A thyroid US and NM study did not demonstrate an adenoma but noted a mixed cystic/solid 1.4 cm right lower pole nodule and 1.2 cm cystic isthmus nodule of the thyroid, neither of which are significantly changed since 8/2019. She denies dysphagia, neck pressure or pain, voice changes, palpitations, unexplained weight loss/gain. She reports that her mother had primary hyperparathyroidism from four gland hyperplasia. Past Medical History:   Diagnosis Date    Arthritis     Club foot     had surgical correction    GERD (gastroesophageal reflux disease)     \"mild\"    Hemihypertrophy     Hypertension     Pre-diabetes      Past Surgical History:   Procedure Laterality Date    HX BREAST REDUCTION  2017    HX DILATION AND CURETTAGE  10/2020    HX ORTHOPAEDIC      multiple surgeries to Rt side of her body    HX OTHER SURGICAL  1980s    right club foot surgery, right hip surgery, right knee surgery    HX SPLENECTOMY  1994    spleen laceration  leading to removal of spleen     Family History   Problem Relation Age of Onset    Diabetes Mother     Hypertension Mother     Coronary Art Dis Father     Cancer Father         liver & prostate     Social History     Tobacco Use    Smoking status: Light Tobacco Smoker     Types: Cigars    Smokeless tobacco: Never Used    Tobacco comment: cigar on holidays   Vaping Use    Vaping Use: Never used   Substance Use Topics    Alcohol use: Yes     Alcohol/week: 3.0 - 4.0 standard drinks     Types: 3 - 4 Glasses of wine per week    Drug use: Yes     Types: Marijuana     Comment: rarely     Current Outpatient Medications   Medication Sig    tiZANidine (ZANAFLEX) 2 mg tablet Take 2 mg by mouth as needed.     lisinopriL (PRINIVIL, ZESTRIL) 10 mg tablet Take 1 Tablet by mouth daily. No current facility-administered medications for this visit. No Known Allergies    Review of Systems   Constitutional: Negative for chills, diaphoresis, fever and weight loss. HENT: Negative for sore throat. Eyes: Negative for blurred vision and discharge. Respiratory: Negative for cough, shortness of breath and wheezing. Cardiovascular: Negative for chest pain, palpitations, orthopnea, claudication and leg swelling. Gastrointestinal: Positive for heartburn. Negative for abdominal pain, constipation, diarrhea, melena, nausea and vomiting. Genitourinary: Negative for dysuria, flank pain, frequency and hematuria. Musculoskeletal: Positive for back pain, joint pain and myalgias. Negative for neck pain. Skin: Negative for rash. Neurological: Positive for sensory change (numbness). Negative for dizziness, speech change, focal weakness, seizures, loss of consciousness, weakness and headaches. Endo/Heme/Allergies: Does not bruise/bleed easily. Psychiatric/Behavioral: Negative for depression and memory loss. Visit Vitals  /74 (BP 1 Location: Right upper arm, BP Patient Position: Sitting)   Pulse 75   Temp 97.3 °F (36.3 °C) (Temporal)   Resp 16   Ht 5' 2\" (1.575 m)   Wt 63.2 kg (139 lb 4.8 oz)   SpO2 99%   BMI 25.48 kg/m²       Physical Exam  Constitutional:       General: She is not in acute distress. Appearance: She is well-developed. She is not diaphoretic. HENT:      Head: Normocephalic and atraumatic. Mouth/Throat:      Pharynx: No oropharyngeal exudate. Eyes:      General: No scleral icterus. Conjunctiva/sclera: Conjunctivae normal.      Pupils: Pupils are equal, round, and reactive to light. Neck:      Thyroid: No thyroid mass or thyromegaly. Vascular: No JVD. Trachea: Trachea and phonation normal. No tracheal deviation.    Cardiovascular:      Rate and Rhythm: Normal rate and regular rhythm. Heart sounds: No murmur heard. No friction rub. No gallop. Pulmonary:      Effort: Pulmonary effort is normal. No respiratory distress. Breath sounds: Normal breath sounds. No wheezing or rales. Abdominal:      General: Bowel sounds are normal. There is no distension. Palpations: Abdomen is soft. There is no mass. Tenderness: There is no abdominal tenderness. There is no guarding or rebound. Musculoskeletal:         General: Normal range of motion. Cervical back: Normal range of motion and neck supple. Lymphadenopathy:      Cervical: No cervical adenopathy. Right cervical: No superficial or deep cervical adenopathy. Left cervical: No superficial or deep cervical adenopathy. Skin:     General: Skin is warm and dry. Coloration: Skin is not pale. Findings: No erythema or rash. Neurological:      Mental Status: She is alert and oriented to person, place, and time. Cranial Nerves: No cranial nerve deficit. Psychiatric:         Behavior: Behavior normal.         Thought Content: Thought content normal.         Judgment: Judgment normal.       I reviewed her US images    ASSESSMENT and PLAN  1. Mixed cystic/solid thyroid nodule x 2 with minimal change over 2 years. I explained about the anatomy and pathophysiology of thyroid nodules/disease. I explained about possible malignancy. I discussed FNA, observation, possible thyroid suppression pending FNA, and total thyroidectomy. Risks of surgery include bleeding (potentially requiring emergent exploration at bedside), infection, parathyroid injury/removal requiring supplemental calcium +/- vit d, recurrent laryngeal/superior laryngeal nerve injury resulting in vocal cord paralysis, voice changes and fatigue, aspiration, further surgery, need for lifelong thyroid supplementation. 2.  Primary hyperparathyroidism.   I explained to her about the anatomy and pathophysiology of the process and likelihood of multigland hyperplasia. I explained about sequela of long term hyperplasia resulting in osteoporosis, kidney stones, bone pain, depression, abdominal pain. I explained about exploration and parathyroidectomy with possible reimplantation of a portion into the muscle and risks for permanent hypoparathyroidism, recurrent laryngeal nerve injury. 3. Hx  [place] palsy and numbness. TIA ruled out  4. Essential hypertension. Stable on rx  5.   Low vit D level    At this point she is planning to see endocrine Dr Mike Haq but not until next year  She prefers observation of the thyroid nodules with repeat US in 6 months  She prefers observation of the hyperparathyroidism until discussing with Dr Mike Haq  RTC after seeing Dr Cynthia Bonilla MD FACS

## 2021-12-04 PROBLEM — E04.1 THYROID NODULE: Status: ACTIVE | Noted: 2021-12-04

## 2021-12-04 PROBLEM — E21.0 PRIMARY HYPERPARATHYROIDISM (HCC): Status: ACTIVE | Noted: 2021-12-04

## 2022-01-14 ENCOUNTER — TELEPHONE (OUTPATIENT)
Dept: INTERNAL MEDICINE CLINIC | Age: 47
End: 2022-01-14

## 2022-01-14 NOTE — TELEPHONE ENCOUNTER
----- Message from Echo Fregoso sent at 1/14/2022  2:02 PM EST -----  Subject: Referral Request    QUESTIONS   Reason for referral request? patient is having ear trouble - feels clogged   when she yawns it feels like its opening it and then it goes back to muted   where shes having not good hearing. ear may be clogged - would like to be   referred to an ENT. tried to make an apt with kimberly but its showing all   virtual   Has the physician seen you for this condition before? No   Preferred Specialist (if applicable)? Do you already have an appointment scheduled? No  Additional Information for Provider?   ---------------------------------------------------------------------------  --------------  CALL BACK INFO  What is the best way for the office to contact you? OK to leave message on   voicemail  Preferred Call Back Phone Number?  [phone]

## 2022-01-18 ENCOUNTER — OFFICE VISIT (OUTPATIENT)
Dept: INTERNAL MEDICINE CLINIC | Age: 47
End: 2022-01-18
Payer: COMMERCIAL

## 2022-01-18 VITALS
SYSTOLIC BLOOD PRESSURE: 114 MMHG | OXYGEN SATURATION: 98 % | DIASTOLIC BLOOD PRESSURE: 79 MMHG | TEMPERATURE: 96.5 F | BODY MASS INDEX: 26.13 KG/M2 | HEART RATE: 64 BPM | WEIGHT: 142 LBS | HEIGHT: 62 IN

## 2022-01-18 DIAGNOSIS — J01.01 ACUTE RECURRENT MAXILLARY SINUSITIS: ICD-10-CM

## 2022-01-18 DIAGNOSIS — R73.03 PRE-DIABETES: ICD-10-CM

## 2022-01-18 DIAGNOSIS — E83.52 HIGH CALCIUM LEVELS: ICD-10-CM

## 2022-01-18 DIAGNOSIS — H93.8X3 CONGESTION OF BOTH EARS: Primary | ICD-10-CM

## 2022-01-18 LAB
ALBUMIN SERPL-MCNC: 3.8 G/DL (ref 3.5–5)
ALBUMIN/GLOB SERPL: 1.1 {RATIO} (ref 1.1–2.2)
ALP SERPL-CCNC: 83 U/L (ref 45–117)
ALT SERPL-CCNC: 26 U/L (ref 12–78)
ANION GAP SERPL CALC-SCNC: 1 MMOL/L (ref 5–15)
AST SERPL-CCNC: 12 U/L (ref 15–37)
BILIRUB SERPL-MCNC: 0.3 MG/DL (ref 0.2–1)
BUN SERPL-MCNC: 20 MG/DL (ref 6–20)
BUN/CREAT SERPL: 19 (ref 12–20)
CALCIUM SERPL-MCNC: 10 MG/DL (ref 8.5–10.1)
CALCIUM SERPL-MCNC: 10.1 MG/DL (ref 8.5–10.1)
CHLORIDE SERPL-SCNC: 110 MMOL/L (ref 97–108)
CO2 SERPL-SCNC: 27 MMOL/L (ref 21–32)
CREAT SERPL-MCNC: 1.04 MG/DL (ref 0.55–1.02)
EST. AVERAGE GLUCOSE BLD GHB EST-MCNC: 117 MG/DL
GLOBULIN SER CALC-MCNC: 3.4 G/DL (ref 2–4)
GLUCOSE SERPL-MCNC: 89 MG/DL (ref 65–100)
HBA1C MFR BLD: 5.7 % (ref 4–5.6)
POTASSIUM SERPL-SCNC: 5 MMOL/L (ref 3.5–5.1)
PROT SERPL-MCNC: 7.2 G/DL (ref 6.4–8.2)
PTH-INTACT SERPL-MCNC: 85.8 PG/ML (ref 18.4–88)
SODIUM SERPL-SCNC: 138 MMOL/L (ref 136–145)

## 2022-01-18 PROCEDURE — 99214 OFFICE O/P EST MOD 30 MIN: CPT | Performed by: INTERNAL MEDICINE

## 2022-01-18 RX ORDER — AZELASTINE 1 MG/ML
1 SPRAY, METERED NASAL 2 TIMES DAILY
Qty: 1 EACH | Refills: 0 | Status: SHIPPED | OUTPATIENT
Start: 2022-01-18

## 2022-01-18 RX ORDER — LEVOCETIRIZINE DIHYDROCHLORIDE 5 MG/1
5 TABLET, FILM COATED ORAL DAILY
Qty: 30 TABLET | Refills: 1 | Status: SHIPPED | OUTPATIENT
Start: 2022-01-18 | End: 2022-01-20

## 2022-01-18 RX ORDER — FLUTICASONE PROPIONATE 50 MCG
2 SPRAY, SUSPENSION (ML) NASAL DAILY
Qty: 1 EACH | Refills: 0 | Status: SHIPPED | OUTPATIENT
Start: 2022-01-18 | End: 2022-02-09

## 2022-01-18 NOTE — PROGRESS NOTES
Identified pt with two pt identifiers(name and ). Reviewed record in preparation for visit and have obtained necessary documentation. Chief Complaint   Patient presents with    Ear Fullness     Left ear        Vitals:    22 0840   BP: 114/79   Pulse: 64   Temp: (!) 96.5 °F (35.8 °C)   TempSrc: Temporal   SpO2: 98%   Weight: 142 lb (64.4 kg)   Height: 5' 2\" (1.575 m)   PainSc:   0 - No pain       Health Maintenance Due   Topic    Hepatitis C Screening     DTaP/Tdap/Td series (1 - Tdap)    Cervical cancer screen     Flu Vaccine (1)    Colorectal Cancer Screening Combo     COVID-19 Vaccine (3 - Booster for [employer] series)       Coordination of Care Questionnaire:  :   1) Have you been to an emergency room, urgent care, or hospitalized since your last visit? If yes, where when, and reason for visit? no       2. Have seen or consulted any other health care provider since your last visit? If yes, where when, and reason for visit? NO      Patient is accompanied by self I have received verbal consent from Radu Navarro to discuss any/all medical information while they are present in the room.

## 2022-01-18 NOTE — PROGRESS NOTES
Ms. Moe Holt is presenting to follow up     CC:  Ear Fullness (Left ear)       HPI:      41 yo woman with a hx of HTN and CKD stage 3 presenting with congestion in ears and  Chest for two weeks. No nasal discharge. Has been taking nyquil at night  No fatigue no fever, no loss of taste and smell  Vaccinated for COVID-19           Review of systems:  Constitutional: negative for fever, chills, weight loss, night sweats   10 systems reviewed and negative other then HPI       Past Medical History:   Diagnosis Date    Arthritis     Club foot     had surgical correction    GERD (gastroesophageal reflux disease)     \"mild\"    Hemihypertrophy     Hypertension     Pre-diabetes         Past Surgical History:   Procedure Laterality Date    HX BREAST REDUCTION  2017    HX DILATION AND CURETTAGE  10/2020    HX ORTHOPAEDIC      multiple surgeries to Rt side of her body    HX OTHER SURGICAL  1980s    right club foot surgery, right hip surgery, right knee surgery    HX SPLENECTOMY  1994    spleen laceration  leading to removal of spleen       No Known Allergies    Current Outpatient Medications on File Prior to Visit   Medication Sig Dispense Refill    lisinopriL (PRINIVIL, ZESTRIL) 10 mg tablet Take 1 Tablet by mouth daily. 30 Tablet 5    tiZANidine (ZANAFLEX) 2 mg tablet Take 2 mg by mouth as needed. (Patient not taking: Reported on 1/18/2022)       No current facility-administered medications on file prior to visit. family history includes Cancer in her father; Coronary Art Dis in her father; Diabetes in her mother; Hypertension in her mother.     Social History     Socioeconomic History    Marital status: SINGLE     Spouse name: Not on file    Number of children: Not on file    Years of education: Not on file    Highest education level: Not on file   Occupational History    Not on file   Tobacco Use    Smoking status: Light Tobacco Smoker     Types: Cigars    Smokeless tobacco: Never Used   [place] Tobacco comment: cigar on holidays   Vaping Use    Vaping Use: Never used   Substance and Sexual Activity    Alcohol use: Yes     Alcohol/week: 3.0 - 4.0 standard drinks     Types: 3 - 4 Glasses of wine per week    Drug use: Yes     Types: Marijuana     Comment: rarely    Sexual activity: Not on file   Other Topics Concern    Not on file   Social History Narrative    Not on file     Social Determinants of Health     Financial Resource Strain:     Difficulty of Paying Living Expenses: Not on file   Food Insecurity:     Worried About Running Out of Food in the Last Year: Not on file    Elizabeth of Food in the Last Year: Not on file   Transportation Needs:     Lack of Transportation (Medical): Not on file    Lack of Transportation (Non-Medical): Not on file   Physical Activity:     Days of Exercise per Week: Not on file    Minutes of Exercise per Session: Not on file   Stress:     Feeling of Stress : Not on file   Social Connections:     Frequency of Communication with Friends and Family: Not on file    Frequency of Social Gatherings with Friends and Family: Not on file    Attends Denominational Services: Not on file    Active Member of [address] [place] [address] Managed Systems or Organizations: Not on file    Attends Club or Organization Meetings: Not on file    Marital Status: Not on file   Intimate Partner Violence:     Fear of Current or Ex-Partner: Not on file    Emotionally Abused: Not on file    Physically Abused: Not on file    Sexually Abused: Not on file   Housing Stability:     Unable to Pay for Housing in the Last Year: Not on file    Number of [place] in the Last Year: Not on file    Unstable Housing in the Last Year: Not on file       Visit Vitals  /79   Pulse 64   Temp (!) 96.5 °F (35.8 °C) (Temporal)   Ht 5' 2\" (1.575 m)   Wt 142 lb (64.4 kg)   SpO2 98%   BMI 25.97 kg/m²     General:  Well appearing female no acute distress  HEENT:   PERRL,normal conjunctiva.  External ear and canals normal, TMs normal. Hearing normal to voice. Nose without edema or discharge, normal septum. Lips, teeth, gums normal.  Oropharynx: no erythema, no exudates, no lesions, normal tongue. Neck:  Supple. Thyroid normal size, nontender, without nodules. No carotid bruit. No masses or lymphadenopathy  Respiratory: no respiratory distress,  no wheezing, no rhonchi, no rales. No chest wall tenderness. Cardiovascular:  RRR, normal S1S2, no murmur. Gastrointestinal: normal bowel sounds, soft, nontender, without masses. No hepatosplenomegaly. Extremities +2 pulses, no edema, normal sensation   Musculoskeletal:  Normal gait. Normal digits and nails. Normal strength and tone, no atrophy, and no abnormal movement. Skin:  No rash, no lesions, no ulcers. Skin warm, normal turgor, without induration or nodules. Neuro:  A and OX4, fluent speech, cranial nerves normal 2-12. Sensation normal to light touch.   DTR symmetrical  Psych:  Normal affect      Lab Results   Component Value Date/Time    WBC 13.2 (H) 09/29/2021 05:04 AM    HGB 13.0 09/29/2021 05:04 AM    HCT 41.6 09/29/2021 05:04 AM    PLATELET 058 10/75/0138 05:04 AM    MCV 85.2 09/29/2021 05:04 AM     Lab Results   Component Value Date/Time    Sodium 138 09/29/2021 05:04 AM    Potassium 4.6 09/29/2021 05:04 AM    Chloride 112 (H) 09/29/2021 05:04 AM    CO2 22 09/29/2021 05:04 AM    Anion gap 4 (L) 09/29/2021 05:04 AM    Glucose 108 (H) 09/29/2021 05:04 AM    BUN 22 (H) 09/29/2021 05:04 AM    Creatinine 1.08 (H) 09/29/2021 05:04 AM    BUN/Creatinine ratio 20 09/29/2021 05:04 AM    GFR est AA >60 09/29/2021 05:04 AM    GFR est non-AA 55 (L) 09/29/2021 05:04 AM    Calcium 10.6 (H) 09/29/2021 05:04 AM     Lab Results   Component Value Date/Time    Cholesterol, total 199 09/30/2020 12:57 PM    HDL Cholesterol 61 09/30/2020 12:57 PM    LDL, calculated 107 (H) 09/30/2020 12:57 PM    LDL, calculated 139 (H) 03/21/2019 09:09 AM    VLDL, calculated 31 09/30/2020 12:57 PM    VLDL, calculated 29 03/21/2019 09:09 AM    Triglyceride 183 (H) 09/30/2020 12:57 PM     Lab Results   Component Value Date/Time    TSH 1.03 09/29/2021 05:04 AM     Lab Results   Component Value Date/Time    Hemoglobin A1c 5.9 (H) 09/29/2021 05:04 AM     Lab Results   Component Value Date/Time    VITAMIN D, 25-HYDROXY 24.1 (L) 02/11/2021 10:40 AM                   Assessment and Plan:     1. Congestion of both ears  - azelastine (ASTELIN) 137 mcg (0.1 %) nasal spray; 1 [employer] by Both Nostrils route two (2) times a day. Use in each nostril as directed  Dispense: 1 Each; Refill: 0  - fluticasone propionate (FLONASE) 50 mcg/actuation nasal spray; 2 Sprays by Both Nostrils route daily. Dispense: 1 Each; Refill: 0  - levocetirizine (XYZAL) 5 mg tablet; Take 1 Tablet by mouth daily. Dispense: 30 Tablet; Refill: 1    2. Acute recurrent maxillary sinusitis  No constitutional symptoms  - azelastine (ASTELIN) 137 mcg (0.1 %) nasal spray; 1 [employer] by Both Nostrils route two (2) times a day. Use in each nostril as directed  Dispense: 1 Each; Refill: 0  - fluticasone propionate (FLONASE) 50 mcg/actuation nasal spray; 2 Sprays by Both Nostrils route daily. Dispense: 1 Each; Refill: 0  - levocetirizine (XYZAL) 5 mg tablet; Take 1 Tablet by mouth daily. Dispense: 30 Tablet; Refill: 1    3. High calcium levels  - PTH INTACT; Future  - PTH-RELATED PEPTIDE; Future  - METABOLIC PANEL, COMPREHENSIVE; Future  Has appointment coming up with endocrine  4. Pre-diabetes  - METABOLIC PANEL, COMPREHENSIVE; Future  - HEMOGLOBIN A1C WITH EAG;  Future           Calista Whatley MD

## 2022-01-19 ENCOUNTER — TELEPHONE (OUTPATIENT)
Dept: INTERNAL MEDICINE CLINIC | Age: 47
End: 2022-01-19

## 2022-01-19 NOTE — TELEPHONE ENCOUNTER
The patient's insurance will not cover levocetirazine, please advise, thank you,   Signed By: Zeeshan Haque LPN     January 19, 2960

## 2022-01-19 NOTE — TELEPHONE ENCOUNTER
Two pt identifiers confirmed. I explained to the pt, her insurance would not pay for the levocetirazine; however,  said she can take   claritin or / [facility] which ever is cheaper OTC. The patient stated she would take OTC levocetirazine. Pt verbalized understanding of information discussed w/ no further questions at this time.   Signed By: Derek Cardoso LPN     January 19, 6334

## 2022-01-20 ENCOUNTER — TELEPHONE (OUTPATIENT)
Dept: INTERNAL MEDICINE CLINIC | Age: 47
End: 2022-01-20

## 2022-01-20 RX ORDER — CETIRIZINE HCL 10 MG
10 TABLET ORAL
Qty: 30 TABLET | Refills: 1 | Status: SHIPPED | OUTPATIENT
Start: 2022-01-20 | End: 2022-02-03

## 2022-01-20 NOTE — TELEPHONE ENCOUNTER
Patient insurance is not covering levocetirizine (XYZAL) 5 mg tablet  Do you want to send something else in for the patient?

## 2022-01-23 LAB — PTH RELATED PROT SERPL-SCNC: <2 PMOL/L

## 2022-01-25 NOTE — PROGRESS NOTES
Great news calcium normalized and since the imaging of neck did not show confirmed parathyroid mass no need to follow up wit endocrinologist    [facility] to cancel appointment with Dr Katie Greco

## 2022-01-27 ENCOUNTER — TELEPHONE (OUTPATIENT)
Dept: INTERNAL MEDICINE CLINIC | Age: 47
End: 2022-01-27

## 2022-01-27 NOTE — TELEPHONE ENCOUNTER
----- Message from Mendy Mondragon MD sent at 1/25/2022  5:49 PM EST -----  Great news calcium normalized and since the imaging of neck did not show confirmed parathyroid mass no need to follow up wit endocrinologist    [facility] to cancel appointment with Dr Christina Calhoun

## 2022-02-03 ENCOUNTER — OFFICE VISIT (OUTPATIENT)
Dept: ENDOCRINOLOGY | Age: 47
End: 2022-02-03
Payer: COMMERCIAL

## 2022-02-03 VITALS
HEART RATE: 67 BPM | DIASTOLIC BLOOD PRESSURE: 84 MMHG | HEIGHT: 62 IN | BODY MASS INDEX: 26.06 KG/M2 | SYSTOLIC BLOOD PRESSURE: 129 MMHG | WEIGHT: 141.6 LBS

## 2022-02-03 DIAGNOSIS — E04.2 MULTINODULAR GOITER (NONTOXIC): Primary | ICD-10-CM

## 2022-02-03 DIAGNOSIS — E83.52 HYPERCALCEMIA: ICD-10-CM

## 2022-02-03 DIAGNOSIS — E21.3 HYPERPARATHYROIDISM (HCC): ICD-10-CM

## 2022-02-03 PROCEDURE — 99205 OFFICE O/P NEW HI 60 MIN: CPT | Performed by: INTERNAL MEDICINE

## 2022-02-03 NOTE — PATIENT INSTRUCTIONS
Pick a day when you can be home all day to do the urine collection. On the day you start the collection you will DISCARD your first urine of the day, but collect ALL the rest of your urine for that day, including anytime you go to the bathroom that night.   On the second day you DO collect your first urine of day two, [facility]

## 2022-02-03 NOTE — PROGRESS NOTES
Chief Complaint   Patient presents with    Elevated Blood Calcium     pcp and pharmacy verified     History of Present Illness: Dominique Garcia is a 40 y.o. female who I was asked to see in consult by Dr. Ellen Donis, for evaluation of multinodular goiter, hypercalcemia and hyperparathyroidism. In 2019 pt moved from [place] to [place] and went to Dr. Lexi Boyle to establish care. She had an elevated calcium at 10.5 and as part of the work-up she was sent for an [address], which did not show any apparent parathyroid nodules, it did show multiple thyroid nodules. Since that time pt has had multiple Ca levels drawn and they have mostly run in the 9.5-10.7 range. In September 2021 her Ca did spike to 11.4. Her PTH has been in the 67-85 range. Her PTHrP was negative, her TSH has been normal and she has had Vitamin D in the 24-30s range. She had repeat thyroid US in October 2021 as well as a sestimibi scan. The Thyroid US did not show any changes in thyroid nodules, but the sestimibi scan did show evidence of increased activity in the lower right thyroid pole consistent with parathyroid activity. She saw Dr. Homa Ridley in December 2021 and they discussed surgery and pt stated that she wanted to wait and follow up. Pt notes that she sees Dr. Dandy Macario of nephrology. 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 He is concerned that my renal function is declining and that my calcium was high. Prior to 2019 she had never been told about thyroid issues or thyroid nodules in the past.  Pt recalls first being to about the elevated Ca level in 2019, she notes that she had never been told about hypercalcemia prior to her moved to [place] in 2019 and seeing Dr. Lexi Boyle. Pt has no hx of renal stones, but has the irregular renal signs and declining renal function. She denies issues of hematuria, or dysuria.   \"I dealt with a lot of issues of protein in my urine when I was living in [place]\". She denies issue of AMS, confusion or mood swings. She notes that she has chronic Arthritis, but denies any new or worsening joint pains. \"I have have multiple surgeries on my ankles, knees, and feet. 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 No hx of bone fractures and she has never been tested for osteoporosis. She has hx of GERD \"I will get two flares per year, and it waxes and wanes. I always keep yogurt around to treat it. \" She denies taking TUMS or antacids. When she has a flare, she treats with yogurt. She does not take any Calcium supplements \"I was taking Ca and Vitamin D but I got all of these conflicting reports so I stopped taking them all. \"  He is currently taking Vitamin D 1000 units M/W/. For her HTN she is taking Lisinopril. She has never taken HCTZ or Chlorthalidone. Pt has no hx of cancer. Pt was born in [facility], she has never lived outside of the [address] [place] [address] Rd[address]. No known exposure to ionizing radiation. Her mother had hx of hyperparathyroidism and is s/p parathyroidectomy. No known family hx of thyroid issues. Her father had prostate cancer and  from [facility] [address] Past Medical History:   Diagnosis Date    Arthritis     Club foot     had surgical correction    GERD (gastroesophageal reflux disease)     \"mild\"    Hemihypertrophy     Hypertension     Pre-diabetes      Past Surgical History:   Procedure Laterality Date    HX BREAST REDUCTION      HX DILATION AND CURETTAGE  10/2020    HX ORTHOPAEDIC      multiple surgeries to Rt side of her body    HX OTHER SURGICAL  1980s    right club foot surgery, right hip surgery, right knee surgery    HX SPLENECTOMY      spleen laceration  leading to removal of spleen     Current Outpatient Medications   Medication Sig    azelastine (ASTELIN) 137 mcg (0.1 %) nasal spray 1 [place] by Both Nostrils route two (2) times a day.  Use in each nostril as directed    fluticasone propionate (FLONASE) 50 mcg/actuation nasal spray 2 Sprays by Both Nostrils route daily.  lisinopriL (PRINIVIL, ZESTRIL) 10 mg tablet Take 1 Tablet by mouth daily. No current facility-administered medications for this visit. No Known Allergies  Family History   Problem Relation Age of Onset    Diabetes Mother     Hypertension Mother     Other Mother         Hyperparathyroidism and Sarcoidosis    Kidney Disease Mother     Coronary Art Dis Father     Cancer Father         liver & prostate    Heart Disease Father     No Known Problems Sister     Hypertension Maternal Grandmother     Heart Failure Maternal Grandmother     Heart Attack Maternal Grandfather     Diabetes Paternal Grandmother         insulin    No Known Problems Paternal Grandfather      Social History     Socioeconomic History    Marital status: SINGLE     Spouse name: Not on file    Number of children: Not on file    Years of education: Not on file    Highest education level: Not on file   Occupational History    Not on file   Tobacco Use    Smoking status: Light Tobacco Smoker     Types: Cigars    Smokeless tobacco: Never Used    Tobacco comment: cigar on holidays   Vaping Use    Vaping Use: Never used   Substance and Sexual Activity    Alcohol use: Yes     Alcohol/week: 3.0 - 4.0 standard drinks     Types: 3 - 4 Glasses of wine per week    Drug use: Yes     Types: Marijuana     Comment: occasionally    Sexual activity: Not on file   Other Topics Concern    Not on file   Social History Narrative    Not on file     Social Determinants of Health     Financial Resource Strain:     Difficulty of Paying Living Expenses: Not on file   Food Insecurity:     Worried About Running Out of Food in the Last Year: Not on file    Elizabeth of Food in the Last Year: Not on file   Transportation Needs:     Lack of Transportation (Medical): Not on file    Lack of Transportation (Non-Medical):  Not on file   Physical Activity:     Days of Exercise per Week: Not on file    Minutes of Exercise per Session: Not on file   Stress:     Feeling of Stress : Not on file   Social Connections:     Frequency of Communication with Friends and Family: Not on file    Frequency of Social Gatherings with Friends and Family: Not on file    Attends Yazidism Services: Not on file    Active Member of [address] [place] [address] or Organizations: Not on file    Attends Club or Organization Meetings: Not on file    Marital Status: Not on file   Intimate Partner Violence:     Fear of Current or Ex-Partner: Not on file    Emotionally Abused: Not on file    Physically Abused: Not on file    Sexually Abused: Not on file   Housing Stability:     Unable to Pay for Housing in the Last Year: Not on file    Number of Sai in the Last Year: Not on file    Unstable Housing in the Last Year: Not on file     Review of Systems:  - Constitutional Symptoms: no fevers, chills, weight loss  - Eyes: no blurry vision or double vision  - Cardiovascular: no chest pain or palpitations  - Respiratory: no cough or shortness of breath  - Gastrointestinal: no dysphagia or abdominal pain  - Musculoskeletal: no joint pains or weakness  - Integumentary: no rashes  - Neurological: no numbness, tingling, or headaches  - Psychiatric: no depression or anxiety  - Endocrine: no heat or cold intolerance, no polyuria or polydipsia    Physical Examination:  Blood pressure 129/84, pulse 67, height 5' 2\" (1.575 m), weight 141 lb 9.6 oz (64.2 kg).   - General: pleasant, no distress, good eye contact  - HEENT: no exopthalmos, no periorbital edema, no scleral/conjunctival injection, EOMI, no lid lag or stare  - Neck: supple, no thyromegaly, masses, lymph nodes, or carotid bruits, no supraclavicular or dorsocervical fat pads  - Cardiovascular: regular, normal rate, normal S1 and S2, no murmurs/rubs/gallops, 2+ dorsalis pedis pulses bilaterally  - Respiratory: clear to auscultation bilaterally  - Gastrointestinal: soft, nontender, nondistended, no masses, no hepatosplenomegaly  - Musculoskeletal: no proximal muscle weakness in upper or lower extremities  - Integumentary: no acanthosis nigricans, no rashes, no edema,   - Neurological: reflexes 2+ at biceps, no tremor  - Psychiatric: normal mood and affect    Data Reviewed:   Component      Latest Ref Rng & Units 1/18/2022 1/18/2022 1/18/2022 9/29/2021           9:20 AM  9:20 AM  9:20 AM  5:04 AM   Sodium      136 - 145 mmol/L   138    Potassium      3.5 - 5.1 mmol/L   5.0    Chloride      97 - 108 mmol/L   110 (H)    CO2      21 - 32 mmol/L   27    Anion gap      5 - 15 mmol/L   1 (L)    Glucose      65 - 100 mg/dL   89    BUN      6 - 20 MG/DL   20    Creatinine      0.55 - 1.02 MG/DL   1.04 (H)    BUN/Creatinine ratio      12 - 20     19    GFR est AA      >60 ml/min/1.73m2   >60    GFR est non-AA      >60 ml/min/1.73m2   57 (L)    Calcium      8.5 - 10.1 MG/DL  10.1 10.0    Bilirubin, total      0.2 - 1.0 MG/DL   0.3    ALT      12 - 78 U/L   26    AST      15 - 37 U/L   12 (L)    Alk.  phosphatase      45 - 117 U/L   83    Protein, total      6.4 - 8.2 g/dL   7.2    Albumin      3.5 - 5.0 g/dL   3.8    Globulin      2.0 - 4.0 g/dL   3.4    A-G Ratio      1.1 - 2.2     1.1    GLOBULIN, TOTAL      1.5 - 4.5 g/dL       PTH, Intact      18.4 - 88.0 pg/mL  85.8     TSH      0.36 - 3.74 uIU/mL    1.03   PTH-related peptide      pmol/L <2.0        Component      Latest Ref Rng & Units 9/29/2021 9/28/2021 9/27/2021 9/27/2021           5:04 AM  7:50 PM  9:55 AM  9:55 AM   Sodium      136 - 145 mmol/L 138 139     Potassium      3.5 - 5.1 mmol/L 4.6 4.1     Chloride      97 - 108 mmol/L 112 (H) 110 (H)     CO2      21 - 32 mmol/L 22 27     Anion gap      5 - 15 mmol/L 4 (L) 2 (L)     Glucose      65 - 100 mg/dL 108 (H) 144 (H)     BUN      6 - 20 MG/DL 22 (H) 28 (H)     Creatinine      0.55 - 1.02 MG/DL 1.08 (H) 1.33 (H)     BUN/Creatinine ratio      12 - 20   20 21 (H)     GFR est AA      >60 ml/min/1.73m2 >60 53 (L)     GFR est non-AA      >60 ml/min/1.73m2 55 (L) 43 (L)     Calcium      8.5 - 10.1 MG/DL 10.6 (H) 10.4 (H)  11.4 (H)   Bilirubin, total      0.2 - 1.0 MG/DL  0.5     ALT      12 - 78 U/L  31     AST      15 - 37 U/L  14 (L)     Alk. phosphatase      45 - 117 U/L  97     Protein, total      6.4 - 8.2 g/dL  8.4 (H)     Albumin      3.5 - 5.0 g/dL  4.1     Globulin      2.0 - 4.0 g/dL  4.3 (H)     A-G Ratio      1.1 - 2.2    1.0 (L)     GLOBULIN, TOTAL      1.5 - 4.5 g/dL       PTH, Intact      18.4 - 88.0 pg/mL    70.3   VITAMIN D, 25-HYDROXY      30.0 - 100.0 ng/mL       Calcium, ionized      4.5 - 5.6 mg/dL   6.0 (H)      Component      Latest Ref Rng & Units 9/27/2021 2/11/2021 2/11/2021 9/30/2020           9:55 AM 10:40 AM 10:40 AM 12:57 PM   Sodium      136 - 145 mmol/L 137  140 139   Potassium      3.5 - 5.1 mmol/L 4.7  4.6 4.5   Chloride      97 - 108 mmol/L 107  103 105   CO2      21 - 32 mmol/L 24  22 20   Anion gap      5 - 15 mmol/L 6      Glucose      65 - 100 mg/dL 94  88 109 (H)   BUN      6 - 20 MG/DL 26 (H)  18 20   Creatinine      0.55 - 1.02 MG/DL 1.20 (H)  1.17 (H) 1.18 (H)   BUN/Creatinine ratio      12 - 20   22 (H)  15 17   GFR est AA      >60 ml/min/1.73m2 59 (L)  65 65   GFR est non-AA      >60 ml/min/1.73m2 49 (L)  57 (L) 57 (L)   Calcium      8.5 - 10.1 MG/DL 11.1 (H)  10.2 10.4 (H)   Bilirubin, total      0.2 - 1.0 MG/DL   0.3 0.2   ALT      12 - 78 U/L   17 13   AST      15 - 37 U/L   15 14   Alk.  phosphatase      45 - 117 U/L   92 79   Protein, total      6.4 - 8.2 g/dL   7.1 7.1   Albumin      3.5 - 5.0 g/dL   4.4 4.6   Globulin      2.0 - 4.0 g/dL       A-G Ratio      1.1 - 2.2     1.6 1.8   GLOBULIN, TOTAL      1.5 - 4.5 g/dL   2.7 2.5   PTH, Intact      18.4 - 88.0 pg/mL       VITAMIN D, 25-HYDROXY      30.0 - 100.0 ng/mL  24.1 (L)       Component      Latest Ref Rng & Units 8/31/2020 7/11/2019 7/11/2019 7/11/2019          12:02 AM  3:03 PM  3:03 PM  3:03 PM   Sodium      136 - 145 mmol/L 138 144   Potassium      3.5 - 5.1 mmol/L 4.8   4.5   Chloride      97 - 108 mmol/L 109 (H)   106   CO2      21 - 32 mmol/L 30   19 (L)   Anion gap      5 - 15 mmol/L NEG 1      Glucose      65 - 100 mg/dL 103 (H)   93   BUN      6 - 20 MG/DL 20   26 (H)   Creatinine      0.55 - 1.02 MG/DL 1.21 (H)   1.25 (H)   BUN/Creatinine ratio      12 - 20   17   21   GFR est AA      >60 ml/min/1.73m2 59 (L)   61   GFR est non-AA      >60 ml/min/1.73m2 49 (L)   53 (L)   Calcium      8.5 - 10.1 MG/DL 9.5   10.4 (H)   Bilirubin, total      0.2 - 1.0 MG/DL    <0.2   ALT      12 - 78 U/L    29   AST      15 - 37 U/L    22   Alk. phosphatase      45 - 117 U/L    99   Protein, total      6.4 - 8.2 g/dL    7.2   Albumin      3.5 - 5.0 g/dL    4.5   Globulin      2.0 - 4.0 g/dL       A-G Ratio      1.1 - 2.2      1.7   GLOBULIN, TOTAL      1.5 - 4.5 g/dL    2.7   PTH, Intact      18.4 - 88.0 pg/mL  67 (H)     VITAMIN D, 25-HYDROXY      30.0 - 100.0 ng/mL   33.8      Component      Latest Ref Rng & Units 6/28/2019 3/21/2019          12:45 PM  9:09 AM   Sodium      136 - 145 mmol/L 142 142   Potassium      3.5 - 5.1 mmol/L 4.7 4.7   Chloride      97 - 108 mmol/L 104 102   CO2      21 - 32 mmol/L 24 25   Anion gap      5 - 15 mmol/L     Glucose      65 - 100 mg/dL 82 89   BUN      6 - 20 MG/DL 23 16   Creatinine      0.55 - 1.02 MG/DL 1.11 (H) 1.15 (H)   BUN/Creatinine ratio      12 - 20   21 14   GFR est AA      >60 ml/min/1.73m2 71 68   GFR est non-AA      >60 ml/min/1.73m2 61 59 (L)   Calcium      8.5 - 10.1 MG/DL 10.7 (H) 10.5 (H)   EXAM: NM PARATHYROID SCAN     INDICATION: elevated calcium.      COMPARISON: August 1, 2019     CORRELATIVE IMAGING STUDIES: Thyroid/parathyroid ultrasound from 10/15/2021     TRACER: 25 mCi Tc 99m sestamibi.     TECHNIQUE: Imaging of the neck and chest was performed in the anterior  projection following the uneventful intravenous administration of Tc 99m  sestamibi.  Delayed images of the chest and neck were also obtained.     FINDINGS:  The initial images demonstrate physiologic tracer uptake in the salivary glands,  thyroid, and myocardium.      The delayed images demonstrate minimal residual increased tracer activity at the  lower pole of the right thyroid lobe. No other abnormal tracer activity is seen.     IMPRESSION  Minimal increased tracer activity at the level of the lower pole of the right  thyroid lobe on delayed imaging; this could represent a small parathyroid  adenoma, although no correlate was seen on the accompanying ultrasound  Examination. INDICATION: Large thyroid. Elevated calcium.     COMPARISON: Thyroid ultrasound from 8/1/2019. Nuclear parathyroid imaging scan  from 10/15/2021.     FINDINGS: Routine ultrasound imaging of the thyroid gland was performed. The  right thyroid lobe measures 5.6 x 1.9 x 1.4 cm. The left thyroid lobe measures  5.0 x 1.8 x 1.4 cm. The thyroid isthmus measures 2 mm in thickness. A 1.2 x 1.2  x 0.7 cm cystic nodule is seen at the lower pole of the right lobe, previously  1.0 x 0.6 x 0.5 cm. A 1.4 x 1.3 x 1.4 cm mixed solid/cystic nodule is seen at  the lower pole of the right lobe, previously 1.2 x 1.2 x 1.0 cm. Several tiny  cystic nodules are seen in both lobes, measuring up to 4 mm. Cine imaging of the  bilateral neck demonstrates no cervical lymphadenopathy. No parathyroid adenoma  is seen.     IMPRESSION  1. Dominant mixed solid/cystic nodule at the lower pole of the right lobe  demonstrates minimal overall change in size since 2019.  2. Cystic nodules in both lobes are benign in appearance. 3. No new thyroid nodule.     Assessment/Plan:   1. Multinodular goiter (nontoxic)    2. Hyperparathyroidism ([address]    3. Hypercalcemia    1) We discussed at length thyroid nodules and the risk for thyroid cancer. Her nodules were stable in size from 2019 to 2021, she has no high risk for development of thyroid cancer and strong family hx.  I agree with Dr. Yves Terrazas that we could repeat her US in 6-12 months to continue to monitor for change. 2) Pt has had Ca levels in the 9.5-10.7 range since 2019, which did spike to 11.4. Her PTH levels have been in the high normal range, in the face of a high calcium. This pared with the sestimibi scan findings are strong evidence of  hyperparathyroidism as the underlying cause of her hypercalcemia. To evaluate if she would be a surgical candidate (Ca > 11.3, CKD, renal stones, hypercalcuria or osteoporosis) we will start with getting a 24 hour urine for Ca and Cr, a renal panel, Vitamin D, 1,25-OH Vitamin D and PTH level as well as a DXA scan. We also discussed that hyperparathyroidism and hypercalcemia can be damaging to the kidneys and with her hx of renal issues she would be at higher risk for kidney damage from hypercalcemia. Once we get the results of the above tests we can discuss treatment options. 3) See #2. Pt voices understanding and agreement with the plan. Pain noted and pt was recommended to call her PCP for further evaluation and treatment, as needed    RTC 4 months      Patient Instructions   Pick a day when you can be home all day to do the urine collection. On the day you start the collection you will DISCARD your first urine of the day, but collect ALL the rest of your urine for that day, including anytime you go to the bathroom that night. On the second day you DO collect your first urine of day two, and then bring the urine to [facility].      Follow-up and Dispositions    · Return in about 4 months (around 6/3/2022). Copy sent to:  Saige Farris, [address] [place] [address] and [place]

## 2022-02-03 NOTE — LETTER
2/3/2022    Patient: Lisa Avila   YOB: 1975   Date of Visit: 2/3/2022     Meri Alarcon MD  [address]  [facility] [address]  [facility]    Dear Meri Alarcon MD,      Thank you for referring Ms. Maximino Hamman to [facility] for evaluation. My notes for this consultation are attached. If you have questions, please do not hesitate to call me. I look forward to following your patient along with you.       Sincerely,    Jeet Lovett MD

## 2022-02-09 DIAGNOSIS — H93.8X3 CONGESTION OF BOTH EARS: ICD-10-CM

## 2022-02-09 DIAGNOSIS — J01.01 ACUTE RECURRENT MAXILLARY SINUSITIS: ICD-10-CM

## 2022-02-09 LAB
1,25(OH)2D SERPL-MCNC: 65.4 PG/ML (ref 19.9–79.3)
25(OH)D3+25(OH)D2 SERPL-MCNC: 30.5 NG/ML (ref 30–100)
ALBUMIN SERPL-MCNC: 4.4 G/DL (ref 3.8–4.8)
BUN SERPL-MCNC: 20 MG/DL (ref 6–24)
BUN/CREAT SERPL: 19 (ref 9–23)
CALCIUM 24H UR-MCNC: 1.8 MG/DL
CALCIUM 24H UR-MRATE: 20 MG/24 HR (ref 0–320)
CALCIUM SERPL-MCNC: 10.3 MG/DL (ref 8.7–10.2)
CHLORIDE SERPL-SCNC: 105 MMOL/L (ref 96–106)
CO2 SERPL-SCNC: 23 MMOL/L (ref 20–29)
CREAT 24H UR-MRATE: 1031 MG/24 HR (ref 800–1800)
CREAT SERPL-MCNC: 1.06 MG/DL (ref 0.57–1)
CREAT UR-MCNC: 93.7 MG/DL
GLUCOSE SERPL-MCNC: 84 MG/DL (ref 65–99)
PHOSPHATE SERPL-MCNC: 3.8 MG/DL (ref 3–4.3)
POTASSIUM SERPL-SCNC: 4.6 MMOL/L (ref 3.5–5.2)
PTH-INTACT SERPL-MCNC: 83 PG/ML (ref 15–65)
SODIUM SERPL-SCNC: 142 MMOL/L (ref 134–144)

## 2022-02-09 RX ORDER — FLUTICASONE PROPIONATE 50 MCG
SPRAY, SUSPENSION (ML) NASAL
Qty: 1 EACH | Refills: 1 | Status: SHIPPED | OUTPATIENT
Start: 2022-02-09 | End: 2022-03-21

## 2022-02-21 RX ORDER — CETIRIZINE HCL 10 MG
10 TABLET ORAL
Qty: 30 TABLET | Refills: 1 | Status: SHIPPED | OUTPATIENT
Start: 2022-02-21 | End: 2022-03-21

## 2022-03-17 ENCOUNTER — HOSPITAL ENCOUNTER (OUTPATIENT)
Dept: MAMMOGRAPHY | Age: 47
Discharge: HOME | End: 2022-03-17
Attending: INTERNAL MEDICINE
Payer: COMMERCIAL

## 2022-03-17 DIAGNOSIS — E21.3 HYPERPARATHYROIDISM (HCC): ICD-10-CM

## 2022-03-17 DIAGNOSIS — E83.52 HYPERCALCEMIA: ICD-10-CM

## 2022-03-17 PROCEDURE — 77080 DXA BONE DENSITY AXIAL: CPT

## 2022-03-17 NOTE — PROGRESS NOTES
Osteopenia noted no osteoporosis  Recommend regular exercise with weight bearing  Will defer calcium and vitamin D intake to Dr Celia Leonardo

## 2022-03-19 PROBLEM — E21.0 PRIMARY HYPERPARATHYROIDISM (HCC): Status: ACTIVE | Noted: 2021-12-04

## 2022-03-19 PROBLEM — N18.31 CKD STAGE G3A/A2, GFR 45-59 AND ALBUMIN CREATININE RATIO 30-299 MG/G (HCC): Status: ACTIVE | Noted: 2021-02-05

## 2022-03-19 PROBLEM — E04.1 THYROID NODULE: Status: ACTIVE | Noted: 2021-12-04

## 2022-03-19 PROBLEM — G45.9 TIA (TRANSIENT ISCHEMIC ATTACK): Status: ACTIVE | Noted: 2021-09-28

## 2022-03-21 DIAGNOSIS — J01.01 ACUTE RECURRENT MAXILLARY SINUSITIS: ICD-10-CM

## 2022-03-21 DIAGNOSIS — H93.8X3 CONGESTION OF BOTH EARS: ICD-10-CM

## 2022-03-21 RX ORDER — CETIRIZINE HCL 10 MG
10 TABLET ORAL
Qty: 30 TABLET | Refills: 1 | Status: SHIPPED | OUTPATIENT
Start: 2022-03-21 | End: 2022-04-25

## 2022-03-21 RX ORDER — FLUTICASONE PROPIONATE 50 MCG
SPRAY, SUSPENSION (ML) NASAL
Qty: 1 EACH | Refills: 1 | Status: SHIPPED | OUTPATIENT
Start: 2022-03-21 | End: 2022-04-25

## 2022-04-23 DIAGNOSIS — J01.01 ACUTE RECURRENT MAXILLARY SINUSITIS: ICD-10-CM

## 2022-04-23 DIAGNOSIS — H93.8X3 CONGESTION OF BOTH EARS: ICD-10-CM

## 2022-04-25 RX ORDER — FLUTICASONE PROPIONATE 50 MCG
SPRAY, SUSPENSION (ML) NASAL
Qty: 1 EACH | Refills: 1 | Status: SHIPPED | OUTPATIENT
Start: 2022-04-25

## 2022-04-25 RX ORDER — CETIRIZINE HCL 10 MG
10 TABLET ORAL
Qty: 30 TABLET | Refills: 1 | Status: SHIPPED | OUTPATIENT
Start: 2022-04-25

## 2022-06-27 ENCOUNTER — OFFICE VISIT (OUTPATIENT)
Dept: ENDOCRINOLOGY | Age: 47
End: 2022-06-27
Payer: COMMERCIAL

## 2022-06-27 VITALS
HEART RATE: 75 BPM | BODY MASS INDEX: 26.17 KG/M2 | DIASTOLIC BLOOD PRESSURE: 77 MMHG | SYSTOLIC BLOOD PRESSURE: 116 MMHG | WEIGHT: 142.2 LBS | HEIGHT: 62 IN

## 2022-06-27 DIAGNOSIS — E04.2 MULTINODULAR GOITER (NONTOXIC): Primary | ICD-10-CM

## 2022-06-27 DIAGNOSIS — E21.3 HYPERPARATHYROIDISM (HCC): ICD-10-CM

## 2022-06-27 PROCEDURE — 99214 OFFICE O/P EST MOD 30 MIN: CPT | Performed by: INTERNAL MEDICINE

## 2022-06-27 NOTE — PROGRESS NOTES
Chief Complaint   Patient presents with    Thyroid Problem     pcp and pharmacy verified    Elevated Blood Calcium     History of Present Illness: Sujatha Cheng is a 40 y.o. female here for follow up of multinodular goiter, hypercalcemia and hyperparathyroidism. In 2019 pt moved from [place] to [place] and went to Dr. Crow Watt to establish care. She had an elevated calcium at 10.5 and as part of the work-up she was sent for an [address], which did not show any apparent parathyroid nodules, it did show multiple thyroid nodules. Since that time pt has had multiple Ca levels drawn and they have mostly run in the 9.5-10.7 range. In September 2021 her Ca did spike to 11.4. Her PTH has been in the 67-85 range. Her PTHrP was negative, her TSH has been normal and she has had Vitamin D in the 24-30s range. She had repeat thyroid US in October 2021 as well as a sestimibi scan. The Thyroid US did not show any changes in thyroid nodules, but the sestimibi scan did show evidence of increased activity in the lower right thyroid pole consistent with parathyroid activity. She saw Dr. Jessy Gauthier in December 2021 and they discussed surgery and pt stated that she wanted to wait and follow up. Pt notes that she sees Dr. Leonardo Tam of nephrology. 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 He is concerned that my renal function is declining and that my calcium was high. At our initial visit in February 2022 her Ca was 10.3 with PTH of 83, her Vitamin D was 30.5, her 1,25-OH Vitamin D was 65.4. Her 24 hour urine calcium was 20. Her DXA scan showed osteopenia, but not Osteoporosis. Her eGFR was 73. She saw Dr. Leonardo Tam of nephrology since our last visit. \"He said things were looking better and would just continue to monitor. Pt denies any recent illnesses, injuries or hospitalizations. She denies issues of renal stones, hematuria, or dysuria.   \"I dealt with a lot of issues of protein in my urine when I was living in [place]\". She denies issue of AMS, confusion or mood swings. She notes that she has chronic Arthritis, but denies any new or worsening joint pains. \"I have have multiple surgeries on my ankles, knees, and feet. 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 \"I think it is time for me to get a hip injection soon. \"    She has hx of GERD \"I will get two flares per year, and it waxes and wanes. I always keep yogurt around to treat it. \" She denies taking TUMS or antacids. When she has a flare, she treats with yogurt. She does not take any Calcium supplements   She is currently taking Vitamin D 1000 units every day. For her HTN she is taking Lisinopril. She has never taken HCTZ or Chlorthalidone. Current Outpatient Medications   Medication Sig    cetirizine (ZYRTEC) 10 mg tablet TAKE 1 TABLET BY MOUTH NIGHTLY    fluticasone propionate (FLONASE) 50 mcg/actuation nasal spray SPRAY 2 SPRAYS INTO EACH NOSTRIL EVERY DAY    azelastine (ASTELIN) 137 mcg (0.1 %) nasal spray 1 [place] by Both Nostrils route two (2) times a day. Use in each nostril as directed    lisinopriL (PRINIVIL, ZESTRIL) 10 mg tablet Take 1 Tablet by mouth daily. No current facility-administered medications for this visit. No Known Allergies  Review of Systems:  - Cardiovascular: no chest pain  - Neurological: no tremors  - Integumentary: skin is normal    Physical Examination:  Blood pressure 116/77, pulse 75, height 5' 2\" (1.575 m), weight 142 lb 3.2 oz (64.5 kg). - General: pleasant, no distress, good eye contact   - Neck: no thyromegaly or thyroid bruits  - Cardiovascular: regular, normal rate, nl s1 and s2, no m/r/g   - Integumentary: skin is normal, no edema  - Neurological: reflexes 2+ at biceps, no tremors  - Psychiatric: normal mood and affect    Data Reviewed:   - none new for review    Assessment/Plan:   1) Multinodular Goiter > We discussed at length thyroid nodules and the risk for thyroid cancer. Her nodules were stable in size from 2019 to 2021, she has no high risk for development of thyroid cancer and strong family hx. I will order a repeat thyroid US to look for any change from October 2021. 2) Hyperparathyroidism > She has elevated PTH, but her Ca is less that 11.4, she has no renal stones or sequellae from the hyperparathyroidism. She DXA in March 2022 showed osteopenia, but no osteoporosis. She is not interested is going forward with surgery at this time and we agreed to continue to monitor her Ca, PTH and renal function. 3) Hypovitaminosis D > Pt to continue the Vitamin D and test her vitamin D level today. Pt to continue the Vitamin D 1000 units per day. Pt voices understanding and agreement with the plan.   Pain noted and pt was recommended to call her PCP for further evaluation and treatment, as needed    RTC 6 months    Copy sent to:  Dr. Lilo Best

## 2022-06-27 NOTE — LETTER
6/27/2022    Patient: Isabel Clements   YOB: 1975   Date of Visit: 6/27/2022     Justin Pinzon MD  [address] [facility] [address]  [facility]  [facility]    Dear Justin Pinzon MD,      Thank you for referring Ms. Reggie Messina to [facility] for evaluation. My notes for this consultation are attached. If you have questions, please do not hesitate to call me. I look forward to following your patient along with you.       Sincerely,    Brooke Oneal MD

## 2022-07-07 ENCOUNTER — HOSPITAL ENCOUNTER (OUTPATIENT)
Dept: ULTRASOUND IMAGING | Age: 47
Discharge: HOME | End: 2022-07-07
Attending: INTERNAL MEDICINE
Payer: COMMERCIAL

## 2022-07-07 DIAGNOSIS — E04.2 MULTINODULAR GOITER (NONTOXIC): ICD-10-CM

## 2022-07-07 PROCEDURE — 76536 US EXAM OF HEAD AND NECK: CPT

## 2022-07-28 DIAGNOSIS — I10 ESSENTIAL HYPERTENSION: ICD-10-CM

## 2022-07-28 RX ORDER — LISINOPRIL 10 MG/1
10 TABLET ORAL DAILY
Qty: 90 TABLET | Refills: 2 | Status: SHIPPED | OUTPATIENT
Start: 2022-07-28

## 2022-07-28 NOTE — TELEPHONE ENCOUNTER
----- Message from Nesha Campbell sent at 7/27/2022  4:12 PM EDT -----  Regarding: BP Refill  Hi Dr Zafar Ahmadi,     My pharmacy reached out to me to let me know that they are waiting for approval for my Lisinopril refill. Please let me know if there is anything I need to do on my end. And if you are able, could this be for 90 days vs 30 days.     All the best,   [employer]

## 2022-07-28 NOTE — TELEPHONE ENCOUNTER
Future Appointments:  Future Appointments   Date Time Provider Enedelia Anguiano   12/29/2022  8:30 AM Giancarlo Woodall MD RDE  BS AMB        Last Appointment With Me:  Visit date not found     Requested Prescriptions     Pending Prescriptions Disp Refills    lisinopriL (PRINIVIL, ZESTRIL) 10 mg tablet 90 Tablet 2     Sig: Take 1 Tablet by mouth in the morning.

## 2022-08-16 ENCOUNTER — TRANSCRIBE ORDER (OUTPATIENT)
Dept: SCHEDULING | Age: 47
End: 2022-08-16

## 2022-08-16 DIAGNOSIS — Z12.31 VISIT FOR SCREENING MAMMOGRAM: Primary | ICD-10-CM

## 2022-10-31 ENCOUNTER — TRANSCRIBE ORDER (OUTPATIENT)
Dept: SCHEDULING | Age: 47
End: 2022-10-31

## 2022-10-31 DIAGNOSIS — Z98.890 S/P RIGHT KNEE ARTHROSCOPY: ICD-10-CM

## 2022-10-31 DIAGNOSIS — M94.261 CHONDROMALACIA, RIGHT KNEE: Primary | ICD-10-CM

## 2022-10-31 DIAGNOSIS — M25.561 ACUTE PAIN OF RIGHT KNEE: ICD-10-CM

## 2022-11-10 ENCOUNTER — HOSPITAL ENCOUNTER (OUTPATIENT)
Dept: MRI IMAGING | Age: 47
Discharge: HOME | End: 2022-11-10
Attending: FAMILY MEDICINE
Payer: COMMERCIAL

## 2022-11-10 DIAGNOSIS — M25.561 ACUTE PAIN OF RIGHT KNEE: ICD-10-CM

## 2022-11-10 DIAGNOSIS — M94.261 CHONDROMALACIA, RIGHT KNEE: ICD-10-CM

## 2022-11-10 DIAGNOSIS — Z98.890 S/P RIGHT KNEE ARTHROSCOPY: ICD-10-CM

## 2022-11-10 PROCEDURE — 73721 MRI JNT OF LWR EXTRE W/O DYE: CPT

## 2023-01-20 ENCOUNTER — OFFICE VISIT (OUTPATIENT)
Dept: ENDOCRINOLOGY | Age: 48
End: 2023-01-20
Payer: COMMERCIAL

## 2023-01-20 VITALS
SYSTOLIC BLOOD PRESSURE: 120 MMHG | DIASTOLIC BLOOD PRESSURE: 82 MMHG | BODY MASS INDEX: 25.98 KG/M2 | WEIGHT: 141.2 LBS | HEIGHT: 62 IN | HEART RATE: 63 BPM

## 2023-01-20 DIAGNOSIS — E55.9 HYPOVITAMINOSIS D: ICD-10-CM

## 2023-01-20 DIAGNOSIS — E04.2 MULTINODULAR GOITER (NONTOXIC): Primary | ICD-10-CM

## 2023-01-20 DIAGNOSIS — E21.3 HYPERPARATHYROIDISM (HCC): ICD-10-CM

## 2023-01-20 RX ORDER — DIAPER,BRIEF,INFANT-TODD,DISP
EACH MISCELLANEOUS
COMMUNITY

## 2023-01-20 RX ORDER — GLUCOSAMINE SULFATE 1500 MG
POWDER IN PACKET (EA) ORAL
COMMUNITY

## 2023-01-20 NOTE — LETTER
1/20/2023    Patient: Matteo Flores   YOB: 1975   Date of Visit: 1/20/2023     Tania Mas MD  [address]  [address]    Dear Tania Mas MD,      Thank you for referring Ms. Rachel Nielsen to [facility] for evaluation. My notes for this consultation are attached. If you have questions, please do not hesitate to call me. I look forward to following your patient along with you.       Sincerely,    Sundeep Hernandez MD

## 2023-01-20 NOTE — PROGRESS NOTES
Chief Complaint   Patient presents with    Elevated Blood Calcium     Pcp and pharmacy verified       History of Present Illness: Brenda Tolliver is a 56 y.o. female here for follow up of multinodular goiter, hypercalcemia and hyperparathyroidism. In 2019 pt moved from [place] to [place] and went to Dr. Toby Doe to establish care. She had an elevated calcium at 10.5 and as part of the work-up she was sent for an [address], which did not show any apparent parathyroid nodules, it did show multiple thyroid nodules. Since that time pt has had multiple Ca levels drawn and they have mostly run in the 9.5-10.7 range. In September 2021 her Ca did spike to 11.4. Her PTH has been in the 67-85 range. Her PTHrP was negative, her TSH has been normal and she has had Vitamin D in the 24-30s range. She had repeat thyroid US in October 2021 as well as a sestimibi scan. The Thyroid US did not show any changes in thyroid nodules, but the sestimibi scan did show evidence of increased activity in the lower right thyroid pole consistent with parathyroid activity. She saw Dr. General Medley in December 2021 and they discussed surgery and pt stated that she wanted to wait and follow up. Pt notes that she sees Dr. Merril Gilford of nephrology. 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 He is concerned that my renal function is declining and that my calcium was high. At our initial visit in February 2022 her Ca was 10.3 with PTH of 83, her Vitamin D was 30.5, her 1,25-OH Vitamin D was 65.4. Her 24 hour urine calcium was 20. Her DXA scan showed osteopenia, but not Osteoporosis. Her eGFR was 73. At our last visit in July 2022 her Ca was 10.6 with PTH of 61 and Vitamin D of 39.2. Her Thyroid US in July 2022 was unchanged from October 2021. Pt denies any recent illnesses, injuries or hospitalizations. \"I am having some knee issues. I had a hairline fracture in the knee in 2020.  I had that repaired, but I am having reoccurring pain issues and now I am getting cortisone injections. I suspect replacement is in my future, but we are trying to hold it off as long as possible. \"    She denies issues of renal stones, hematuria, or dysuria. \"I dealt with a lot of issues of protein in my urine when I was living in [place]\". She denies issue of AMS, confusion or mood swings. She notes that she has chronic Arthritis, but denies any new or worsening joint pains. \"I have have multiple surgeries on my ankles, knees, and feet. 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 She saw Dr. Theresa Silva of nephrology in November 2022. \"He said things were looking better and would just continue to monitor. She has hx of GERD \"I will get two flares per year, and it waxes and wanes. I always keep yogurt around to treat it. \" She denies taking TUMS or antacids. When she has a flare, she treats with yogurt. She does not take any Calcium supplements   She is currently taking Vitamin D 1000 units every day. For her HTN she is taking Lisinopril. She has never taken HCTZ or Chlorthalidone. Pt notes that her mother just had surgery for a goiter and she thinks it had thyroid cancer. Current Outpatient Medications   Medication Sig    cholecalciferol (VITAMIN D3) 25 mcg (1,000 unit) cap Vitamin D3 25 mcg (1,000 unit) capsule   Take 1 capsule every day by oral route. biotin 10,000 mcg cap biotin 10,000 mcg capsule   Take 1 capsule every day by oral route. TURMERIC PO Take  by mouth.    lisinopriL (PRINIVIL, ZESTRIL) 10 mg tablet Take 1 Tablet by mouth in the morning. cetirizine (ZYRTEC) 10 mg tablet TAKE 1 TABLET BY MOUTH NIGHTLY    fluticasone propionate (FLONASE) 50 mcg/actuation nasal spray SPRAY 2 SPRAYS INTO EACH NOSTRIL EVERY DAY    azelastine (ASTELIN) 137 mcg (0.1 %) nasal spray 1 [place] by Both Nostrils route two (2) times a day.  Use in each nostril as directed     No current facility-administered medications for this visit. No Known Allergies  Review of Systems:  - Cardiovascular: no chest pain  - Neurological: no tremors  - Integumentary: skin is normal    Physical Examination:  Blood pressure 120/82, pulse 63, height 5' 2\" (1.575 m), weight 141 lb 3.2 oz (64 kg). General: pleasant, no distress, good eye contact   Neck: no thyromegaly or thyroid bruits  Cardiovascular: regular, normal rate, nl s1 and s2, no m/r/g   Integumentary: skin is normal, no edema  Neurological: reflexes 2+ at biceps, no tremors  Psychiatric: normal mood and affect    Data Reviewed:   Narrative & Impression   EXAM: US THYROID/PARATHYROID/SOFT TISS      INDICATION: Nodular goiter. COMPARISON: 10/15/2021. TECHNIQUE: Real-time sonography of the thyroid gland was performed with a high  frequency linear transducer. Multiple static images were obtained. FINDINGS:  The thyroid is homogeneous in echotexture with the following exceptions:     Right mid thyroid 1.4 x 1.4 x 1.5 cm partially solid partially cystic avascular  nodule (previously 1.4 x 1.4 x 1.3 cm) and 1.3 x 1.3 x 0.9 cm anechoic cyst  (previously 1.2 x 1.2 x 0.7 cm). Left thyroid 3 mm cyst  Isthmic 4.0 x 2.4 x 4.3 mm hypoechoic homogeneous nodule (previously measuring  4.2 x 2.3 x 4.7 mm. In the left lateral neck, there is a 1.0 x 0.9 x 0.4 cm lymph node with a  homogeneous cortex. The right lobe measures 5.7 x 1.9 x 2.1 cm (previously 2 5.6 x 1.9 x 1.4 cm) and  the left lobe measures 5.5 x 1.6 x 1.5 cm (previously 5.0 x 1.8 x 1.4 cm). The  isthmus measures 0.4 cm. (Previously 0.4 cm). IMPRESSION  Stable multinodular goiter       Assessment/Plan:   1) Multinodular Goiter > We discussed at length thyroid nodules and the risk for thyroid cancer. Her nodules were stable in size from 2019 to 2021 and her last Thyroid US in July 2022 was unchanged from October 2021.  She has no high risk for development of thyroid cancer, but her mother just has thyroidectomy for goiter and it showed thyroid cancer. She will need a repeat thyroid US in July 2023. 2) Hyperparathyroidism > She has a hx of elevated PTH, but her Ca has consistently been under 11.4. She has no hx of renal stones or sequellae from the hyperparathyroidism. Her DXA in March 2022 showed osteopenia, but no osteoporosis. She is not interested is going forward with surgery at this time and we agreed to continue to monitor her Ca, PTH and renal function. 3) Hypovitaminosis D > Pt to continue the Vitamin D and test her vitamin D level today. Pt to continue the Vitamin D 1000 units per day. Pt voices understanding and agreement with the plan.   Pain noted and pt was recommended to call her PCP for further evaluation and treatment, as needed    RTC 6 months    Copy sent to:  Dr. Shiv Stroud

## 2023-01-23 DIAGNOSIS — E55.9 HYPOVITAMINOSIS D: ICD-10-CM

## 2023-01-23 DIAGNOSIS — E04.2 MULTINODULAR GOITER (NONTOXIC): Primary | ICD-10-CM

## 2023-01-23 DIAGNOSIS — E21.3 HYPERPARATHYROIDISM (HCC): ICD-10-CM

## 2023-02-09 ENCOUNTER — HOSPITAL ENCOUNTER (OUTPATIENT)
Age: 48
Setting detail: OUTPATIENT SURGERY
Discharge: HOME | End: 2023-02-09
Attending: SPECIALIST | Admitting: SPECIALIST
Payer: COMMERCIAL

## 2023-02-09 ENCOUNTER — ANESTHESIA EVENT (OUTPATIENT)
Dept: ENDOSCOPY | Age: 48
End: 2023-02-09
Payer: COMMERCIAL

## 2023-02-09 ENCOUNTER — ANESTHESIA (OUTPATIENT)
Dept: ENDOSCOPY | Age: 48
End: 2023-02-09
Payer: COMMERCIAL

## 2023-02-09 VITALS
DIASTOLIC BLOOD PRESSURE: 86 MMHG | TEMPERATURE: 98.7 F | OXYGEN SATURATION: 99 % | WEIGHT: 138 LBS | HEIGHT: 62 IN | BODY MASS INDEX: 25.4 KG/M2 | RESPIRATION RATE: 15 BRPM | HEART RATE: 82 BPM | SYSTOLIC BLOOD PRESSURE: 132 MMHG

## 2023-02-09 PROCEDURE — 74011250636 HC RX REV CODE- 250/636: Performed by: NURSE ANESTHETIST, CERTIFIED REGISTERED

## 2023-02-09 PROCEDURE — 2709999900 HC NON-CHARGEABLE SUPPLY: Performed by: SPECIALIST

## 2023-02-09 PROCEDURE — 76060000032 HC ANESTHESIA 0.5 TO 1 HR: Performed by: SPECIALIST

## 2023-02-09 PROCEDURE — 74011000250 HC RX REV CODE- 250: Performed by: NURSE ANESTHETIST, CERTIFIED REGISTERED

## 2023-02-09 PROCEDURE — 76040000007: Performed by: SPECIALIST

## 2023-02-09 RX ORDER — SODIUM CHLORIDE 0.9 % (FLUSH) 0.9 %
5-40 SYRINGE (ML) INJECTION EVERY 8 HOURS
Status: DISCONTINUED | OUTPATIENT
Start: 2023-02-09 | End: 2023-02-09 | Stop reason: HOSPADM

## 2023-02-09 RX ORDER — SODIUM CHLORIDE 0.9 % (FLUSH) 0.9 %
5-40 SYRINGE (ML) INJECTION AS NEEDED
Status: DISCONTINUED | OUTPATIENT
Start: 2023-02-09 | End: 2023-02-09 | Stop reason: HOSPADM

## 2023-02-09 RX ORDER — SODIUM CHLORIDE 9 MG/ML
INJECTION, SOLUTION INTRAVENOUS
Status: DISCONTINUED | OUTPATIENT
Start: 2023-02-09 | End: 2023-02-09 | Stop reason: HOSPADM

## 2023-02-09 RX ORDER — NALOXONE HYDROCHLORIDE 0.4 MG/ML
0.4 INJECTION, SOLUTION INTRAMUSCULAR; INTRAVENOUS; SUBCUTANEOUS
Status: DISCONTINUED | OUTPATIENT
Start: 2023-02-09 | End: 2023-02-09 | Stop reason: HOSPADM

## 2023-02-09 RX ORDER — LIDOCAINE HYDROCHLORIDE 20 MG/ML
INJECTION, SOLUTION EPIDURAL; INFILTRATION; INTRACAUDAL; PERINEURAL AS NEEDED
Status: DISCONTINUED | OUTPATIENT
Start: 2023-02-09 | End: 2023-02-09 | Stop reason: HOSPADM

## 2023-02-09 RX ORDER — FLUMAZENIL 0.1 MG/ML
0.2 INJECTION INTRAVENOUS
Status: DISCONTINUED | OUTPATIENT
Start: 2023-02-09 | End: 2023-02-09 | Stop reason: HOSPADM

## 2023-02-09 RX ORDER — EPINEPHRINE 0.1 MG/ML
1 INJECTION INTRACARDIAC; INTRAVENOUS
Status: DISCONTINUED | OUTPATIENT
Start: 2023-02-09 | End: 2023-02-09 | Stop reason: HOSPADM

## 2023-02-09 RX ORDER — PROPOFOL 10 MG/ML
INJECTION, EMULSION INTRAVENOUS AS NEEDED
Status: DISCONTINUED | OUTPATIENT
Start: 2023-02-09 | End: 2023-02-09 | Stop reason: HOSPADM

## 2023-02-09 RX ORDER — DEXTROMETHORPHAN/PSEUDOEPHED 2.5-7.5/.8
1.2 DROPS ORAL
Status: DISCONTINUED | OUTPATIENT
Start: 2023-02-09 | End: 2023-02-09 | Stop reason: HOSPADM

## 2023-02-09 RX ORDER — SODIUM CHLORIDE 9 MG/ML
50 INJECTION, SOLUTION INTRAVENOUS CONTINUOUS
Status: DISCONTINUED | OUTPATIENT
Start: 2023-02-09 | End: 2023-02-09 | Stop reason: HOSPADM

## 2023-02-09 RX ORDER — ATROPINE SULFATE 0.1 MG/ML
0.5 INJECTION INTRAVENOUS
Status: DISCONTINUED | OUTPATIENT
Start: 2023-02-09 | End: 2023-02-09 | Stop reason: HOSPADM

## 2023-02-09 RX ADMIN — PROPOFOL 50 MG: 10 INJECTION, EMULSION INTRAVENOUS at 13:47

## 2023-02-09 RX ADMIN — PROPOFOL 50 MG: 10 INJECTION, EMULSION INTRAVENOUS at 13:31

## 2023-02-09 RX ADMIN — PROPOFOL 50 MG: 10 INJECTION, EMULSION INTRAVENOUS at 13:43

## 2023-02-09 RX ADMIN — PROPOFOL 50 MG: 10 INJECTION, EMULSION INTRAVENOUS at 13:34

## 2023-02-09 RX ADMIN — PROPOFOL 50 MG: 10 INJECTION, EMULSION INTRAVENOUS at 13:25

## 2023-02-09 RX ADMIN — PROPOFOL 50 MG: 10 INJECTION, EMULSION INTRAVENOUS at 13:40

## 2023-02-09 RX ADMIN — LIDOCAINE HYDROCHLORIDE 50 MG: 20 INJECTION, SOLUTION EPIDURAL; INFILTRATION; INTRACAUDAL; PERINEURAL at 13:17

## 2023-02-09 RX ADMIN — SODIUM CHLORIDE: 900 INJECTION, SOLUTION INTRAVENOUS at 12:54

## 2023-02-09 RX ADMIN — PROPOFOL 50 MG: 10 INJECTION, EMULSION INTRAVENOUS at 13:19

## 2023-02-09 RX ADMIN — PROPOFOL 50 MG: 10 INJECTION, EMULSION INTRAVENOUS at 13:22

## 2023-02-09 RX ADMIN — PROPOFOL 100 MG: 10 INJECTION, EMULSION INTRAVENOUS at 13:17

## 2023-02-09 RX ADMIN — PROPOFOL 50 MG: 10 INJECTION, EMULSION INTRAVENOUS at 13:37

## 2023-02-09 RX ADMIN — PROPOFOL 50 MG: 10 INJECTION, EMULSION INTRAVENOUS at 13:28

## 2023-02-09 NOTE — ANESTHESIA POSTPROCEDURE EVALUATION
Post-Anesthesia Evaluation and Assessment    Patient: John Fregoso MRN: [id number]  SSN: [id number]    YOB: 1975  Age: 56 y.o. Sex: female      I have evaluated the patient and they are stable and ready for discharge from the PACU. Cardiovascular Function/Vital Signs  Visit Vitals  /86   Pulse 82   Temp 37.1 °C (98.7 °F)   Resp 15   Ht 5' 2\" (1.575 m)   Wt 62.6 kg (138 lb)   SpO2 99%   Breastfeeding No   BMI 25.24 kg/m²       Patient is status post MAC anesthesia for Procedure(s):  COLONOSCOPY. Nausea/Vomiting: None    Postoperative hydration reviewed and adequate. Pain:  Pain Scale 1: Numeric (0 - 10) (02/09/23 1405)  Pain Intensity 1: 6 (02/09/23 1405)   Managed    Neurological Status: At baseline    Mental Status, Level of Consciousness: Alert and  oriented to person, place, and time    Pulmonary Status:   O2 Device: None (Room air) (02/09/23 1410)   Adequate oxygenation and airway patent    Complications related to anesthesia: None    Post-anesthesia assessment completed.  No concerns    Signed By: Renetta Perez MD     February 9, 2023

## 2023-02-09 NOTE — DISCHARGE INSTRUCTIONS
Arcadio Salgado  [id number]  1975    COLON DISCHARGE INSTRUCTIONS  Discomfort:  Redness at IV site- apply warm compress to area; if redness or soreness persist- contact your physician  There may be a slight amount of blood passed from the rectum  Gaseous discomfort- walking, belching will help relieve any discomfort    DIET:   High fiber diet. - however -  remember your colon is empty and a heavy meal will produce gas. Avoid these foods:  vegetables, fried / greasy foods, carbonated drinks for today. You may not drink alcoholic beverages for at least 12 hours    MEDICATIONS:   Regarding Aspirin or Nonsteroidal medications, please see below. ACTIVITY:  You may resume your normal daily activities it is recommended that you spend the remainder of the day resting -  avoid any strenuous activity. You may not operate a vehicle for 12 hours  You may not engage in an occupation involving machinery or appliances for rest of today  Avoid making any critical decisions for at least 24 hour    CALL M.D. ANY SIGN OF:  Increasing pain, nausea, vomiting  Abdominal distension (swelling)  New increased bleeding (oral or rectal)  Fever (chills)  Pain in chest area  Bloody discharge from nose or mouth  Shortness of breath    You may  take any Advil, Aspirin, Ibuprofen, Motrin, Aleve, or Goodys for 10 days, ONLY  Tylenol as needed for pain.     Post procedure diagnosis: 1. - normal colon    Post-procedure recommendations: colon in 10 years    Follow-up Instructions:   Call Dr. Keila Dexter  Results of procedure / biopsy in 10 days if biopsies were done  Telephone #  [phone]

## 2023-02-09 NOTE — H&P
Colonoscopy History and Physical      The patient was seen and examined. Date of last colonoscopy: no, Polyps  No      The airway was assessed and documented. The problem list, past medical history, and medications were reviewed. Patient Active Problem List   Diagnosis Code    Hypertension I10    CKD stage G3a/A2, GFR 45-59 and albumin creatinine ratio  mg/g (HCC) N18.31    TIA (transient ischemic attack) G45.9    Thyroid nodule E04.1    Primary hyperparathyroidism (HCC) E21.0     Social History     Socioeconomic History    Marital status: SINGLE     Spouse name: Not on file    Number of children: Not on file    Years of education: Not on file    Highest education level: Not on file   Occupational History    Not on file   Tobacco Use    Smoking status: Light Smoker     Types: Cigars    Smokeless tobacco: Never    Tobacco comments:     ! cigar two times per year   Vaping Use    Vaping Use: Never used   Substance and Sexual Activity    Alcohol use: Yes     Alcohol/week: 3.0 - 4.0 standard drinks     Types: 3 - 4 Glasses of wine per week    Drug use: Yes     Types: Marijuana     Comment: occasionally    Sexual activity: Not on file   Other Topics Concern    Not on file   Social History Narrative    Not on file     Social Determinants of Health     Financial Resource Strain: Not on file   Food Insecurity: Not on file   Transportation Needs: Not on file   Physical Activity: Not on file   Stress: Not on file   Social Connections: Not on file   Intimate Partner Violence: Not on file   Housing Stability: Not on file     Past Medical History:   Diagnosis Date    Arthritis     Club foot     had surgical correction    GERD (gastroesophageal reflux disease)     \"mild\"    Hemihypertrophy     Hypertension     Pre-diabetes          Prior to Admission Medications   Prescriptions Last Dose Informant Patient Reported? Taking? TURMERIC PO 2/7/2023  Yes No   Sig: Take  by mouth.    azelastine (ASTELIN) 137 mcg (0.1 %) nasal spray Unknown  No No   [place] by Both Nostrils route two (2) times a day. Use in each nostril as directed   biotin 10,000 mcg cap 2023  Yes No   Sig: biotin 10,000 mcg capsule   Take 1 capsule every day by oral route. cetirizine (ZYRTEC) 10 mg tablet Unknown  No No   Sig: TAKE 1 TABLET BY MOUTH NIGHTLY   cholecalciferol (VITAMIN D3) 25 mcg (1,000 unit) cap 2023  Yes No   Sig: Vitamin D3 25 mcg (1,000 unit) capsule   Take 1 capsule every day by oral route. fluticasone propionate (FLONASE) 50 mcg/actuation nasal spray Unknown  No No   Sig: SPRAY 2 SPRAYS INTO EACH NOSTRIL EVERY DAY   lisinopriL (PRINIVIL, ZESTRIL) 10 mg tablet 2023  No Yes   Sig: Take 1 Tablet by mouth in the morning. Facility-Administered Medications: None       The patient was seen and examined in the endoscopy suite. The airway was assessed and documented. The problem list and medications were reviewed. Chief complaint, history of present illness, and review of systems and Past medical History are positive for: colon cancer screening    The heart, lungs and mental status were satisfactory for the administration of sedation and for the procedure. I discussed with the patient the objectives, risks, consequences and alternatives to the procedure. The patient was counseled at length about the risks of compa Covid-19 in the alessia-operative and post-operative states including the recovery window of their procedure. The patient was made aware that compa Covid-19 after a surgical procedure may worsen their prognosis for recovering from the virus and lend to a higher morbidity and or mortality risk. The patient was given the options of postponing their procedure. All of the risks, benefits, and alternatives were discussed. The patient does  wish to proceed with the procedure.     Plan: Endoscopic procedure with sedation     Jona Villanueva MD   2023  1:16 PM

## 2023-02-09 NOTE — PROCEDURES
[address] [facility]  [address]                 Colonoscopy Procedure Note    Indications:   See Preoperative Diagnosis above  Referring Physician: Daphney Alcaraz MD  Anesthesia/Sedation: MAC anesthesia Propofol  Endoscopist:  Dr. Arline Griffin  Assistant:  Endoscopy Technician-1: Cindy New  Endoscopy RN-1: Kassy Self RN  Preoperative diagnosis: SCREENING  Postoperative diagnosis: 1. -     Procedure in Detail:  Informed consent was obtained for the procedure, including sedation. Risks of perforation, hemorrhage, adverse drug reaction, and aspiration were discussed. The patient was placed in the left lateral decubitus position. Based on the pre-procedure assessment, including review of the patient's medical history, medications, allergies, and review of systems, she had been deemed to be an appropriate candidate for  sedation; she was therefore sedated with the medications listed above. The patient was monitored continuously with ECG tracing, pulse oximetry, blood pressure monitoring, and direct observations. A rectal examination was performed. The [id number] was inserted into the rectum and advanced under direct vision to the cecum, which was identified by the ileocecal valve. The quality of the colonic preparation was good. A careful inspection was made as the colonoscope was withdrawn, including a retroflexed view of the rectum; findings and interventions are described below. Appropriate photodocumentation was not obtained. Findings:  Rectum: normal  Sigmoid: normal  Descending Colon: normal  Transverse Colon: normal  Ascending Colon: normal  Cecum: normal    Specimens:    none    EBL: None    Complications: None; patient tolerated the procedure well. Recommendations:     - For colon cancer screening in this average-risk patient, colonoscopy may be repeated in 10 years.       Signed By: Arline Griffin MD February 9, 2023

## 2023-02-09 NOTE — ANESTHESIA PREPROCEDURE EVALUATION
Relevant Problems   CARDIOVASCULAR   (+) Hypertension      RENAL FAILURE   (+) CKD stage G3a/A2, GFR 45-59 and albumin creatinine ratio  mg/g (HCC)       Anesthetic History   No history of anesthetic complications            Review of Systems / Medical History  Patient summary reviewed, nursing notes reviewed and pertinent labs reviewed    Pulmonary  Within defined limits                 Neuro/Psych   Within defined limits           Cardiovascular    Hypertension              Exercise tolerance: >4 METS     GI/Hepatic/Renal     GERD: well controlled    Renal disease: CRI       Endo/Other        Arthritis     Other Findings              Physical Exam    Airway  Mallampati: II  TM Distance: 4 - 6 cm  Neck ROM: normal range of motion   Mouth opening: Normal     Cardiovascular  Regular rate and rhythm,  S1 and S2 normal,  no murmur, click, rub, or gallop             Dental  No notable dental hx       Pulmonary  Breath sounds clear to auscultation               Abdominal  GI exam deferred       Other Findings            Anesthetic Plan    ASA: 2  Anesthesia type: MAC          Induction: Intravenous  Anesthetic plan and risks discussed with: Patient

## 2023-02-09 NOTE — PROGRESS NOTES

## 2023-02-10 ENCOUNTER — PATIENT MESSAGE (OUTPATIENT)
Dept: INTERNAL MEDICINE CLINIC | Age: 48
End: 2023-02-10

## 2023-02-10 DIAGNOSIS — M25.579 ANKLE PAIN, UNSPECIFIED CHRONICITY, UNSPECIFIED LATERALITY: Primary | ICD-10-CM

## 2023-02-10 NOTE — TELEPHONE ENCOUNTER
From: Yajaira Eduardo  To: Paul Lucia MD  Sent: 2/10/2023 11:41 AM EST  Subject: Foot pain    Hello Dr Gurdeep Evans,     I hope you are well. I think the time has come. I need to address the foot pain I have been experiencing for a couple of months. I've put it on the back burner for awhile because lately everything has been about my knee and back. And all of my ortho issues can be consuming, so I can only deal with a two at a time. But my foot needs some attention now. It's move to everyday pain. So I want to know if I am able to see a orthopedic foot Dr? I know from experience that a podiatrist does not have much to offer especially with my history. I really wish I could see someone that is interest in the totality of my situation vs piece milling this whole thing, but I guess that is not an option for me. When one of (back/hip/knee/foot) is not doing to well, it effects all the others. Kinga Khan and honestly it's draining. Please let me know.     Best,   Nicolás Kebede

## 2023-03-07 ENCOUNTER — OFFICE VISIT (OUTPATIENT)
Dept: ORTHOPEDIC SURGERY | Age: 48
End: 2023-03-07

## 2023-03-07 VITALS — BODY MASS INDEX: 26.13 KG/M2 | HEIGHT: 62 IN | WEIGHT: 142 LBS

## 2023-03-07 DIAGNOSIS — M25.571 CHRONIC PAIN OF RIGHT ANKLE: ICD-10-CM

## 2023-03-07 DIAGNOSIS — Q74.2: Primary | ICD-10-CM

## 2023-03-07 DIAGNOSIS — G89.29 CHRONIC PAIN OF RIGHT ANKLE: ICD-10-CM

## 2023-03-07 DIAGNOSIS — Q66.10 CAVOVARUS FOOT, CONGENITAL: ICD-10-CM

## 2023-03-07 DIAGNOSIS — Q66.221: ICD-10-CM

## 2023-03-07 DIAGNOSIS — Z96.9 PRESENCE OF RETAINED HARDWARE: ICD-10-CM

## 2023-03-07 DIAGNOSIS — Z98.890 HISTORY OF FOOT SURGERY: ICD-10-CM

## 2023-03-07 DIAGNOSIS — M79.671 RIGHT FOOT PAIN: ICD-10-CM

## 2023-03-07 NOTE — LETTER
3/13/2023    Patient: Love Taylor   YOB: 1975   Date of Visit: 3/7/2023     Shayy Mitchell MD  [address]  [address]  [facility] [address]  [facility]    Dear Shayy Mitchell MD,      Thank you for referring Ms. Dat Giraldo to [facility] for evaluation. My notes for this consultation are attached. If you have questions, please do not hesitate to call me. I look forward to following your patient along with you.       Sincerely,    Shira Jennings MD

## 2023-03-07 NOTE — PROGRESS NOTES
Imani Wilder (: 1975) is a 56 y.o. female, patient,here for evaluation of the following   Chief Complaint   Patient presents with    Ankle Pain        ASSESSMENT/PLAN:  Below is the assessment and plan developed based on review of pertinent history, physical exam, labs, studies, and medications. 1. Congenital deformity of right lower extremity  2. Congenital metatarsus adductus of right foot  3. Cavovarus foot, congenital  4. Presence of retained hardware  5. Right foot pain  -     XR STANDING FOOT RT MIN 3 V; Future  6. Chronic pain of right ankle  -     XR STANDING ANKLE RT MIN 3 V; Future  -     CT LOW EXT RT WO CONT; Future  7. History of foot surgery    Patient verbalized understanding of exam findings and treatment plan. We engaged in the shared decision-making process and treatment options were discussed at length with the patient. Surgical and conservative management discussed today along with risk and benefits. Patient has pain over the dorsum of the foot into the ankle, I am suspecting the possibility that the 3 large screws are impinging on attempted dorsiflexion, she is got 3 large frag screws that are coming from dorsal to plantar for subtalar joint fusion, the screws are somewhat close to the ankle joint when the ankle is dorsiflexed, there is a residual cavovarus foot, history of congenital clubfoot status post multiple procedures at [address] [place] [address] based on the review of the notes that she brought from outside facility. She had metatarsus abductus and a calcaneus deformity with hypertrophy of her right lower extremity. She has an underdeveloped extremity. She has residual deformities of cavovarus foot position but her ankle is slightly in valgus. It appears at time of birth she also had a right hip subluxation which was treated with varus osteotomy.   Currently her pain is at the foot and I believe it could be the hardware but I would like to get a CT scan to further evaluate the position of the hardware and there is changes to the bone indicating impingement, she certainly has a cavovarus foot position which may contribute to some of the pain. Further discussion about treatment will be had once the CT scan is reviewed. I discussed management options with the patient. All questions were answered to the best of my ability and to the patient's satisfaction. I discussed their history, symptoms, physical exam findings, diagnostic testing results, diagnosis and treatment options. The patient verbalized understanding and elected to proceed with conservative treatment as above, pending CT scan results. Return for Review CT scan when completed, treatment as indicated. No Known Allergies    Current Outpatient Medications   Medication Sig    cholecalciferol (VITAMIN D3) 25 mcg (1,000 unit) cap Vitamin D3 25 mcg (1,000 unit) capsule   Take 1 capsule every day by oral route. biotin 10,000 mcg cap biotin 10,000 mcg capsule   Take 1 capsule every day by oral route. TURMERIC PO Take  by mouth.    lisinopriL (PRINIVIL, ZESTRIL) 10 mg tablet Take 1 Tablet by mouth in the morning. cetirizine (ZYRTEC) 10 mg tablet TAKE 1 TABLET BY MOUTH NIGHTLY    fluticasone propionate (FLONASE) 50 mcg/actuation nasal spray SPRAY 2 SPRAYS INTO EACH NOSTRIL EVERY DAY    azelastine (ASTELIN) 137 mcg (0.1 %) nasal spray 1 [place] by Both Nostrils route two (2) times a day. Use in each nostril as directed     No current facility-administered medications for this visit.        Past Medical History:   Diagnosis Date    Arthritis     Club foot     had surgical correction    GERD (gastroesophageal reflux disease)     \"mild\"    Hemihypertrophy     Hypertension     Pre-diabetes        Past Surgical History:   Procedure Laterality Date    COLONOSCOPY N/A 2/9/2023    COLONOSCOPY performed by Alfredo Navarrete MD at [facility] ENDOSCOPY    HX BREAST REDUCTION  2017    HX DILATION AND CURETTAGE 10/2020    HX ORTHOPAEDIC      multiple surgeries to Rt side of her body    HX OTHER SURGICAL  1980s    right club foot surgery, right hip surgery, right knee surgery    HX SPLENECTOMY  1994    spleen laceration  leading to removal of spleen       Family History   Problem Relation Age of Onset    Diabetes Mother     Hypertension Mother     Other Mother         Hyperparathyroidism and Sarcoidosis    Kidney Disease Mother     Coronary Art Dis Father     Cancer Father         liver & prostate    Heart Disease Father     No Known Problems Sister     Hypertension Maternal Grandmother     Heart Failure Maternal Grandmother     Heart Attack Maternal Grandfather     Diabetes Paternal Grandmother         insulin    No Known Problems Paternal Grandfather        Social History     Socioeconomic History    Marital status: SINGLE     Spouse name: Not on file    Number of children: Not on file    Years of education: Not on file    Highest education level: Not on file   Occupational History    Not on file   Tobacco Use    Smoking status: Light Smoker     Types: Cigars    Smokeless tobacco: Never    Tobacco comments:     ! cigar two times per year   Vaping Use    Vaping Use: Never used   Substance and Sexual Activity    Alcohol use:  Yes     Alcohol/week: 3.0 - 4.0 standard drinks     Types: 3 - 4 Glasses of wine per week    Drug use: Yes     Types: Marijuana     Comment: occasionally    Sexual activity: Not on file   Other Topics Concern    Not on file   Social History Narrative    Not on file     Social Determinants of Health     Financial Resource Strain: Not on file   Food Insecurity: Not on file   Transportation Needs: Not on file   Physical Activity: Not on file   Stress: Not on file   Social Connections: Not on file   Intimate Partner Violence: Not on file   Housing Stability: Not on file           Vitals:  Ht 5' 2\" (1.575 m)   Wt 142 lb (64.4 kg)   BMI 25.97 kg/m²    Body mass index is 25.97 kg/m².        SUBJECTIVE:  Tammy Lissette (: 1975)   New patient presents today with complaint of right ankle and foot pain, states she was born with \"clubfeet\" and is having some problems with it. She had surgical treatment in the past, last procedure was about 20 years ago. The pain she is currently experiencing started about 1 year ago, over the past several months has been more painful at the right foot, chronic pain that is severe sharp stabbing and constant comes and goes as well. There is weakness associated, stairs/steps and walking make it worse. Getting worse despite rest, patient had surgery by a Dr. Romero Speaker numerous procedures since birth. She is currently not diabetic, she smokes 2 cigars 2 times a year, states light smoker. OBJECTIVE EXAM:     Visit Vitals  Ht 5' 2\" (1.575 m)   Wt 142 lb (64.4 kg)   BMI 25.97 kg/m²       Appearance: Alert, well appearing and pleasant patient who is in no distress, oriented to person, place/time, and who follows commands. This patient is accompanied in the       office by her  self. Psychiatric: Affect and mood are appropriate. No dementia noted on examination  Musculoskeletal:  LOCATION: There is diffuse tenderness over the dorsum of hindfoot towards the ankle joint, minimal swelling ankle - right and foot - right      Integumentary: No rashes, skin patches, wounds, or abrasions to the right or left legs       Warm and normal color. No regions of expressible drainage.       Gait: Normal      Tenderness: No tenderness        Motor/Strength/Tone Exam: Normal       Sensory Exam:   Intact Normal Sensation to ankle/foot      Stability Testing: No anterolateral or varus instability of the Ankle or Subtalar Joints               No peroneal tendon instability noted      ROM: Normal ROM noted to ankle/foot      Contractures: No Achilles or Gastrocnemius Contractures      Calf tenderness: Absent for calf or gastrocnemius muscle regions       Soft, supple, non tender, non taut lower extremity compartment  Alignment:      NEUTRAL Hindfoot,         none Metatarsus Adductus Metatarsus   Wounds/Abrasions:    None present  Extremities:   No embolic phenomena to the toes          No significant edema to the foot and or toes. Lower extremities are warm and appear well perfused    DVT: No evidence of DVT seen on examination at this time     No calf swelling, no tenderness to calf muscles  Lymphatic:  No Evidence of Lymphedema  Vascular: Medial Border of Tibia Region: Edema is not present         Pulses: Dorsalis Pedis &  Posterior Tibial Pulses : Palpable yes        Varicosities Lower Limbs :  None  noted  Neuro: Negative bilateral Straight leg raise (seated position)    See Musculoskeletal section for pertinent individual extremity examination    No abnormal hand/wrist, foot/ankle, or facial/neck tremors. Lower Extremity/Ankle/Foot:  Antalgic gait, satisfactory with right limb slightly shorter than left during weightbearing stance. Right lower extremity/ankle: There is a shorter and thinner lower extremity, congenital deformity, tibia and fibula nontender from the midshaft down to the ankle, tender towards the dorsal hindfoot, pain with increased attempted dorsiflexion, previous surgical incisions well-healed. Right foot: There is a cavovarus foot position, subtalar joint motion is limited due to effusion, tenderness over the dorsal hindfoot, screws are slightly palpable under the skin at the area of pain, forefoot supination, mostly rigid deformities, skin intact without open wounds or lesions, capillary refill present. Contralateral lower extremity: Skin intact without erythema or wounds. 2+ dorsalis pedis pulse. Toes are warm, and well-perfused. Sensation is intact to light touch in the DP, SP, sural, saphenous, and tibial nerve distributions. 5/5 strength in ankle dorsiflexion, plantarflexion, inversion, and eversion.   Ankle range of motion is 10 degrees of dorsiflexion to 40 degrees of plantarflexion. Smooth and painless hindfoot and midfoot range of motion. No severe hallux, lesser toe malalignment or deformities, no pain with passive motion of lesser MTP joints, hallux MTP joint, no first TMT instability. Neurovascular exam grossly intact light touch sensation all nerve distributions, strength for ankle dorsiflexion and plantarflexion is near 5/5, EHL/FHL 5/5, dorsalis pedis pulse not easily palpable. Foot is well perfused and foot is warm. Imaging:    XR Results (maximum last 2): Results from Appointment encounter on 03/07/23    XR STANDING ANKLE RT MIN 3 V    Narrative  Right ankle standing AP, lateral and oblique x-rays show slightly shorter right lower extremity compared to contralateral on AP view, satisfactory ankle joint alignment, slight valgus positioning to the distal tibia but the joint is not severely deformed, there are multiple retained large frag screws. The screws appear to be for a subtalar joint arthrodesis. Ankle joint is free of implants. XR STANDING FOOT RT MIN 3 V    Narrative  Right foot AP, lateral and oblique standing x-rays show supinated foot, degenerative changes hindfoot midfoot joints, there is retained hardware of which are large frag screws and some of these are prominent dorsal and 1 protrudes laterally. Postsurgical changes as well as congenital anomalies to the foot. Forefoot hallux valgus with and hammertoe deformities. An electronic signature was used to authenticate this note.   -- Cherie Pineda MD

## 2023-03-21 ENCOUNTER — DOCUMENTATION ONLY (OUTPATIENT)
Dept: ORTHOPEDIC SURGERY | Age: 48
End: 2023-03-21

## 2023-03-21 DIAGNOSIS — M79.671 CHRONIC PAIN IN RIGHT FOOT: ICD-10-CM

## 2023-03-21 DIAGNOSIS — Z98.890 HISTORY OF FOOT SURGERY: ICD-10-CM

## 2023-03-21 DIAGNOSIS — G89.29 CHRONIC PAIN OF RIGHT ANKLE: Primary | ICD-10-CM

## 2023-03-21 DIAGNOSIS — G89.29 CHRONIC PAIN IN RIGHT FOOT: ICD-10-CM

## 2023-03-21 DIAGNOSIS — M25.571 CHRONIC PAIN OF RIGHT ANKLE: Primary | ICD-10-CM

## 2023-03-21 NOTE — PROGRESS NOTES
A CT scan was ordered to determine whether the screws in the dorsum of foot are impinging on her ankle during gait and dorsiflexion of ankle. Physical therapy is not going to help that problem, more likely than not, physical therapy will instead cause more pain. For that reason I am going to be resubmitting the CT scan order again. CT scan will help determine whether patient needs surgical treatment to remove the screws that appear to be causing her pain at the ankle which was notable on exam.  The screws have been present for many years, they are prominent over the dorsum of the foot where she is experiencing severe pain. Patient will return after CT scan completed, if it is denied again, will move forward with peer to peer.

## 2023-04-07 ENCOUNTER — HOSPITAL ENCOUNTER (OUTPATIENT)
Dept: CT IMAGING | Age: 48
End: 2023-04-07
Attending: ORTHOPAEDIC SURGERY
Payer: COMMERCIAL

## 2023-04-11 ENCOUNTER — OFFICE VISIT (OUTPATIENT)
Dept: ORTHOPEDIC SURGERY | Age: 48
End: 2023-04-11
Payer: COMMERCIAL

## 2023-04-11 VITALS — BODY MASS INDEX: 26.13 KG/M2 | WEIGHT: 142 LBS | HEIGHT: 62 IN

## 2023-04-11 DIAGNOSIS — M84.361A STRESS FRACTURE OF RIGHT TIBIA, INITIAL ENCOUNTER: Primary | ICD-10-CM

## 2023-04-11 DIAGNOSIS — Z96.9 PRESENCE OF RETAINED HARDWARE: ICD-10-CM

## 2023-04-11 DIAGNOSIS — Q74.2: ICD-10-CM

## 2023-04-11 DIAGNOSIS — Z98.890 HISTORY OF FOOT SURGERY: ICD-10-CM

## 2023-04-11 PROCEDURE — 99212 OFFICE O/P EST SF 10 MIN: CPT | Performed by: ORTHOPAEDIC SURGERY

## 2023-04-20 ENCOUNTER — TELEPHONE (OUTPATIENT)
Dept: INTERNAL MEDICINE CLINIC | Age: 48
End: 2023-04-20

## 2023-04-20 NOTE — TELEPHONE ENCOUNTER
During patient's reminder call, patient requested to cancel her appointment with Dr. Cally Jacob on 4/27/23 due to having another appointment scheduled on the same day and time. Patient declined to reschedule. Patient stated that her appointment was to discuss her recent referral to ortho. Patient stated that Dr. Cally Jacob had referred her to Dr. Damon Alicia, who then referred her to [facility]. She has an appointment with their office in two weeks and wants to know if there's anything she needs from Dr. Cally Jacob, such as another referral. Advised patient that I would send a message to the nurse to confirm if an appointment or a referral is needed.

## 2023-04-22 DIAGNOSIS — E04.2 MULTINODULAR GOITER (NONTOXIC): Primary | ICD-10-CM

## 2023-04-23 DIAGNOSIS — E04.2 MULTINODULAR GOITER (NONTOXIC): Primary | ICD-10-CM

## 2023-04-23 DIAGNOSIS — E55.9 HYPOVITAMINOSIS D: Primary | ICD-10-CM

## 2023-05-03 ENCOUNTER — OFFICE VISIT (OUTPATIENT)
Dept: INTERNAL MEDICINE CLINIC | Age: 48
End: 2023-05-03
Payer: COMMERCIAL

## 2023-05-03 VITALS
HEART RATE: 71 BPM | BODY MASS INDEX: 25.54 KG/M2 | HEIGHT: 62 IN | OXYGEN SATURATION: 99 % | DIASTOLIC BLOOD PRESSURE: 80 MMHG | RESPIRATION RATE: 18 BRPM | WEIGHT: 138.8 LBS | TEMPERATURE: 97.8 F | SYSTOLIC BLOOD PRESSURE: 123 MMHG

## 2023-05-03 DIAGNOSIS — Q66.41 CONGENITAL TALIPES CALCANEOVALGUS OF RIGHT FOOT: Primary | ICD-10-CM

## 2023-05-03 DIAGNOSIS — I10 ESSENTIAL HYPERTENSION: ICD-10-CM

## 2023-05-03 PROBLEM — M21.70 LOWER LIMB LENGTH DIFFERENCE: Status: ACTIVE | Noted: 2019-04-25

## 2023-05-03 PROBLEM — M79.671 RIGHT FOOT PAIN: Status: ACTIVE | Noted: 2023-04-28

## 2023-05-03 PROBLEM — Q89.8 HEMIHYPERTROPHY: Status: ACTIVE | Noted: 2019-04-25

## 2023-05-03 PROBLEM — Q66.01 CONGENITAL TALIPES EQUINOVARUS DEFORMITY OF RIGHT FOOT: Status: ACTIVE | Noted: 2019-04-25

## 2023-05-03 PROCEDURE — 99214 OFFICE O/P EST MOD 30 MIN: CPT | Performed by: INTERNAL MEDICINE

## 2023-05-03 RX ORDER — LISINOPRIL 10 MG/1
10 TABLET ORAL DAILY
Qty: 90 TABLET | Refills: 2 | Status: SHIPPED | OUTPATIENT
Start: 2023-05-03

## 2023-05-10 ENCOUNTER — TELEPHONE (OUTPATIENT)
Age: 48
End: 2023-05-10

## 2023-05-10 DIAGNOSIS — Q66.01 CONGENITAL TALIPES EQUINOVARUS DEFORMITY OF RIGHT FOOT: Primary | ICD-10-CM

## 2023-05-10 NOTE — TELEPHONE ENCOUNTER
----- Message from Greg Cobian sent at 5/10/2023  9:23 AM EDT -----  Regarding: Dr Merrill Brewer was a no go  Contact: [phone]  Morning Dr Zeus Joe    So Dr Manjit Verdin office stated that he does not perform the specific injection explained on the referral.  But Dr Lashanda Gutierrez told me that I would probably have success with Dr Macho Herrera:    Jackelyn Wasserman  Monday May 15, 2023  Arrive by 10:10 AM EDT  Starts at 10:30 AM EDT  Add to calendar  OV Enmanuel  Across from [facility] [place] [address] [address] [phone]    Could we please switch the referral to Dr Saran Silverio office. I am crossing my finger AND toes this time!     Edinson Martínez

## 2023-05-24 ENCOUNTER — PATIENT MESSAGE (OUTPATIENT)
Age: 48
End: 2023-05-24

## 2023-05-24 DIAGNOSIS — G89.4 CHRONIC PAIN SYNDROME: ICD-10-CM

## 2023-05-24 DIAGNOSIS — Q66.41: Primary | ICD-10-CM

## 2023-05-30 NOTE — TELEPHONE ENCOUNTER
Crystal Alonzo 5/30/2023 8:11 AM EDT      ----- Message -----  From: Albino Kline  Sent: 5/29/2023 12:00 PM EDT  To: , *  Subject: Follow up from Dr Charmayne Newcomer Issue     I am willing to try that. I'm getting use to the fact that between my back, foot, hip and knee issues is that pain management is my best route at this moment in time. I guess the foot got jealous . .. LOL!     Mahogany Canseco

## 2023-06-19 SDOH — ECONOMIC STABILITY: FOOD INSECURITY: WITHIN THE PAST 12 MONTHS, YOU WORRIED THAT YOUR FOOD WOULD RUN OUT BEFORE YOU GOT MONEY TO BUY MORE.: NEVER TRUE

## 2023-06-19 SDOH — ECONOMIC STABILITY: INCOME INSECURITY: HOW HARD IS IT FOR YOU TO PAY FOR THE VERY BASICS LIKE FOOD, HOUSING, MEDICAL CARE, AND HEATING?: NOT VERY HARD

## 2023-06-19 SDOH — ECONOMIC STABILITY: TRANSPORTATION INSECURITY
IN THE PAST 12 MONTHS, HAS LACK OF TRANSPORTATION KEPT YOU FROM MEETINGS, WORK, OR FROM GETTING THINGS NEEDED FOR DAILY LIVING?: NO

## 2023-06-19 SDOH — ECONOMIC STABILITY: FOOD INSECURITY: WITHIN THE PAST 12 MONTHS, THE FOOD YOU BOUGHT JUST DIDN'T LAST AND YOU DIDN'T HAVE MONEY TO GET MORE.: NEVER TRUE

## 2023-06-19 SDOH — ECONOMIC STABILITY: HOUSING INSECURITY
IN THE LAST 12 MONTHS, WAS THERE A TIME WHEN YOU DID NOT HAVE A STEADY PLACE TO SLEEP OR SLEPT IN A SHELTER (INCLUDING NOW)?: NO

## 2023-06-22 ENCOUNTER — OFFICE VISIT (OUTPATIENT)
Age: 48
End: 2023-06-22
Payer: COMMERCIAL

## 2023-06-22 VITALS
HEART RATE: 65 BPM | RESPIRATION RATE: 18 BRPM | WEIGHT: 138.8 LBS | TEMPERATURE: 97.5 F | SYSTOLIC BLOOD PRESSURE: 113 MMHG | BODY MASS INDEX: 25.54 KG/M2 | OXYGEN SATURATION: 99 % | HEIGHT: 62 IN | DIASTOLIC BLOOD PRESSURE: 74 MMHG

## 2023-06-22 DIAGNOSIS — R73.03 PRE-DIABETES: ICD-10-CM

## 2023-06-22 DIAGNOSIS — I10 ESSENTIAL (PRIMARY) HYPERTENSION: ICD-10-CM

## 2023-06-22 DIAGNOSIS — Q66.41: Primary | ICD-10-CM

## 2023-06-22 PROCEDURE — 3074F SYST BP LT 130 MM HG: CPT | Performed by: INTERNAL MEDICINE

## 2023-06-22 PROCEDURE — 99214 OFFICE O/P EST MOD 30 MIN: CPT | Performed by: INTERNAL MEDICINE

## 2023-06-22 PROCEDURE — 3078F DIAST BP <80 MM HG: CPT | Performed by: INTERNAL MEDICINE

## 2023-06-22 RX ORDER — LIDOCAINE 50 MG/G
1 PATCH TOPICAL DAILY
Qty: 10 PATCH | Refills: 0 | Status: SHIPPED | OUTPATIENT
Start: 2023-06-22 | End: 2023-07-02

## 2023-06-22 SDOH — ECONOMIC STABILITY: FOOD INSECURITY: WITHIN THE PAST 12 MONTHS, YOU WORRIED THAT YOUR FOOD WOULD RUN OUT BEFORE YOU GOT MONEY TO BUY MORE.: NEVER TRUE

## 2023-06-22 SDOH — ECONOMIC STABILITY: FOOD INSECURITY: WITHIN THE PAST 12 MONTHS, THE FOOD YOU BOUGHT JUST DIDN'T LAST AND YOU DIDN'T HAVE MONEY TO GET MORE.: NEVER TRUE

## 2023-06-22 ASSESSMENT — PATIENT HEALTH QUESTIONNAIRE - PHQ9
1. LITTLE INTEREST OR PLEASURE IN DOING THINGS: 0
2. FEELING DOWN, DEPRESSED OR HOPELESS: 0
SUM OF ALL RESPONSES TO PHQ QUESTIONS 1-9: 0
SUM OF ALL RESPONSES TO PHQ9 QUESTIONS 1 & 2: 0
SUM OF ALL RESPONSES TO PHQ QUESTIONS 1-9: 0

## 2023-06-22 ASSESSMENT — SOCIAL DETERMINANTS OF HEALTH (SDOH): HOW HARD IS IT FOR YOU TO PAY FOR THE VERY BASICS LIKE FOOD, HOUSING, MEDICAL CARE, AND HEATING?: NOT HARD AT ALL

## 2023-06-22 NOTE — PROGRESS NOTES
1. \"Have you been to the ER, urgent care clinic since your last visit? Hospitalized since your last visit? \" No    2. \"Have you seen or consulted any other health care providers outside of the [address] [place] [address] since your last visit? \" No     3. For patients aged 39-70: Has the patient had a colonoscopy / FIT/ Cologuard? Yes - no Care Gap present      If the patient is female:    4. For patients aged 41-77: Has the patient had a mammogram within the past 2 years? Yes - no Care Gap present      5. For patients aged 21-65: Has the patient had a pap smear?  Yes - no Care Gap present

## 2023-06-22 NOTE — PROGRESS NOTES
Progress Notes by Deborah Stringer MD at 05/03/23 1100                    Author: Deborah Stringer MD  Service: --  Author Type: Physician       Filed: 05/03/23 1323  Encounter Date: 5/3/2023  Status: Signed                       Ms. Maria Teresa Akers  is presenting to follow up       CC:   Foot Pain          HPI:         57 yo woman with a hx of HTN and CKD stage 3 presenting to discuss R foot and ankle pain          Patient has congenital talipes equinovarus deformity of right foot   Had multiple surgeries in foot ankle as a child   Has had increased pain in right foot and ankle    She saw Dr Isidro Love and had a CT scan of ankle  April       CT lower extremity       1. Solid osseous union of subtalar, talonavicular and calcaneocuboid joints. 2. 3 anteroposterior X screws with positioning as above. No evidence for screw   loosening. 3. At least mild osteoarthrosis of ankle joint. Dr Isidro Love recommended [facility] saw Dr Zuri Monzon at [facility]      Non-surgical options discussed:  Discussed option of a corticosteroid injection to the right ankle joint. Order placed for FL guided steroid injection to the right  ankle joint. Call [phone] to schedule at [facility]. Paper order given to be done elsewhere. Comfortable/supportive shoes   Activity as tolerated   Surgical option discussed: removal of hardware right foot, right tendo-achilles lengthening. Would be non- weightbearing for 2 weeks post op in a splint, then in a walking cast for 3 weeks, then in a boot.        She would like to schedule the steroid injection with a local physician but when [facility] told \"  Did not do that\"          Review of systems:   Constitutional: negative for fever, chills, weight loss, night sweats    10 systems reviewed and negative other then HPI              Past Medical History:       Diagnosis                                                                      Past Surgical History:        Procedure

## 2023-06-22 NOTE — PROGRESS NOTES
Ms. Eve Morin is presenting to follow up     CC:  Hypertension       HPI:       CC:   Foot Pain          HPI:         55 yo woman with a hx of HTN and CKD stage 3 presenting to discuss R foot and ankle pain          Patient has congenital talipes equinovarus deformity of right foot   Had multiple surgeries in foot ankle as a child   Has had increased pain in right foot and ankle    She saw Dr Lisa Lima and had a CT scan of ankle  April       CT lower extremity       1. Solid osseous union of subtalar, talonavicular and calcaneocuboid joints. 2. 3 anteroposterior X screws with positioning as above. No evidence for screw   loosening. 3. At least mild osteoarthrosis of ankle joint. Dr Lisa Lima recommended [facility] saw Dr Maximiliano Murrieta at [facility]      Non-surgical options discussed:  Discussed option of a corticosteroid injection to the right ankle joint. Order placed for FL guided steroid injection to the right  ankle joint. Call [phone] to schedule at [facility]. Paper order given to be done elsewhere. Comfortable/supportive shoes   Activity as tolerated   Surgical option discussed: removal of hardware right foot, right tendo-achilles lengthening. Would be non- weightbearing for 2 weeks post op in a splint, then in a walking cast for 3 weeks, then in a boot. She would like to schedule the steroid injection with a local physician but has not had success.  She is frustrated       HTN: she is on lisinopril 10mg daily   Blood pressure is controlled   Review of systems:      10 systems reviewed and negative other then HPI     Past Medical History:   Diagnosis Date    Arthritis     Club foot     had surgical correction    GERD (gastroesophageal reflux disease)     \"mild\"    Hemihypertrophy     Hypertension     Pre-diabetes         Past Surgical History:   Procedure Laterality Date    BREAST REDUCTION SURGERY  2017    COLONOSCOPY N/A 2/9/2023    COLONOSCOPY performed by Meena Sims MD at [facility] [place]

## 2023-06-23 LAB
ANION GAP SERPL CALC-SCNC: 3 MMOL/L (ref 5–15)
BUN SERPL-MCNC: 25 MG/DL (ref 6–20)
BUN/CREAT SERPL: 23 (ref 12–20)
CALCIUM SERPL-MCNC: 11.1 MG/DL (ref 8.5–10.1)
CHLORIDE SERPL-SCNC: 108 MMOL/L (ref 97–108)
CO2 SERPL-SCNC: 28 MMOL/L (ref 21–32)
CREAT SERPL-MCNC: 1.09 MG/DL (ref 0.55–1.02)
EST. AVERAGE GLUCOSE BLD GHB EST-MCNC: 117 MG/DL
GLUCOSE SERPL-MCNC: 103 MG/DL (ref 65–100)
HBA1C MFR BLD: 5.7 % (ref 4–5.6)
POTASSIUM SERPL-SCNC: 5 MMOL/L (ref 3.5–5.1)
SODIUM SERPL-SCNC: 139 MMOL/L (ref 136–145)

## 2023-06-30 ENCOUNTER — NURSE ONLY (OUTPATIENT)
Age: 48
End: 2023-06-30

## 2023-06-30 DIAGNOSIS — E83.52 SERUM CALCIUM ELEVATED: ICD-10-CM

## 2023-07-01 ENCOUNTER — HOSPITAL ENCOUNTER (OUTPATIENT)
Facility: HOSPITAL | Age: 48
End: 2023-07-01
Payer: COMMERCIAL

## 2023-07-01 DIAGNOSIS — E04.2 MULTINODULAR GOITER (NONTOXIC): ICD-10-CM

## 2023-07-01 DIAGNOSIS — E21.3 HYPERPARATHYROIDISM (HCC): ICD-10-CM

## 2023-07-01 DIAGNOSIS — E55.9 HYPOVITAMINOSIS D: ICD-10-CM

## 2023-07-01 LAB
CALCIUM SERPL-MCNC: 10.7 MG/DL (ref 8.5–10.1)
PHOSPHATE SERPL-MCNC: 5 MG/DL (ref 2.6–4.7)
PTH-INTACT SERPL-MCNC: 118.3 PG/ML (ref 18.4–88)

## 2023-07-01 PROCEDURE — 76536 US EXAM OF HEAD AND NECK: CPT

## 2023-07-04 LAB — 1,25(OH)2D3 SERPL-MCNC: 61.9 PG/ML (ref 24.8–81.5)

## 2023-07-08 LAB — PTH RELATED PROT SERPL-SCNC: <2 PMOL/L

## 2023-07-15 LAB
25(OH)D3+25(OH)D2 SERPL-MCNC: 36.6 NG/ML (ref 30–100)
ALBUMIN SERPL-MCNC: 4.6 G/DL (ref 3.9–4.9)
BUN SERPL-MCNC: 24 MG/DL (ref 6–24)
BUN/CREAT SERPL: 21 (ref 9–23)
CALCIUM SERPL-MCNC: 11 MG/DL (ref 8.7–10.2)
CHLORIDE SERPL-SCNC: 100 MMOL/L (ref 96–106)
CO2 SERPL-SCNC: 23 MMOL/L (ref 20–29)
CREAT SERPL-MCNC: 1.12 MG/DL (ref 0.57–1)
EGFRCR SERPLBLD CKD-EPI 2021: 61 ML/MIN/1.73
GLUCOSE SERPL-MCNC: 95 MG/DL (ref 70–99)
PHOSPHATE SERPL-MCNC: 3.5 MG/DL (ref 3–4.3)
POTASSIUM SERPL-SCNC: 4.4 MMOL/L (ref 3.5–5.2)
PTH-INTACT SERPL-MCNC: 54 PG/ML (ref 15–65)
SODIUM SERPL-SCNC: 140 MMOL/L (ref 134–144)
TSH SERPL DL<=0.005 MIU/L-ACNC: 0.73 UIU/ML (ref 0.45–4.5)

## 2023-07-20 ENCOUNTER — OFFICE VISIT (OUTPATIENT)
Age: 48
End: 2023-07-20
Payer: COMMERCIAL

## 2023-07-20 VITALS
SYSTOLIC BLOOD PRESSURE: 133 MMHG | HEART RATE: 63 BPM | WEIGHT: 135.6 LBS | HEIGHT: 62 IN | BODY MASS INDEX: 24.95 KG/M2 | DIASTOLIC BLOOD PRESSURE: 89 MMHG

## 2023-07-20 DIAGNOSIS — E21.3 HYPERPARATHYROIDISM, UNSPECIFIED (HCC): ICD-10-CM

## 2023-07-20 DIAGNOSIS — E55.9 VITAMIN D DEFICIENCY, UNSPECIFIED: ICD-10-CM

## 2023-07-20 DIAGNOSIS — E04.2 NONTOXIC MULTINODULAR GOITER: Primary | ICD-10-CM

## 2023-07-20 PROCEDURE — 3075F SYST BP GE 130 - 139MM HG: CPT | Performed by: INTERNAL MEDICINE

## 2023-07-20 PROCEDURE — 3079F DIAST BP 80-89 MM HG: CPT | Performed by: INTERNAL MEDICINE

## 2023-07-20 PROCEDURE — 99214 OFFICE O/P EST MOD 30 MIN: CPT | Performed by: INTERNAL MEDICINE

## 2023-07-20 NOTE — PROGRESS NOTES
Chief Complaint   Patient presents with    Other     Elevated calcium level. History of Present Illness: Inocente Spatz is a 56 y.o. female here for follow up of multinodular goiter, hypercalcemia and hyperparathyroidism. In 2019 pt moved from [facility] to [facility] and went to Dr. Pat Mao to establish care. She had an elevated calcium at 10.5 and as part of the work-up she was sent for an [address], which did not show any apparent parathyroid nodules, it did show multiple thyroid nodules. Since that time pt has had multiple Ca levels drawn and they have mostly run in the 9.5-10.7 range. In September 2021 her Ca did spike to 11.4. Her PTH has been in the 67-85 range. Her PTHrP was negative, her TSH has been normal and she has had Vitamin D in the 24-30s range. She had repeat thyroid US in October 2021 as well as a sestimibi scan. The Thyroid US did not show any changes in thyroid nodules, but the sestimibi scan did show evidence of increased activity in the lower right thyroid pole consistent with parathyroid activity. She saw Dr. Caro Proctor in December 2021 and they discussed surgery and pt stated that she wanted to wait and follow up. Pt notes that she sees Dr. Mcdaniels Heading of nephrology. 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 He is concerned that my renal function is declining and that my calcium was high. At our initial visit in February 2022 her Ca was 10.3 with PTH of 83, her Vitamin D was 30.5, her 1,25-OH Vitamin D was 65.4. Her 24 hour urine calcium was 20. Her DXA scan showed osteopenia, but not Osteoporosis. Her eGFR was 73. \"I have been having issues of foot pain and I am getting cortisone injection in my right foot. Pt denies any recent illnesses, injuries or hospitalizations. Last week her PTH was 54, her Ca was 11.0 and Vitamin D was 36.6. Her TSH was 0.733. Her Thyroid US was unchanged from July 2022.     Pt does note that she

## 2023-07-27 ENCOUNTER — HOSPITAL ENCOUNTER (OUTPATIENT)
Facility: HOSPITAL | Age: 48
Discharge: HOME | End: 2023-07-27
Payer: COMMERCIAL

## 2023-07-27 DIAGNOSIS — M54.50 CHRONIC RIGHT-SIDED LOW BACK PAIN WITHOUT SCIATICA: ICD-10-CM

## 2023-07-27 DIAGNOSIS — G89.29 CHRONIC RIGHT-SIDED LOW BACK PAIN WITHOUT SCIATICA: ICD-10-CM

## 2023-07-27 DIAGNOSIS — M51.36 DDD (DEGENERATIVE DISC DISEASE), LUMBAR: ICD-10-CM

## 2023-07-27 DIAGNOSIS — M41.50 DEGENERATIVE SCOLIOSIS IN ADULT PATIENT: ICD-10-CM

## 2023-07-27 DIAGNOSIS — M47.817 LUMBOSACRAL SPONDYLOSIS WITHOUT MYELOPATHY: ICD-10-CM

## 2023-07-27 PROCEDURE — 72148 MRI LUMBAR SPINE W/O DYE: CPT

## 2023-09-08 RX ORDER — IBUPROFEN 600 MG/1
600 TABLET ORAL 3 TIMES DAILY PRN
Qty: 30 TABLET | Refills: 0 | Status: SHIPPED | OUTPATIENT
Start: 2023-09-08

## 2023-09-08 NOTE — TELEPHONE ENCOUNTER
----- Message from Amanda Allen sent at 9/8/2023  7:44 AM EDT -----  Regarding: Ibuprofen 600MG refill  Contact: [phone]  Song Magallon,     I wanted to ask if I could get a refill on my Ibuprofen 600mg. These were prescribed in 2022 by a Dr Royer Bolden, which I think was a emergency room visit. But I like to have this on hand for my very rough back/knee/foot days and I only have 2 left. Nothing I take regularly by any stretch, roughly about 1-2 a month as needed. Thank you in advance and have a good weekend!     Best,   [employer]

## 2024-01-01 DIAGNOSIS — E21.3 HYPERPARATHYROIDISM, UNSPECIFIED (HCC): ICD-10-CM

## 2024-01-01 DIAGNOSIS — E55.9 VITAMIN D DEFICIENCY, UNSPECIFIED: ICD-10-CM

## 2024-01-12 LAB
1,25(OH)2D SERPL-MCNC: 41.2 PG/ML (ref 24.8–81.5)
25(OH)D3+25(OH)D2 SERPL-MCNC: 36.6 NG/ML (ref 30–100)
ALBUMIN SERPL-MCNC: 4.4 G/DL (ref 3.9–4.9)
BUN SERPL-MCNC: 20 MG/DL (ref 6–24)
BUN/CREAT SERPL: 18 (ref 9–23)
CALCIUM SERPL-MCNC: 10.5 MG/DL (ref 8.7–10.2)
CHLORIDE SERPL-SCNC: 105 MMOL/L (ref 96–106)
CO2 SERPL-SCNC: 23 MMOL/L (ref 20–29)
CREAT SERPL-MCNC: 1.11 MG/DL (ref 0.57–1)
EGFRCR SERPLBLD CKD-EPI 2021: 62 ML/MIN/1.73
GLUCOSE SERPL-MCNC: 109 MG/DL (ref 70–99)
PHOSPHATE SERPL-MCNC: 3.5 MG/DL (ref 3–4.3)
POTASSIUM SERPL-SCNC: 4.8 MMOL/L (ref 3.5–5.2)
PTH-INTACT SERPL-MCNC: 53 PG/ML (ref 15–65)
SODIUM SERPL-SCNC: 142 MMOL/L (ref 134–144)

## 2024-01-18 ENCOUNTER — OFFICE VISIT (OUTPATIENT)
Age: 49
End: 2024-01-18
Payer: COMMERCIAL

## 2024-01-18 VITALS
BODY MASS INDEX: 25.8 KG/M2 | WEIGHT: 140.2 LBS | HEART RATE: 63 BPM | HEIGHT: 62 IN | SYSTOLIC BLOOD PRESSURE: 133 MMHG | DIASTOLIC BLOOD PRESSURE: 89 MMHG

## 2024-01-18 DIAGNOSIS — E04.2 NONTOXIC MULTINODULAR GOITER: Primary | ICD-10-CM

## 2024-01-18 DIAGNOSIS — E21.3 HYPERPARATHYROIDISM, UNSPECIFIED (HCC): ICD-10-CM

## 2024-01-18 DIAGNOSIS — E55.9 VITAMIN D DEFICIENCY, UNSPECIFIED: ICD-10-CM

## 2024-01-18 PROCEDURE — 99214 OFFICE O/P EST MOD 30 MIN: CPT | Performed by: INTERNAL MEDICINE

## 2024-01-18 PROCEDURE — 3075F SYST BP GE 130 - 139MM HG: CPT | Performed by: INTERNAL MEDICINE

## 2024-01-18 PROCEDURE — 3079F DIAST BP 80-89 MM HG: CPT | Performed by: INTERNAL MEDICINE

## 2024-01-18 NOTE — PROGRESS NOTES
Chief Complaint   Patient presents with    Thyroid Problem     Pcp and pharmacy verified     History of Present Illness: Hernan Allen is a 48 y.o. female here for follow up of multinodular goiter, hypercalcemia and hyperparathyroidism.  In 2019 pt moved from [place] to [place] and went to Dr. Latoya Sanabria to establish care. She had an elevated calcium at 10.5 and as part of the work-up she was sent for an US, which did not show any apparent parathyroid nodules, it did show multiple thyroid nodules. Since that time pt has had multiple Ca levels drawn and they have mostly run in the 9.5-10.7 range. In September 2021 her Ca did spike to 11.4. Her PTH has been in the 67-85 range. Her PTHrP was negative, her TSH has been normal and she has had Vitamin D in the 24-30s range.    She had repeat thyroid US in October 2021 as well as a sestimibi scan. The Thyroid US did not show any changes in thyroid nodules, but the sestimibi scan did show evidence of increased activity in the lower right thyroid pole consistent with parathyroid activity.    She saw Dr. Lorenz in December 2021 and they discussed surgery and pt stated that she wanted to wait and follow up.  Pt notes that she sees Dr. Ender Lechuga of nephrology. 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 He is concerned that my renal function is declining and that my calcium was high.    At our initial visit in February 2022 her Ca was 10.3 with PTH of 83, her Vitamin D was 30.5, her 1,25-OH Vitamin D was 65.4. Her 24 hour urine calcium was 20.  Her DXA scan showed osteopenia, but not Osteoporosis.  Her eGFR was 73.    Pt denies any recent illnesses, injuries or hospitalizations.    Last week her PTH was 53, her Ca was 10.5 and Vitamin D was 36.6.    She last saw Dr. Ender Lechuga in December 2023. \"He did not seem to have any concerns.\"    She denies issues of renal stones, hematuria, or dysuria.  \"I dealt with a lot of

## 2024-03-06 ENCOUNTER — PATIENT MESSAGE (OUTPATIENT)
Age: 49
End: 2024-03-06

## 2024-03-06 DIAGNOSIS — R73.03 PRE-DIABETES: Primary | ICD-10-CM

## 2024-03-06 DIAGNOSIS — I10 ESSENTIAL (PRIMARY) HYPERTENSION: ICD-10-CM

## 2024-03-07 RX ORDER — LISINOPRIL 10 MG/1
10 TABLET ORAL DAILY
Qty: 90 TABLET | Refills: 2 | Status: SHIPPED | OUTPATIENT
Start: 2024-03-07

## 2024-03-08 NOTE — TELEPHONE ENCOUNTER
From: Hernan Allen  To: Dr. Nyla Sanabria  Sent: 3/6/2024 6:59 PM EST  Subject: Blood Sugar Test    Hi Dr Latoya Sanabria,     I wanted to reach out and see if I can request a blood sugar test soon. I am not trying to go down a Dr. harper rabbit hole, but I feel the need to check for ANY signs of pre diabetes.   I have way too many external issues to be dealing with internal ones as well.    I look forward to your feedback.    Best,   Hernan Allen  [birth year]  [phone]

## 2024-03-15 ENCOUNTER — NURSE ONLY (OUTPATIENT)
Age: 49
End: 2024-03-15

## 2024-03-15 DIAGNOSIS — R73.03 PRE-DIABETES: ICD-10-CM

## 2024-03-15 LAB
EST. AVERAGE GLUCOSE BLD GHB EST-MCNC: 114 MG/DL
HBA1C MFR BLD: 5.6 % (ref 4–5.6)

## 2024-03-22 ENCOUNTER — HOSPITAL ENCOUNTER (OUTPATIENT)
Facility: HOSPITAL | Age: 49
End: 2024-03-22
Attending: INTERNAL MEDICINE
Payer: COMMERCIAL

## 2024-03-22 DIAGNOSIS — E55.9 VITAMIN D DEFICIENCY, UNSPECIFIED: ICD-10-CM

## 2024-03-22 DIAGNOSIS — E21.3 HYPERPARATHYROIDISM, UNSPECIFIED (HCC): ICD-10-CM

## 2024-03-22 PROCEDURE — 77080 DXA BONE DENSITY AXIAL: CPT

## 2024-08-19 RX ORDER — IBUPROFEN 600 MG/1
TABLET, FILM COATED ORAL
Qty: 30 TABLET | Refills: 0 | OUTPATIENT
Start: 2024-08-19

## 2024-09-03 DIAGNOSIS — E04.2 NONTOXIC MULTINODULAR GOITER: Primary | ICD-10-CM

## 2024-09-03 DIAGNOSIS — E21.3 HYPERPARATHYROIDISM, UNSPECIFIED (HCC): ICD-10-CM

## 2024-09-03 DIAGNOSIS — E55.9 VITAMIN D DEFICIENCY, UNSPECIFIED: ICD-10-CM

## 2024-09-28 SDOH — ECONOMIC STABILITY: INCOME INSECURITY: HOW HARD IS IT FOR YOU TO PAY FOR THE VERY BASICS LIKE FOOD, HOUSING, MEDICAL CARE, AND HEATING?: NOT VERY HARD

## 2024-09-28 SDOH — ECONOMIC STABILITY: FOOD INSECURITY: WITHIN THE PAST 12 MONTHS, YOU WORRIED THAT YOUR FOOD WOULD RUN OUT BEFORE YOU GOT MONEY TO BUY MORE.: NEVER TRUE

## 2024-09-28 SDOH — ECONOMIC STABILITY: FOOD INSECURITY: WITHIN THE PAST 12 MONTHS, THE FOOD YOU BOUGHT JUST DIDN'T LAST AND YOU DIDN'T HAVE MONEY TO GET MORE.: NEVER TRUE

## 2024-10-01 ENCOUNTER — TELEMEDICINE (OUTPATIENT)
Age: 49
End: 2024-10-01
Payer: COMMERCIAL

## 2024-10-01 DIAGNOSIS — R09.81 NASAL CONGESTION: ICD-10-CM

## 2024-10-01 DIAGNOSIS — Q66.41: ICD-10-CM

## 2024-10-01 DIAGNOSIS — E21.3 HYPERPARATHYROIDISM (HCC): ICD-10-CM

## 2024-10-01 DIAGNOSIS — R73.03 PRE-DIABETES: Primary | ICD-10-CM

## 2024-10-01 DIAGNOSIS — I10 ESSENTIAL (PRIMARY) HYPERTENSION: ICD-10-CM

## 2024-10-01 PROCEDURE — 99214 OFFICE O/P EST MOD 30 MIN: CPT | Performed by: INTERNAL MEDICINE

## 2024-10-01 RX ORDER — FLUTICASONE PROPIONATE 50 MCG
2 SPRAY, SUSPENSION (ML) NASAL DAILY
Qty: 16 G | Refills: 1 | Status: SHIPPED | OUTPATIENT
Start: 2024-10-01

## 2024-10-01 RX ORDER — IBUPROFEN 600 MG/1
600 TABLET, FILM COATED ORAL 3 TIMES DAILY PRN
Qty: 30 TABLET | Refills: 0 | Status: SHIPPED | OUTPATIENT
Start: 2024-10-01

## 2024-10-01 ASSESSMENT — PATIENT HEALTH QUESTIONNAIRE - PHQ9
SUM OF ALL RESPONSES TO PHQ QUESTIONS 1-9: 0
1. LITTLE INTEREST OR PLEASURE IN DOING THINGS: NOT AT ALL
SUM OF ALL RESPONSES TO PHQ9 QUESTIONS 1 & 2: 0
2. FEELING DOWN, DEPRESSED OR HOPELESS: NOT AT ALL

## 2024-10-01 NOTE — PROGRESS NOTES
\"Have you been to the ER, urgent care clinic since your last visit?  Hospitalized since your last visit?\"    NO    “Have you seen or consulted any other health care providers outside our system since your last visit?”    NO           
doing these    multinodular goiter,     hypercalcemia and hyperparathyroidism seeing Dr Childs and monitoring not on treatment  She had a normal bone mass density   The Thyroid US did not show any changes in thyroid nodules, but the sestimibi scan did show evidence of increased activity in the lower right thyroid pole consistent with parathyroid activity.     She saw Dr. Lorenz in December 2021 and they discussed surgery and pt stated that she wanted to wait and follow up.     Has seen kidney specialist in Feb and kidneys are stable.     HTN: she is on lisinopril 10mg and reports controlled      Review of Systems  10 systems reviewed and negative other then hPI     Objective   Patient-Reported Vitals  Patient-Reported Systolic (Top): 130 mmHg  Patient-Reported Diastolic (Bottom): 80 mmHg       Physical Exam  [INSTRUCTIONS:  \"[x]\" Indicates a positive item  \"[]\" Indicates a negative item  -- DELETE ALL ITEMS NOT EXAMINED]    Constitutional: [x] Appears well-developed and well-nourished [x] No apparent distress      [] Abnormal -     Mental status: [x] Alert and awake  [x] Oriented to person/place/time [x] Able to follow commands    [] Abnormal -     Eyes:   EOM    [x]  Normal    [] Abnormal -   Sclera  [x]  Normal    [] Abnormal -          Discharge [x]  None visible   [] Abnormal -     HENT: [x] Normocephalic, atraumatic  [] Abnormal -   [x] Mouth/Throat: Mucous membranes are moist    External Ears [x] Normal  [] Abnormal -    Neck: [x] No visualized mass [] Abnormal -     Pulmonary/Chest: [x] Respiratory effort normal   [x] No visualized signs of difficulty breathing or respiratory distress        [] Abnormal -      Musculoskeletal:   [x] Normal gait with no signs of ataxia         [x] Normal range of motion of neck        [] Abnormal -     Neurological:        [x] No Facial Asymmetry (Cranial nerve 7 motor function) (limited exam due to video visit)          [x] No gaze palsy        [] Abnormal -

## 2024-10-18 ENCOUNTER — LAB (OUTPATIENT)
Age: 49
End: 2024-10-18

## 2024-10-18 DIAGNOSIS — R73.03 PRE-DIABETES: ICD-10-CM

## 2024-10-18 LAB
EST. AVERAGE GLUCOSE BLD GHB EST-MCNC: 123 MG/DL
HBA1C MFR BLD: 5.9 % (ref 4–5.6)

## 2024-10-19 NOTE — TELEPHONE ENCOUNTER
[facility]  [facility]               Caller's first and last name and relationship (if not the patient): self   Best contact number(s): [phone]   Whose call is being returned: Erika Sher   Details to clarify the request: Pt and Erika Sher were on the phone and call dropped.  Pt would like a return call to review her lab results. None

## 2024-10-27 DIAGNOSIS — R09.81 NASAL CONGESTION: ICD-10-CM

## 2024-10-29 RX ORDER — FLUTICASONE PROPIONATE 50 MCG
2 SPRAY, SUSPENSION (ML) NASAL DAILY
Qty: 1 EACH | Refills: 1 | Status: SHIPPED | OUTPATIENT
Start: 2024-10-29

## 2024-11-21 DIAGNOSIS — R09.81 NASAL CONGESTION: ICD-10-CM

## 2024-11-21 RX ORDER — FLUTICASONE PROPIONATE 50 MCG
2 SPRAY, SUSPENSION (ML) NASAL DAILY
Qty: 1 EACH | Refills: 1 | Status: SHIPPED | OUTPATIENT
Start: 2024-11-21

## 2024-12-01 DIAGNOSIS — E21.3 HYPERPARATHYROIDISM, UNSPECIFIED (HCC): ICD-10-CM

## 2024-12-01 DIAGNOSIS — E55.9 VITAMIN D DEFICIENCY, UNSPECIFIED: ICD-10-CM

## 2024-12-02 DIAGNOSIS — R09.81 NASAL CONGESTION: ICD-10-CM

## 2024-12-03 RX ORDER — FLUTICASONE PROPIONATE 50 MCG
2 SPRAY, SUSPENSION (ML) NASAL DAILY
Qty: 1 EACH | Refills: 1 | Status: SHIPPED | OUTPATIENT
Start: 2024-12-03

## 2025-01-06 ENCOUNTER — TELEPHONE (OUTPATIENT)
Age: 50
End: 2025-01-06

## 2025-01-06 NOTE — TELEPHONE ENCOUNTER
Patient left  stating that she had to reschedule her appointment until 04/3/25. She is concerned that she cannot get in any sooner.   Advised  (name verified) that as long as she is feeling fine. OK to wait until April. Advised  will mail lab orders to have drawn a week prior to her appointment. Advised  that she is welcome to call periodically to see if there are any cancellations.

## 2025-02-07 DIAGNOSIS — I10 ESSENTIAL (PRIMARY) HYPERTENSION: ICD-10-CM

## 2025-02-10 RX ORDER — LISINOPRIL 10 MG/1
10 TABLET ORAL DAILY
Qty: 90 TABLET | Refills: 2 | Status: SHIPPED | OUTPATIENT
Start: 2025-02-10

## 2025-05-27 RX ORDER — LIDOCAINE 50 MG/G
1 PATCH TOPICAL DAILY
Qty: 30 PATCH | Refills: 0 | Status: SHIPPED | OUTPATIENT
Start: 2025-05-27 | End: 2025-06-26

## 2025-05-27 NOTE — TELEPHONE ENCOUNTER
PCP: Nyla Zayas MD    Last appt:   10/1/2024    Future Appointments   Date Time Provider Department Center   6/24/2025  2:10 PM Hiram Childs MD RDE [facility]       Requested Prescriptions     Pending Prescriptions Disp Refills    lidocaine (LIDODERM) 5 % 30 patch 0     Sig: Place 1 patch onto the skin daily 12 hours on, 12 hours off.

## 2025-06-16 DIAGNOSIS — E21.3 HYPERPARATHYROIDISM, UNSPECIFIED: ICD-10-CM

## 2025-06-16 DIAGNOSIS — E21.3 HYPERPARATHYROIDISM, UNSPECIFIED: Primary | ICD-10-CM

## 2025-06-16 DIAGNOSIS — E55.9 VITAMIN D DEFICIENCY, UNSPECIFIED: ICD-10-CM

## 2025-06-20 ENCOUNTER — TELEPHONE (OUTPATIENT)
Age: 50
End: 2025-06-20

## 2025-06-24 ENCOUNTER — OFFICE VISIT (OUTPATIENT)
Age: 50
End: 2025-06-24
Payer: COMMERCIAL

## 2025-06-24 VITALS
SYSTOLIC BLOOD PRESSURE: 114 MMHG | BODY MASS INDEX: 24.73 KG/M2 | DIASTOLIC BLOOD PRESSURE: 74 MMHG | WEIGHT: 134.4 LBS | HEART RATE: 69 BPM | HEIGHT: 62 IN

## 2025-06-24 DIAGNOSIS — E04.2 NONTOXIC MULTINODULAR GOITER: Primary | ICD-10-CM

## 2025-06-24 DIAGNOSIS — E21.3 HYPERPARATHYROIDISM, UNSPECIFIED: ICD-10-CM

## 2025-06-24 DIAGNOSIS — E55.9 VITAMIN D DEFICIENCY, UNSPECIFIED: ICD-10-CM

## 2025-06-24 LAB
25(OH)D3+25(OH)D2 SERPL-MCNC: 37.1 NG/ML (ref 30–100)
ALBUMIN SERPL-MCNC: 4.3 G/DL (ref 3.9–4.9)
BUN SERPL-MCNC: 21 MG/DL (ref 6–24)
BUN/CREAT SERPL: 22 (ref 9–23)
CALCIUM SERPL-MCNC: 10.7 MG/DL (ref 8.7–10.2)
CHLORIDE SERPL-SCNC: 103 MMOL/L (ref 96–106)
CO2 SERPL-SCNC: 22 MMOL/L (ref 20–29)
CREAT SERPL-MCNC: 0.97 MG/DL (ref 0.57–1)
EGFRCR SERPLBLD CKD-EPI 2021: 72 ML/MIN/1.73
GLUCOSE SERPL-MCNC: 121 MG/DL (ref 70–99)
PHOSPHATE SERPL-MCNC: 3.1 MG/DL (ref 3–4.3)
POTASSIUM SERPL-SCNC: 4.7 MMOL/L (ref 3.5–5.2)
SODIUM SERPL-SCNC: 141 MMOL/L (ref 134–144)

## 2025-06-24 PROCEDURE — 3078F DIAST BP <80 MM HG: CPT | Performed by: INTERNAL MEDICINE

## 2025-06-24 PROCEDURE — 3074F SYST BP LT 130 MM HG: CPT | Performed by: INTERNAL MEDICINE

## 2025-06-24 PROCEDURE — 99214 OFFICE O/P EST MOD 30 MIN: CPT | Performed by: INTERNAL MEDICINE

## 2025-06-24 RX ORDER — PREGABALIN 75 MG/1
75 CAPSULE ORAL 2 TIMES DAILY
COMMUNITY
Start: 2025-05-22

## 2025-06-24 NOTE — PROGRESS NOTES
Chief Complaint   Patient presents with    Thyroid Problem     Pcp and pharmacy verified    Parathyroid problem    Vitamin D deficiency     History of Present Illness: Hernan Allen is a 49 y.o. female here for follow up of multinodular goiter, hypercalcemia and hyperparathyroidism.  In 2019 pt moved from [place] to [place] and went to Dr. Latoya Sanabria to establish care. She had an elevated calcium at 10.5 and as part of the work-up she was sent for an US, which did not show any apparent parathyroid nodules, it did show multiple thyroid nodules. Since that time pt has had multiple Ca levels drawn and they have mostly run in the 9.5-10.7 range. In September 2021 her Ca did spike to 11.4. Her PTH has been in the 67-85 range. Her PTHrP was negative, her TSH has been normal and she has had Vitamin D in the 24-30s range.    She had repeat thyroid US in October 2021 as well as a sestimibi scan. The Thyroid US did not show any changes in thyroid nodules, but the sestimibi scan did show evidence of increased activity in the lower right thyroid pole consistent with parathyroid activity.    She saw Dr. Lorenz in December 2021 and they discussed surgery and pt stated that she wanted to wait and follow up.  Pt notes that she sees Dr. Ender Lechuga of nephrology. 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 He is concerned that my renal function is declining and that my calcium was high.    At our initial visit in February 2022 her Ca was 10.3 with PTH of 83, her Vitamin D was 30.5, her 1,25-OH Vitamin D was 65.4. Her 24 hour urine calcium was 20.  Her DXA scan showed osteopenia, but not Osteoporosis.  Her eGFR was 73.    At our last visit in January 2024 her  PTH was 53, her Ca was 10.5 and Vitamin D was 36.6.    Pt has not been back to see me since January 2024.    Pt denies any recent illnesses, injuries or hospitalizations.    She denies issues of renal stones, hematuria, or

## 2025-06-25 LAB — PTH-INTACT SERPL-MCNC: 62 PG/ML (ref 15–65)

## 2025-07-05 ENCOUNTER — HOSPITAL ENCOUNTER (OUTPATIENT)
Facility: HOSPITAL | Age: 50
End: 2025-07-05
Payer: COMMERCIAL

## 2025-07-05 DIAGNOSIS — E04.2 NONTOXIC MULTINODULAR GOITER: ICD-10-CM

## 2025-07-05 PROCEDURE — 76536 US EXAM OF HEAD AND NECK: CPT

## (undated) DEVICE — 4.5 MM ORBIT FULL RADIUS CURVED                                    BLADES, POWER/EP-1, YELLOW, CURVED                                    LENGTH 17 MM, PACKAGED 6 PER BOX, STERILE

## (undated) DEVICE — 4-PORT MANIFOLD: Brand: NEPTUNE 2

## (undated) DEVICE — TAPE ADH W2INXL5.5YD FOAM E CNFRM MULTDIR STRTCH H2O RESIST

## (undated) DEVICE — STOPCOCK IV SPINLOCK DISCOFIX -- D-500

## (undated) DEVICE — SUT ETHLN 3-0 18IN PS2 BLK --

## (undated) DEVICE — ZIMMER® STERILE DISPOSABLE TOURNIQUET CUFF WITH PLC, DUAL PORT, SINGLE BLADDER, 34 IN. (86 CM)

## (undated) DEVICE — PADDING CST 4INX4YD --

## (undated) DEVICE — PREP SKN CHLRAPRP APL 26ML STR --

## (undated) DEVICE — KNEE ARTHROSCOPY IV-LF: Brand: MEDLINE INDUSTRIES, INC.

## (undated) DEVICE — 3M™ TEGADERM™ TRANSPARENT FILM DRESSING FRAME STYLE, 1624W, 2-3/8 IN X 2-3/4 IN (6 CM X 7 CM), 100/CT 4CT/CASE: Brand: 3M™ TEGADERM™

## (undated) DEVICE — KENDALL DL ECG CABLE AND LEAD WIRE SYSTEM, 3-LEAD, SINGLE PATIENT USE: Brand: KENDALL

## (undated) DEVICE — PAD,ABDOMINAL,5"X9",ST,LF,25/BX: Brand: MEDLINE INDUSTRIES, INC.

## (undated) DEVICE — TUBING HYDR IRR --

## (undated) DEVICE — 4.5 MM FULL RADIUS STRAIGHT                                    BLADES, POWER/EP-1, YELLOW, PACKAGED                                    6 PER BOX, STERILE: Brand: DYONICS

## (undated) DEVICE — SOLUTION IRRIG 3000ML LAC RINGERS ARTHROMTC PLAS CONT

## (undated) DEVICE — DYONICS 25 INFLOW TUBE SET, 3 PER BOX

## (undated) DEVICE — STERILE POLYISOPRENE POWDER-FREE SURGICAL GLOVES: Brand: PROTEXIS

## (undated) DEVICE — NEEDLE HYPO 21GA L1.5IN INTRAMUSCULAR S STL LATCH BVL UP